# Patient Record
Sex: FEMALE | Race: ASIAN | NOT HISPANIC OR LATINO | Employment: FULL TIME | URBAN - METROPOLITAN AREA
[De-identification: names, ages, dates, MRNs, and addresses within clinical notes are randomized per-mention and may not be internally consistent; named-entity substitution may affect disease eponyms.]

---

## 2019-11-08 ENCOUNTER — OFFICE VISIT - HEALTHEAST (OUTPATIENT)
Dept: FAMILY MEDICINE | Facility: CLINIC | Age: 34
End: 2019-11-08

## 2019-11-08 DIAGNOSIS — M54.50 MIDLINE LOW BACK PAIN WITHOUT SCIATICA, UNSPECIFIED CHRONICITY: ICD-10-CM

## 2019-11-08 DIAGNOSIS — F17.200 NICOTINE DEPENDENCE, UNCOMPLICATED, UNSPECIFIED NICOTINE PRODUCT TYPE: ICD-10-CM

## 2019-11-08 DIAGNOSIS — K21.9 GASTROESOPHAGEAL REFLUX DISEASE WITHOUT ESOPHAGITIS: ICD-10-CM

## 2019-11-08 DIAGNOSIS — J98.01 BRONCHOSPASM: ICD-10-CM

## 2019-11-08 DIAGNOSIS — E66.813 CLASS 3 SEVERE OBESITY DUE TO EXCESS CALORIES WITHOUT SERIOUS COMORBIDITY IN ADULT, UNSPECIFIED BMI (H): ICD-10-CM

## 2019-11-08 DIAGNOSIS — B19.10 HEPATITIS B INFECTION WITHOUT DELTA AGENT WITHOUT HEPATIC COMA, UNSPECIFIED CHRONICITY: ICD-10-CM

## 2019-11-08 DIAGNOSIS — E66.01 CLASS 3 SEVERE OBESITY DUE TO EXCESS CALORIES WITHOUT SERIOUS COMORBIDITY IN ADULT, UNSPECIFIED BMI (H): ICD-10-CM

## 2019-11-08 DIAGNOSIS — T78.40XA ALLERGIC STATE, INITIAL ENCOUNTER: ICD-10-CM

## 2019-11-08 DIAGNOSIS — F43.10 PTSD (POST-TRAUMATIC STRESS DISORDER): ICD-10-CM

## 2019-11-08 ASSESSMENT — MIFFLIN-ST. JEOR: SCORE: 1864.56

## 2020-09-18 ENCOUNTER — COMMUNICATION - HEALTHEAST (OUTPATIENT)
Dept: FAMILY MEDICINE | Facility: CLINIC | Age: 35
End: 2020-09-18

## 2020-09-18 DIAGNOSIS — T78.40XA ALLERGIC STATE, INITIAL ENCOUNTER: ICD-10-CM

## 2020-10-30 ENCOUNTER — COMMUNICATION - HEALTHEAST (OUTPATIENT)
Dept: FAMILY MEDICINE | Facility: CLINIC | Age: 35
End: 2020-10-30

## 2020-10-30 DIAGNOSIS — T78.40XA ALLERGY, INITIAL ENCOUNTER: ICD-10-CM

## 2020-12-20 ENCOUNTER — COMMUNICATION - HEALTHEAST (OUTPATIENT)
Dept: FAMILY MEDICINE | Facility: CLINIC | Age: 35
End: 2020-12-20

## 2020-12-20 DIAGNOSIS — K21.9 GASTROESOPHAGEAL REFLUX DISEASE WITHOUT ESOPHAGITIS: ICD-10-CM

## 2020-12-20 DIAGNOSIS — M54.50 MIDLINE LOW BACK PAIN WITHOUT SCIATICA, UNSPECIFIED CHRONICITY: ICD-10-CM

## 2020-12-20 DIAGNOSIS — T78.40XA ALLERGY, INITIAL ENCOUNTER: ICD-10-CM

## 2021-02-28 ENCOUNTER — COMMUNICATION - HEALTHEAST (OUTPATIENT)
Dept: FAMILY MEDICINE | Facility: CLINIC | Age: 36
End: 2021-02-28

## 2021-02-28 DIAGNOSIS — T78.40XA ALLERGY, INITIAL ENCOUNTER: ICD-10-CM

## 2021-02-28 DIAGNOSIS — M54.50 MIDLINE LOW BACK PAIN WITHOUT SCIATICA, UNSPECIFIED CHRONICITY: ICD-10-CM

## 2021-03-09 ENCOUNTER — COMMUNICATION - HEALTHEAST (OUTPATIENT)
Dept: FAMILY MEDICINE | Facility: CLINIC | Age: 36
End: 2021-03-09

## 2021-03-18 ENCOUNTER — COMMUNICATION - HEALTHEAST (OUTPATIENT)
Dept: FAMILY MEDICINE | Facility: CLINIC | Age: 36
End: 2021-03-18

## 2021-03-27 ENCOUNTER — COMMUNICATION - HEALTHEAST (OUTPATIENT)
Dept: FAMILY MEDICINE | Facility: CLINIC | Age: 36
End: 2021-03-27

## 2021-03-27 DIAGNOSIS — K21.9 GASTROESOPHAGEAL REFLUX DISEASE WITHOUT ESOPHAGITIS: ICD-10-CM

## 2021-03-27 DIAGNOSIS — T78.40XA ALLERGY, INITIAL ENCOUNTER: ICD-10-CM

## 2021-04-30 ENCOUNTER — COMMUNICATION - HEALTHEAST (OUTPATIENT)
Dept: TELEHEALTH | Facility: CLINIC | Age: 36
End: 2021-04-30

## 2021-04-30 ENCOUNTER — OFFICE VISIT - HEALTHEAST (OUTPATIENT)
Dept: FAMILY MEDICINE | Facility: CLINIC | Age: 36
End: 2021-04-30

## 2021-04-30 DIAGNOSIS — F12.20 MARIJUANA DEPENDENCE (H): ICD-10-CM

## 2021-04-30 DIAGNOSIS — R23.3 EASY BRUISING: ICD-10-CM

## 2021-04-30 DIAGNOSIS — N92.4 EXCESSIVE BLEEDING IN PREMENOPAUSAL PERIOD: ICD-10-CM

## 2021-04-30 DIAGNOSIS — N91.4 SECONDARY OLIGOMENORRHEA: ICD-10-CM

## 2021-04-30 DIAGNOSIS — N18.1 CKD (CHRONIC KIDNEY DISEASE) STAGE 1, GFR 90 ML/MIN OR GREATER: ICD-10-CM

## 2021-04-30 DIAGNOSIS — E11.9 TYPE 2 DIABETES MELLITUS WITHOUT COMPLICATION, WITHOUT LONG-TERM CURRENT USE OF INSULIN (H): ICD-10-CM

## 2021-04-30 DIAGNOSIS — Z31.69 INFERTILITY COUNSELING: ICD-10-CM

## 2021-04-30 DIAGNOSIS — F40.01 PANIC DISORDER WITH AGORAPHOBIA: ICD-10-CM

## 2021-04-30 DIAGNOSIS — B18.1 CHRONIC VIRAL HEPATITIS B WITHOUT DELTA AGENT AND WITHOUT COMA (H): ICD-10-CM

## 2021-04-30 DIAGNOSIS — F43.10 PTSD (POST-TRAUMATIC STRESS DISORDER): ICD-10-CM

## 2021-04-30 DIAGNOSIS — B18.1 CHRONIC HEPATITIS B (H): ICD-10-CM

## 2021-04-30 DIAGNOSIS — E66.01 MORBIDLY OBESE (H): ICD-10-CM

## 2021-04-30 DIAGNOSIS — F33.2 SEVERE EPISODE OF RECURRENT MAJOR DEPRESSIVE DISORDER, WITHOUT PSYCHOTIC FEATURES (H): ICD-10-CM

## 2021-04-30 DIAGNOSIS — Z12.4 CERVICAL CANCER SCREENING: ICD-10-CM

## 2021-04-30 DIAGNOSIS — K21.9 GASTROESOPHAGEAL REFLUX DISEASE WITHOUT ESOPHAGITIS: ICD-10-CM

## 2021-04-30 DIAGNOSIS — N85.01 ENDOMETRIAL HYPERPLASIA, COMPLEX: ICD-10-CM

## 2021-04-30 DIAGNOSIS — T78.40XA ALLERGY, INITIAL ENCOUNTER: ICD-10-CM

## 2021-04-30 DIAGNOSIS — Z00.00 ROUTINE GENERAL MEDICAL EXAMINATION AT A HEALTH CARE FACILITY: ICD-10-CM

## 2021-04-30 DIAGNOSIS — D50.0 IRON DEFICIENCY ANEMIA DUE TO CHRONIC BLOOD LOSS: ICD-10-CM

## 2021-04-30 DIAGNOSIS — K21.9 GASTROESOPHAGEAL REFLUX DISEASE, UNSPECIFIED WHETHER ESOPHAGITIS PRESENT: ICD-10-CM

## 2021-04-30 LAB
AFP SERPL-MCNC: <2 UG/ML
ALBUMIN SERPL-MCNC: 3.8 G/DL (ref 3.5–5)
ALBUMIN SERPL-MCNC: 3.8 G/DL (ref 3.5–5)
ALP SERPL-CCNC: 82 U/L (ref 45–120)
ALP SERPL-CCNC: 82 U/L (ref 45–120)
ALT SERPL W P-5'-P-CCNC: 27 U/L (ref 0–45)
ALT SERPL W P-5'-P-CCNC: 27 U/L (ref 0–45)
ANION GAP SERPL CALCULATED.3IONS-SCNC: 12 MMOL/L (ref 5–18)
AST SERPL W P-5'-P-CCNC: 16 U/L (ref 0–40)
AST SERPL W P-5'-P-CCNC: 16 U/L (ref 0–40)
BILIRUB DIRECT SERPL-MCNC: 0.1 MG/DL
BILIRUB SERPL-MCNC: 0.5 MG/DL (ref 0–1)
BILIRUB SERPL-MCNC: 0.5 MG/DL (ref 0–1)
BUN SERPL-MCNC: 17 MG/DL (ref 8–22)
CALCIUM SERPL-MCNC: 9.1 MG/DL (ref 8.5–10.5)
CHLORIDE BLD-SCNC: 101 MMOL/L (ref 98–107)
CHOLEST SERPL-MCNC: 191 MG/DL
CO2 SERPL-SCNC: 24 MMOL/L (ref 22–31)
CREAT SERPL-MCNC: 0.88 MG/DL (ref 0.6–1.1)
ERYTHROCYTE [DISTWIDTH] IN BLOOD BY AUTOMATED COUNT: 16 % (ref 11–14.5)
FASTING STATUS PATIENT QL REPORTED: NO
GFR SERPL CREATININE-BSD FRML MDRD: >60 ML/MIN/1.73M2
GLUCOSE BLD-MCNC: 302 MG/DL (ref 70–125)
HBA1C MFR BLD: 8.2 %
HCT VFR BLD AUTO: 34.6 % (ref 35–47)
HDLC SERPL-MCNC: 41 MG/DL
HGB BLD-MCNC: 10.4 G/DL (ref 12–16)
HIV 1+2 AB+HIV1 P24 AG SERPL QL IA: NEGATIVE
IRON SATN MFR SERPL: 4 % (ref 20–50)
IRON SERPL-MCNC: 17 UG/DL (ref 42–175)
LDLC SERPL CALC-MCNC: 106 MG/DL
MCH RBC QN AUTO: 21 PG (ref 27–34)
MCHC RBC AUTO-ENTMCNC: 30.1 G/DL (ref 32–36)
MCV RBC AUTO: 70 FL (ref 80–100)
PLATELET # BLD AUTO: 317 THOU/UL (ref 140–440)
PMV BLD AUTO: 10.3 FL (ref 7–10)
POTASSIUM BLD-SCNC: 3.8 MMOL/L (ref 3.5–5)
PROT SERPL-MCNC: 7.4 G/DL (ref 6–8)
PROT SERPL-MCNC: 7.4 G/DL (ref 6–8)
RBC # BLD AUTO: 4.95 MILL/UL (ref 3.8–5.4)
SODIUM SERPL-SCNC: 137 MMOL/L (ref 136–145)
TIBC SERPL-MCNC: 389 UG/DL (ref 313–563)
TRANSFERRIN SERPL-MCNC: 311 MG/DL (ref 212–360)
TRIGL SERPL-MCNC: 220 MG/DL
TSH SERPL DL<=0.005 MIU/L-ACNC: 1.17 UIU/ML (ref 0.3–5)
WBC: 11 THOU/UL (ref 4–11)

## 2021-04-30 ASSESSMENT — PATIENT HEALTH QUESTIONNAIRE - PHQ9: SUM OF ALL RESPONSES TO PHQ QUESTIONS 1-9: 9

## 2021-04-30 ASSESSMENT — MIFFLIN-ST. JEOR: SCORE: 1874.77

## 2021-05-01 LAB — HBV E AG SERPL QL IA: NEGATIVE

## 2021-05-03 LAB
25(OH)D3 SERPL-MCNC: 15.5 NG/ML (ref 30–80)
HCV AB SERPL QL IA: NEGATIVE

## 2021-05-04 ENCOUNTER — COMMUNICATION - HEALTHEAST (OUTPATIENT)
Dept: FAMILY MEDICINE | Facility: CLINIC | Age: 36
End: 2021-05-04

## 2021-05-04 ENCOUNTER — AMBULATORY - HEALTHEAST (OUTPATIENT)
Dept: FAMILY MEDICINE | Facility: CLINIC | Age: 36
End: 2021-05-04

## 2021-05-04 DIAGNOSIS — E55.9 VITAMIN D DEFICIENCY: ICD-10-CM

## 2021-05-04 LAB
HBV DNA SERPL NAA+PROBE-ACNC: NORMAL [IU]/ML
HBV DNA SERPL NAA+PROBE-LOG IU: NORMAL {LOG_IU}/ML

## 2021-05-05 ENCOUNTER — RECORDS - HEALTHEAST (OUTPATIENT)
Dept: FAMILY MEDICINE | Facility: CLINIC | Age: 36
End: 2021-05-05

## 2021-05-05 ENCOUNTER — COMMUNICATION - HEALTHEAST (OUTPATIENT)
Dept: FAMILY MEDICINE | Facility: CLINIC | Age: 36
End: 2021-05-05

## 2021-05-06 ENCOUNTER — COMMUNICATION - HEALTHEAST (OUTPATIENT)
Dept: FAMILY MEDICINE | Facility: CLINIC | Age: 36
End: 2021-05-06

## 2021-05-06 DIAGNOSIS — E11.9 TYPE 2 DIABETES MELLITUS WITHOUT COMPLICATION, WITHOUT LONG-TERM CURRENT USE OF INSULIN (H): ICD-10-CM

## 2021-05-10 ENCOUNTER — COMMUNICATION - HEALTHEAST (OUTPATIENT)
Dept: FAMILY MEDICINE | Facility: CLINIC | Age: 36
End: 2021-05-10

## 2021-05-20 ENCOUNTER — COMMUNICATION - HEALTHEAST (OUTPATIENT)
Dept: FAMILY MEDICINE | Facility: CLINIC | Age: 36
End: 2021-05-20

## 2021-05-20 ENCOUNTER — RECORDS - HEALTHEAST (OUTPATIENT)
Dept: FAMILY MEDICINE | Facility: CLINIC | Age: 36
End: 2021-05-20

## 2021-05-26 ENCOUNTER — COMMUNICATION - HEALTHEAST (OUTPATIENT)
Dept: FAMILY MEDICINE | Facility: CLINIC | Age: 36
End: 2021-05-26

## 2021-05-26 DIAGNOSIS — T78.40XA ALLERGY, INITIAL ENCOUNTER: ICD-10-CM

## 2021-05-27 ENCOUNTER — COMMUNICATION - HEALTHEAST (OUTPATIENT)
Dept: FAMILY MEDICINE | Facility: CLINIC | Age: 36
End: 2021-05-27

## 2021-05-27 ENCOUNTER — OFFICE VISIT - HEALTHEAST (OUTPATIENT)
Dept: FAMILY MEDICINE | Facility: CLINIC | Age: 36
End: 2021-05-27

## 2021-05-27 DIAGNOSIS — E11.9 TYPE 2 DIABETES MELLITUS WITHOUT COMPLICATION, WITHOUT LONG-TERM CURRENT USE OF INSULIN (H): ICD-10-CM

## 2021-05-27 DIAGNOSIS — E11.65 TYPE 2 DIABETES MELLITUS WITH HYPERGLYCEMIA, WITHOUT LONG-TERM CURRENT USE OF INSULIN (H): ICD-10-CM

## 2021-05-27 DIAGNOSIS — K21.9 GASTROESOPHAGEAL REFLUX DISEASE, UNSPECIFIED WHETHER ESOPHAGITIS PRESENT: ICD-10-CM

## 2021-05-27 DIAGNOSIS — T78.40XA ALLERGY, INITIAL ENCOUNTER: ICD-10-CM

## 2021-05-27 DIAGNOSIS — K59.00 CONSTIPATION, UNSPECIFIED CONSTIPATION TYPE: ICD-10-CM

## 2021-05-27 ASSESSMENT — PATIENT HEALTH QUESTIONNAIRE - PHQ9: SUM OF ALL RESPONSES TO PHQ QUESTIONS 1-9: 9

## 2021-06-03 VITALS
HEART RATE: 80 BPM | HEIGHT: 64 IN | DIASTOLIC BLOOD PRESSURE: 88 MMHG | SYSTOLIC BLOOD PRESSURE: 124 MMHG | RESPIRATION RATE: 20 BRPM | TEMPERATURE: 97.8 F | WEIGHT: 264 LBS | BODY MASS INDEX: 45.07 KG/M2

## 2021-06-03 NOTE — PROGRESS NOTES
New pt  Pt was working as [employer]  at this clinic and with facial swelling made appt to be seen  Does not have a pcp    Swelling itch left face 2 hours.  Had felt itch,  Scratched,  then swell.   Left face and left arm     Facial neck up to parietal area.    No shortness of breath  Do feel shaky    Has been onTrazodone and fluoxetine years  Dr Tana slaughter?   [facility] [place]  ptsd anx dep  Drug allergies : zoloft causes arm pain    Unusual recent events: had teeth Cleaning This morning.   Second session this week.    No faintness    Smoking for 20 years.   3 cig per day    Denies hemoptysis  No etoh    Eye allergic   Nasal   In April this year treated with antihistamines    No known past wheeze though has never been examined    hosp surg neg  Fmh: dad with hep B    She thinks she has hep b...       Dad  liver failure.  From B  Was 65.    Has no pcp    Never follow up hep b    [facility] Works as  Never been preg.  Single    ROS:  Constitutional: denies fever  Vision: denies change in vision  ENT: denies cough or congestion  Thyroid: denies unusual fatigue  Resp: denies shortness of breath  Card: denies palpitations  GI: denies vomiting or abnormal stool, melena, hematochezia      complains of frequent heartburn and otc ppi use  : denies dysuria  Neuro: denies numbness or weakness  Derm: above  Joints: denies redness or swelling    Back intermittent pain.  occ intermittent radiation to one side or other not below hips  Endo: denies polyuria  Mental health: mood is good  Extremities: no edema  Mobility: stable    Otherwise negative review of systems    Chronic hepatitis denies abdominal pain or icterus   Obesity denies polyuria  Nicotine dependence.  Denies hemoptysis.   Anxiety disorder, denies recent disabling, functionally impairing, problems.          OBJECTIVE:   Vitals:    19 1702   BP: 124/88   Pulse: 80   Resp: 20   Temp: 97.8  F (36.6  C)      Wt is noted.  No  diaphoresis  Eyes: nl eom, anicteric   External ears, nose: nl      Pt initially seen over two hours after onset of sxs.  Mild left facial swelling and minimal erythema.  No warmth.   Left neck 2.5x1.5 cm urticaria     Neck: nl nodes, supple, thyroid normal   Lungs: decreased air exchange.  Terminal minimal wheeze.   Heart: regular rhythm  Abd: soft nontender     No cva (renal) tenderness  Neuro: no weakness  Skin no rash  Joints: uninflamed   No ketotic breath odor noted  Mental: euthymic  Ext: nontender calves   Gait: normal    Body mass index is 46.03 kg/m .     ASSESSMENT/PLAN:    End of visit over 3 hours after initial sxs.  Stable.  / ED if worse    Allergic reaction. ? Etiology.  Did get teeth cleaning earlier today    Discussed need for addressing chronic issues: hepatitis B,  Obesity, nicotine, bronchospasm    R/o pylori prior to ppi prn    Chronic issues stable/ same treatment.     follow up here one month or establish pcp elsewhere.   Sooner if sxs    1. Allergic state, initial encounter  cetirizine (ZYRTEC) 10 MG tablet    diphenhydrAMINE (BENADRYL) 25 mg capsule   2. Hepatitis B infection without delta agent without hepatic coma, unspecified chronicity     3. Class 3 severe obesity due to excess calories without serious comorbidity in adult, unspecified BMI (H)     4. Nicotine dependence, uncomplicated, unspecified nicotine product type     5. PTSD (post-traumatic stress disorder)     6. Bronchospasm  albuterol (PROAIR HFA;PROVENTIL HFA;VENTOLIN HFA) 90 mcg/actuation inhaler   7. Gastroesophageal reflux disease without esophagitis  omeprazole (PRILOSEC) 20 MG capsule    H. pylori Antigen, Stool(HPSAG)   8. Midline low back pain without sciatica, unspecified chronicity  acetaminophen 500 mg coapsule

## 2021-06-05 VITALS
SYSTOLIC BLOOD PRESSURE: 132 MMHG | RESPIRATION RATE: 18 BRPM | WEIGHT: 268 LBS | DIASTOLIC BLOOD PRESSURE: 84 MMHG | HEIGHT: 63 IN | TEMPERATURE: 98.1 F | BODY MASS INDEX: 47.48 KG/M2 | HEART RATE: 105 BPM

## 2021-06-11 ENCOUNTER — COMMUNICATION - HEALTHEAST (OUTPATIENT)
Dept: FAMILY MEDICINE | Facility: CLINIC | Age: 36
End: 2021-06-11

## 2021-06-11 DIAGNOSIS — B18.1 CHRONIC HEPATITIS B (H): ICD-10-CM

## 2021-06-15 NOTE — TELEPHONE ENCOUNTER
Please let the patient know that Dr. Staley has retired.    She was given 30-day supply of medications, but needs to schedule with a new provider.  Please schedule an appointment virtual or face-to-face.

## 2021-06-15 NOTE — TELEPHONE ENCOUNTER
RN cannot approve Refill Request    RN can NOT refill this medication No established PCP. Last office visit: Visit date not found Last Physical: Visit date not found Last MTM visit: Visit date not found Last visit same specialty: 11/8/2019 Kevin Staley MD.  Next visit within 3 mo: Visit date not found  Next physical within 3 mo: Visit date not found      Lisa Torres, [facility] Triage/Med Refill 2/28/2021    Requested Prescriptions   Pending Prescriptions Disp Refills     cetirizine (ZYRTEC) 10 MG tablet [Pharmacy Med Name: Cetirizine HCl Oral Tablet 10 MG] 30 tablet 0     Sig: Take 1 tablet (10 mg total) by mouth daily.       Antihistamine Refill Protocol Failed - 2/28/2021  3:45 PM        Failed - Patient has had office visit/physical in last year     Last office visit with prescriber/PCP: Visit date not found OR same dept: Visit date not found OR same specialty: 11/8/2019 Kevin Staley MD  Last physical: Visit date not found Last MTM visit: Visit date not found   Next visit within 3 mo: Visit date not found  Next physical within 3 mo: Visit date not found  Prescriber OR PCP: Alanis Richard MD  Last diagnosis associated with med order: 1. Allergy, initial encounter  - cetirizine (ZYRTEC) 10 MG tablet [Pharmacy Med Name: Cetirizine HCl Oral Tablet 10 MG]; Take 1 tablet (10 mg total) by mouth daily.  Dispense: 30 tablet; Refill: 0    2. Midline low back pain without sciatica, unspecified chronicity  - acetaminophen (TYLENOL) 500 MG tablet [Pharmacy Med Name: Acetaminophen Oral Tablet 500 MG]; TAKE TWO TABLETS BY MOUTH TWO TIMES DAILY AS NEEDED for pain   Dispense: 200 tablet; Refill: 0    If protocol passes may refill for 12 months if within 3 months of last provider visit (or a total of 15 months).                acetaminophen (TYLENOL) 500 MG tablet [Pharmacy Med Name: Acetaminophen Oral Tablet 500 MG] 200 tablet 0     Sig: TAKE TWO TABLETS BY MOUTH TWO TIMES DAILY AS NEEDED for pain        There is no refill protocol information for this order

## 2021-06-15 NOTE — TELEPHONE ENCOUNTER
Left message #2 at [phone]. Sending letter out and postponing task out to 2 weeks and will try again if an appointment hasn't been made. If patient returns call back, please help patient schedule an appointment per message below. Thanks!

## 2021-06-15 NOTE — TELEPHONE ENCOUNTER
Left message #1 at [phone]. Postponing task out to a week and will try again. If patient returns call back, please help patient schedule an appointment per message below. Thanks!

## 2021-06-16 NOTE — TELEPHONE ENCOUNTER
RN cannot approve Refill Request    RN can NOT refill this medication PCP messaged that patient is overdue for Office Visit. Last office visit: Visit date not found Last Physical: Visit date not found Last MTM visit: Visit date not found Last visit same specialty: 11/8/2019 Kevin Staley MD.  Next visit within 3 mo: Visit date not found  Next physical within 3 mo: Visit date not found      Lisa Torres, [facility] Triage/Med Refill 3/27/2021    Requested Prescriptions   Pending Prescriptions Disp Refills     omeprazole (PRILOSEC) 20 MG capsule [Pharmacy Med Name: Omeprazole Oral Capsule Delayed Release 20 MG] 30 capsule 0     Sig: Take 1 capsule (20 mg total) by mouth daily before breakfast.       GI Medications Refill Protocol Failed - 3/27/2021  2:10 PM        Failed - PCP or prescribing provider visit in last 12 or next 3 months.     Last office visit with prescriber/PCP: Visit date not found OR same dept: Visit date not found OR same specialty: 11/8/2019 Kevin Staley MD  Last physical: Visit date not found Last MTM visit: Visit date not found   Next visit within 3 mo: Visit date not found  Next physical within 3 mo: Visit date not found  Prescriber OR PCP: Alanis Richard MD  Last diagnosis associated with med order: 1. Gastroesophageal reflux disease without esophagitis  - omeprazole (PRILOSEC) 20 MG capsule [Pharmacy Med Name: Omeprazole Oral Capsule Delayed Release 20 MG]; Take 1 capsule (20 mg total) by mouth daily before breakfast.  Dispense: 30 capsule; Refill: 0    If protocol passes may refill for 12 months if within 3 months of last provider visit (or a total of 15 months).

## 2021-06-16 NOTE — TELEPHONE ENCOUNTER
Future Appointments   Date Time Provider Department Center   4/30/2021  9:00 AM Abril Martin MD [facility]     Health Maintenance Due   Topic Date Due     DEPRESSION ACTION PLAN  Never done     HEPATITIS C SCREENING  Never done     PREVENTIVE CARE VISIT  Never done     Pneumococcal Vaccine: Pediatrics (0 to 5 Years) and At-Risk Patients (6 to 64 Years) (1 of 2 - PPSV23) Never done     HIV SCREENING  Never done     ADVANCE CARE PLANNING  Never done     PAP SMEAR  Never done     TD 18+ HE  09/17/2019     INFLUENZA VACCINE RULE BASED (1) 08/01/2020     BP Readings from Last 3 Encounters:   11/08/19 124/88

## 2021-06-16 NOTE — TELEPHONE ENCOUNTER
Who is calling:  Monica  Reason for Call:  Was wondering if Dr. Martin would take her on as a patient - she is wanting to start a family and feels comfortable with Dr. Martin  Date of last appointment with primary care: NA  Okay to leave a detailed message: Yes

## 2021-06-16 NOTE — TELEPHONE ENCOUNTER
RN cannot approve Refill Request    RN can NOT refill this medication Protocol failed and NO refill given. Last office visit: Visit date not found Last Physical: Visit date not found Last MTM visit: Visit date not found Last visit same specialty: 11/8/2019 Kevin Staley MD.  Next visit within 3 mo: Visit date not found  Next physical within 3 mo: Visit date not found      Lisa Torres, [facility] Triage/Med Refill 3/27/2021    Requested Prescriptions   Pending Prescriptions Disp Refills     BANOPHEN 25 mg capsule [Pharmacy Med Name: Banophen Oral Capsule 25 MG] 200 capsule 0     Sig: TAKE ONE CAPSULE BY MOUTH EVERY SIX HOURS AS NEEDED FOR ITCHING       Antihistamine Refill Protocol Failed - 3/27/2021  2:08 PM        Failed - Patient has had office visit/physical in last year     Last office visit with prescriber/PCP: Visit date not found OR same dept: Visit date not found OR same specialty: 11/8/2019 Kevin Staley MD  Last physical: Visit date not found Last MTM visit: Visit date not found   Next visit within 3 mo: Visit date not found  Next physical within 3 mo: Visit date not found  Prescriber OR PCP: Rosas Hammer MD  Last diagnosis associated with med order: 1. Allergy, initial encounter  - BANOPHEN 25 mg capsule [Pharmacy Med Name: Banophen Oral Capsule 25 MG]; TAKE ONE CAPSULE BY MOUTH EVERY SIX HOURS AS NEEDED FOR ITCHING  Dispense: 200 capsule; Refill: 0    If protocol passes may refill for 12 months if within 3 months of last provider visit (or a total of 15 months).

## 2021-06-17 NOTE — TELEPHONE ENCOUNTER
Maricel Ling at 5/5/2021  2:48 PM    Status: Signed      Unable to LM at [phone] due to Unable to recive calls at this time . Sending letter out and postponing task out to 2 weeks and will try again if an appointment hasn't been made.          Blanca Shay at 5/5/2021  3:06 PM    Status: Signed      Unable to LM at [phone] due to voice mail not set up . Sending letter out and postponing task out to 2 weeks and will try again if an appointment hasn't been made.

## 2021-06-17 NOTE — TELEPHONE ENCOUNTER
RN cannot approve Refill Request    RN can NOT refill this medication med is not covered by policy/route to provider. Last office visit: Visit date not found Last Physical: Visit date not found Last MTM visit: Visit date not found Last visit same specialty: 11/8/2019 Kevin Staley MD.  Next visit within 3 mo: Visit date not found  Next physical within 3 mo: Visit date not found      Adelia Pride, [facility] Triage/Med Refill 5/20/2021    Requested Prescriptions   Pending Prescriptions Disp Refills     acetaminophen (TYLENOL) 500 MG tablet [Pharmacy Med Name: Acetaminophen Oral Tablet 500 MG] 30 tablet 0     Sig: TAKE TWO TABLETS BY MOUTH TWO TIMES DAILY AS NEEDED FOR PAIN       There is no refill protocol information for this order

## 2021-06-17 NOTE — TELEPHONE ENCOUNTER
Patient called back, message was relayed. Please put in referral for Diabetes Education. No further question. Completing task.

## 2021-06-17 NOTE — TELEPHONE ENCOUNTER
Left message x 1. Please review message thread below and advise the patient as indicated. Please schedule if necessary or indicated in message thread.       ----- Message from Abril Martin MD sent at 5/4/2021  4:23 PM CDT -----  Please let Monica know:    Diabetes  - your A1c is 8.2%. It should be less than 7%.  - see diabetes educator to learn how to check your sugars, and learn to eat & exercise to take good care of your diabetes.    Vitamin D deficiency:  -start vitamin D (we will send in a prescription).    Iron Deficiency Anemia  - your red blood cells are low  -start ferrous gluconate (iron) every other day for a minimum of six months    Hepatitis B:  - no detectable virus  - no sign of liver cancer or injury  - you don't have hepatitis C or HIV    Cholesterol  - LDL is good, triglycerides are normal (for not fasting)  - your HDL is a little low. Regular exercise will help with this.    NORMAL thyroid & Kidney tests

## 2021-06-17 NOTE — PROGRESS NOTES
Unable to LM at [phone] due to Unable to recive calls at this time . Sending letter out and postponing task out to 2 weeks and will try again if an appointment hasn't been made.

## 2021-06-17 NOTE — TELEPHONE ENCOUNTER
----- Message from Abril Martin MD sent at 5/5/2021  2:13 PM CDT -----  Please schedule follow up in one month: new diabetes, abnormal labs.

## 2021-06-17 NOTE — TELEPHONE ENCOUNTER
5-5-21  Reason for Call:hep b postive    Detailed comments: jc called from [facility]pt of health stated on 4-30-21 pt tested positive for hep b and jc wants to know if pt is pregnant     Phone Number Patient can be reached at: Other phone number: [phone]    Best Time: anytime    Can we leave a detailed message on this number?: Yes    Call taken on 5/5/2021 at 2:25 PM by Clare Farley

## 2021-06-17 NOTE — PATIENT INSTRUCTIONS - HE
Possible Allergy  See allergist - bring in MSDS (materials safety data sheet) when you go.    Infertility  See fertility specialist

## 2021-06-17 NOTE — PROGRESS NOTES
Health Maintenance Exam  [facility]  Date of Service: 04/30/2021    Agnes Doshi is a 35 y.o. female who presents for a routine physical exam.     Current concerns include the following:    Skin tags   -neck, arm pits, bra line  -catching on clothing and jewelry    Fertility   -10 years of trying without pregnancy  -oligomenorrhea    Allergy?  -sensitive to smell of chemical at work - gets nauseated when others don't.   -Chemical is named: Parylene    Abnormal BM   - diarrhea, constpiation  - chronic    Active Non-Hospital Problems    Diagnosis     Diabetes mellitus, type 2 (H)     Dx: 2016       Endometrial hyperplasia, complex     BMI 45.0-49.9, adult (H)     Major depressive disorder, recurrent episode, severe (H)     Infertility counseling     Trying from 7782-7879 without pregnancy. Oligomenorrhea.       Secondary oligomenorrhea     PCOS?       Iron deficiency anemia due to chronic blood loss     Menorrhagia     5/16: Endometrial echo 20 mm, thickened.  Pt is bleeding, the measured thickness   appears to have vascular flow within, may wish to consider hysteroscopy   and/or D&C for optimal evaluation.    5/14: Endometrium, biopsy --        1.  Focal complex hyperplasia without atypia and rare squamous   morules (see comment)       2.  No malignancy seen   Comments   Some studies report an associated increased risk of concurrent or   future development of endometrial carcinoma with squamous morular   metaplasia.  Recommend followup 3-6 interval endometrial curettage and   close long term surveillance.         Panic disorder with agoraphobia     PTSD (post-traumatic stress disorder)     GERD (gastroesophageal reflux disease)     Chronic hepatitis B (H)     2008. HepBsAg positive. Hep A immunization needed____, Hep C & HIV neg.       Marijuana dependence (H)     Easy bruising     CKD (chronic kidney disease) stage 1, GFR 90 ml/min or greater     H/o low creatinine 2016        Past Medical History:   Diagnosis Date     Chlamydia 2013      Past Surgical History:   Procedure Laterality Date     NO PAST SURGERIES        Current Outpatient Medications   Medication Sig Dispense Refill     acetaminophen (TYLENOL) 500 MG tablet TAKE TWO TABLETS BY MOUTH TWO TIMES DAILY AS NEEDED for pain 30 tablet 0     albuterol (PROAIR HFA;PROVENTIL HFA;VENTOLIN HFA) 90 mcg/actuation inhaler Inhale 2 puffs every 6 (six) hours as needed for wheezing. 1 each 11     cetirizine (ZYRTEC) 10 MG tablet Take 1 tablet (10 mg total) by mouth daily. 30 tablet 0     FLUoxetine (PROZAC) 40 MG capsule Take 1 capsule (40 mg total) by mouth daily. 90 capsule 4     omeprazole (PRILOSEC) 20 MG capsule Take 1 capsule (20 mg total) by mouth daily before breakfast. 90 capsule 3     sucralfate (CARAFATE) 1 gram tablet Take 1 g by mouth 4 (four) times a day.       traZODone (DESYREL) 50 MG tablet Take 1 tablet (50 mg total) by mouth at bedtime as needed. 90 tablet 4     alcohol swabs PadM Apply 1 each topically daily. 100 each 3     BANOPHEN 25 mg capsule TAKE ONE CAPSULE BY MOUTH EVERY SIX HOURS AS NEEDED FOR ITCHING 200 capsule 0     blood glucose test strips Use 1 each As Directed daily. Dispense brand per patient's insurance at pharmacy discretion. 100 strip 3     ergocalciferol (ERGOCALCIFEROL) 1,250 mcg (50,000 unit) capsule Take 1 capsule (50,000 Units total) by mouth once a week for 12 doses. (until late July). Then decrease to once a month. 12 capsule 0     ferrous gluconate (FERGON) 324 MG tablet Take 1 tablet (324 mg total) by mouth every other day. 100 tablet 1     ketotifen (ZADITOR/ZYRTEC ITCHY EYES) 0.025 % (0.035 %) ophthalmic solution Administer 1 drop to both eyes 2 (two) times a day as needed. 10 mL 11     lancets 32 gauge Misc Use 1 each As Directed daily. 100 each 3     prenatal vit no.130-iron-folic (PRENATAL VITAMIN) 27 mg iron- 800 mcg Tab tablet Take 1 tablet by mouth once daily. 100 tablet 3     No  current facility-administered medications for this visit.       Allergies   Allergen Reactions     Sertraline      Sharp pains in arms      Social History     Socioeconomic History     Marital status: Life Partner     Spouse name: Chaz Cheung     Number of children: 0     Years of education: Not on file     Highest education level: Not on file   Occupational History     Occupation:    Social Needs     Financial resource strain: Not on file     Food insecurity     Worry: Not on file     Inability: Not on file     Transportation needs     Medical: Not on file     Non-medical: Not on file   Tobacco Use     Smoking status: Current Every Day Smoker     Packs/day: 0.25     Types: Cigarettes     Smokeless tobacco: Never Used   Substance and Sexual Activity     Alcohol use: Not on file     Drug use: Yes     Types: Marijuana     Comment: h/o positive amphetamines on screen     Sexual activity: Yes     Partners: Male     Birth control/protection: None     Comment: Planning pregnancy (experiencing infertility)   Lifestyle     Physical activity     Days per week: 0 days     Minutes per session: 0 min     Stress: Not on file   Relationships     Social connections     Talks on phone: Not on file     Gets together: Not on file     Attends Worship service: Not on file     Active member of club or organization: Not on file     Attends meetings of clubs or organizations: Not on file     Relationship status: Not on file     Intimate partner violence     Fear of current or ex partner: Not on file     Emotionally abused: Not on file     Physically abused: Not on file     Forced sexual activity: Not on file   Other Topics Concern     Not on file   Social History Narrative     Not on file      Family History   Problem Relation Age of Onset     Hepatitis Father          of liver failure due to hep B 65y      REVIEW OF SYSTEMS: negative, except as listed in subjective above.    Physical Exam   /84 (Patient Site: Right  "Arm, Patient Position: Sitting, Cuff Size: Thigh)   Pulse (!) 105   Temp 98.1  F (36.7  C) (Temporal)   Resp 18   Ht 5' 3\" (1.6 m)   Wt (!) 268 lb (121.6 kg)   LMP 04/11/2021   BMI 47.47 kg/m     Social History     Tobacco Use   Smoking Status Current Every Day Smoker     Packs/day: 0.25     Types: Cigarettes   Smokeless Tobacco Never Used     General: Alert, NAD.  Head: NC/AT.  Ears: Normal canal, pinnae and tympanic membrane.  Eyes: PERRL, normal fundi.  Nose: Normal mucosa.  Mouth: Adequate dentition, normal throat.  Neck: normal thyroid, midline trachea.  Breasts: no masses, skin changes, nipple discharge or tenderness.  Lungs: CTA bilaterally, no increased work of breathing.  Heart: RRR, no m/r/g  Abdomen: soft, nt/nd, BS+  Genitourinary: Normal vulva, normal vaginal mucosa, cervix normal appearance. No cervical or adnexal tenderness. No adnexal fullness.  Musculoskeletal: No significant deformity.  Skin: no atypical lesion or rash.  Lymphatics: no lymphadenopathy.   Psych: normal affect.    Physical on 04/30/2021   Component Date Value Ref Range Status     AFP-Tumor Marker 04/30/2021 <2.0  <=8.5 ug/mL Final     Hep B Virus DNA Quant IU/mL 04/30/2021 HBV DNA Not Detected  HBVND [IU]/mL Final    The AJIT AmpliPrep/AJIT TaqMan HBV Test is an FDA-approved in vitro nucleic  acid amplification test for the quantitation of HBV DNA in human plasma (EDTA  plasma) or serum using the AJIT AmpliPrep Instrument for automated viral  nucleic acid extraction and the AJIT TaqMan Analyzer or AJIT TaqMan for the  automated Real Time PCR amplification and detection of viral nucleic acid  target.  Titer results are reported in International Units/mL (IU/mL) using the 1st WHO  International standard for HBV for Nucleic Acid Amplification based assays.     Hep B Virus DNA Quant Log IU/mL 04/30/2021 Not Calculated  <1.3 [Log_IU]/mL Final      Performed and/or entered by:  [facility]  [address] [place] [address]     Bilirubin, Total 04/30/2021 0.5  0.0 - 1.0 mg/dL Final     Bilirubin, Direct 04/30/2021 0.1  <=0.5 mg/dL Final     Protein, Total 04/30/2021 7.4  6.0 - 8.0 g/dL Final     Albumin 04/30/2021 3.8  3.5 - 5.0 g/dL Final     Alkaline Phosphatase 04/30/2021 82  45 - 120 U/L Final     AST 04/30/2021 16  0 - 40 U/L Final     ALT 04/30/2021 27  0 - 45 U/L Final     Hepatitis Be Antigen 04/30/2021 Negative  Negative Final    Performed by [facility],  [address] [place] [id number] [phone]  [email or web address], Francy Ghosh MD, Lab. Director     Hepatitis C Ab 04/30/2021 Negative  Negative Final     HIV Antigen / Antibody 04/30/2021 Negative  Negative Final     WBC 04/30/2021 11.0  4.0 - 11.0 thou/uL Final     RBC 04/30/2021 4.95  3.80 - 5.40 mill/uL Final     Hemoglobin 04/30/2021 10.4* 12.0 - 16.0 g/dL Final     Hematocrit 04/30/2021 34.6* 35.0 - 47.0 % Final     MCV 04/30/2021 70* 80 - 100 fL Final     MCH 04/30/2021 21.0* 27.0 - 34.0 pg Final     MCHC 04/30/2021 30.1* 32.0 - 36.0 g/dL Final     RDW 04/30/2021 16.0* 11.0 - 14.5 % Final     Platelets 04/30/2021 317  140 - 440 thou/uL Final     MPV 04/30/2021 10.3* 7.0 - 10.0 fL Final     Sodium 04/30/2021 137  136 - 145 mmol/L Final     Potassium 04/30/2021 3.8  3.5 - 5.0 mmol/L Final     Chloride 04/30/2021 101  98 - 107 mmol/L Final     CO2 04/30/2021 24  22 - 31 mmol/L Final     Anion Gap, Calculation 04/30/2021 12  5 - 18 mmol/L Final     Glucose 04/30/2021 302* 70 - 125 mg/dL Final     BUN 04/30/2021 17  8 - 22 mg/dL Final     Creatinine 04/30/2021 0.88  0.60 - 1.10 mg/dL Final     GFR MDRD Af Amer 04/30/2021 >60  >60 mL/min/1.73m2 Final     GFR MDRD Non Af Amer 04/30/2021 >60  >60 mL/min/1.73m2 Final     Bilirubin, Total 04/30/2021 0.5  0.0 - 1.0 mg/dL Final     Calcium 04/30/2021 9.1  8.5 - 10.5 mg/dL Final     Protein, Total 04/30/2021 7.4  6.0 - 8.0 g/dL Final     Albumin 04/30/2021 3.8  3.5 - 5.0 g/dL Final      Alkaline Phosphatase 04/30/2021 82  45 - 120 U/L Final     AST 04/30/2021 16  0 - 40 U/L Final     ALT 04/30/2021 27  0 - 45 U/L Final     Cholesterol 04/30/2021 191  <=199 mg/dL Final     Triglycerides 04/30/2021 220* <=149 mg/dL Final     HDL Cholesterol 04/30/2021 41* >=50 mg/dL Final     LDL Calculated 04/30/2021 106  <=129 mg/dL Final     Patient Fasting > 8hrs? 04/30/2021 No   Final     TSH 04/30/2021 1.17  0.30 - 5.00 uIU/mL Final     Vitamin D, Total (25-Hydroxy) 04/30/2021 15.5* 30.0 - 80.0 ng/mL Final     Hemoglobin A1c 04/30/2021 8.2* <=5.6 % Final     Iron 04/30/2021 17* 42 - 175 ug/dL Final     Transferrin 04/30/2021 311  212 - 360 mg/dL Final     Transferrin Saturation, Calculated 04/30/2021 4* 20 - 50 % Final     Transferrin IBC, Calculated 04/30/2021 389  313 - 563 ug/dL Final         Assessment & Plan   1. Health Maintenance  o Cancer screening: pap smear due - patient will do next time.  o Bone Health: Discussed Calcium, vitamin D, and weight bearing exercise.  o Immunizations: Reviewed and due for pneumovax (due to DM2), hepatitis A (due to chronic hep B), Covid-19 immunization. and Td.  2. Reproductive plans: Planning pregnancy. Encouraged establishing care with fertility specialist. She will consider it.  3. Endometrial hyperplasia. History of abnormal US 2016 and abnormal endometrial biopsy 2014 (no malignancy, but increased risk). Now with oligomenorrhea, menorrhagia, iron deficiency anemia. Care everywhere reviewed. She needs follow up endometrial biopsy.  4. Advance directive: not on file I have had an Advance Directives discussion with the patient..  5. Type 2 diabetes - new diagnosis. A1c 8.2%, chart review shows blood glucose 203 in 2016. Referral made to diabetes educator. Testing supplies sent.   6. Allergy concern - referral made to allergist. Patient will bring MSDS with her to the visit for the chemical of concern.  7. Skin tags. Likely due to insulin resistance. She will trim those  that she is able and will schedule a removal for any that she isn't able to remove at home  8. BMI 47. Discussed weight loss options.   9. Chronic hepatitis B. Labs and US ordered.  10. Follow up in one month.    Order Summary                                                      1. Routine general medical examination at a health care facility     2. Endometrial hyperplasia, complex     3. Gastroesophageal reflux disease, unspecified whether esophagitis present     4. Chronic viral hepatitis B without delta agent and without coma (H)  AFP-Tumor Marker    Hepatitis B DNA Quantitative Real-Time PCR(HBQNT)    Hepatic Profile    Hepatitis Be Antigen    US Abdomen Limited    Hepatitis C Antibody (Anti-HCV)    HIV Antigen/Antibody Screening [place]   5. Severe episode of recurrent major depressive disorder, without psychotic features (H)  FLUoxetine (PROZAC) 40 MG capsule    traZODone (DESYREL) 50 MG tablet   6. Marijuana dependence (H)     7. Excessive bleeding in premenopausal period  HM2(CBC w/o Differential)    Iron and Transferrin Iron Binding Capacity   8. Morbidly obese (H)  Comprehensive Metabolic Panel    Lipid Cascade    Thyroid Cascade    Vitamin D, Total (25-Hydroxy)    Glycosylated Hemoglobin A1c    CANCELED: Glycosylated Hemoglobin A1c   9. Panic disorder with agoraphobia     10. PTSD (post-traumatic stress disorder)     11. Cervical cancer screening     12. Gastroesophageal reflux disease without esophagitis  omeprazole (PRILOSEC) 20 MG capsule   13. Secondary oligomenorrhea     14. Infertility counseling  Ambulatory referral to Infertility   15. Allergy, initial encounter  Ambulatory referral to Allergy    ketotifen (ZADITOR/ZYRTEC ITCHY EYES) 0.025 % (0.035 %) ophthalmic solution   16. Easy bruising     17. Type 2 diabetes mellitus without complication, without long-term current use of insulin (H)  Ambulatory referral to Diabetes Education Program (CDE)    blood glucose test strips    blood glucose meter  (GLUCOMETER)    alcohol swabs PadM    lancets 32 gauge Misc    lancing device (LANCING DEVICE WITH LANCETS) Misc   18. Iron deficiency anemia due to chronic blood loss  ferrous gluconate (FERGON) 324 MG tablet    prenatal vit no.130-iron-folic (PRENATAL VITAMIN) 27 mg iron- 800 mcg Tab tablet   19. Chronic hepatitis B (H)     20. BMI 45.0-49.9, adult (H)     21. CKD (chronic kidney disease) stage 1, GFR 90 ml/min or greater        No future appointments.    Completed by: Abril Martin M.D., [facility]. 5/5/2021 9:12 AM.  This transcription uses voice recognition software, which may contain typographical errors.

## 2021-06-17 NOTE — TELEPHONE ENCOUNTER
Left message x 3. Please review message thread below and advise the patient as indicated. Please schedule if necessary or indicated in message thread. Letter sent.

## 2021-06-17 NOTE — TELEPHONE ENCOUNTER
Left message #3 at [phone]. If patient returns call back, please help patient schedule an appointment per message below. Thanks! We have made several attempts to contact patient by phone and letter to schedule an appointment. Unfortunately, our calls have not been returned and we were unable to schedule. At this time, we will no longer make an attempt to schedule this appointment. Completing task.

## 2021-06-18 ENCOUNTER — COMMUNICATION - HEALTHEAST (OUTPATIENT)
Dept: EDUCATION SERVICES | Facility: CLINIC | Age: 36
End: 2021-06-18

## 2021-06-21 NOTE — LETTER
Letter by Abril Martin MD at      Author: Abril Martin MD Service: -- Author Type: --    Filed:  Encounter Date: 5/4/2021 Status: (Other)         Monica Doshi  [address] [place] [address]          5/4/2021    Re: Monica Doshi  YOB: 1985      Dear Monica;    Diabetes  - you have type 2 diabetes  - your A1c is 8.2%. It should be less than 7%.  - see diabetes educator to learn how to check your sugars, and learn to eat & exercise to take good care of your diabetes.    Vitamin D deficiency  -start vitamin D (we will send in a prescription).    Iron Deficiency Anemia  - your red blood cells are low  -start ferrous gluconate (iron) every other day for a minimum of six months    Hepatitis B  - no detectable virus  - no sign of liver cancer or injury  - you don't have hepatitis C or HIV    Cholesterol  - LDL is good, triglycerides are normal (for not fasting)  - your HDL is a little low. Regular exercise will help with this.    NORMAL thyroid & Kidney tests        Physical on 04/30/2021   Component Date Value Ref Range Status   ? AFP-Tumor Marker 04/30/2021 <2.0  <=8.5 ug/mL Final   ? Hep B Virus DNA Quant IU/mL 04/30/2021 HBV DNA Not Detected  HBVND [IU]/mL Final   ? Hep B Virus DNA Quant Log IU/mL 04/30/2021 Not Calculated  <1.3 [Log_IU]/mL Final   ? Bilirubin, Total 04/30/2021 0.5  0.0 - 1.0 mg/dL Final   ? Bilirubin, Direct 04/30/2021 0.1  <=0.5 mg/dL Final   ? Protein, Total 04/30/2021 7.4  6.0 - 8.0 g/dL Final   ? Albumin 04/30/2021 3.8  3.5 - 5.0 g/dL Final   ? Alkaline Phosphatase 04/30/2021 82  45 - 120 U/L Final   ? AST 04/30/2021 16  0 - 40 U/L Final   ? ALT 04/30/2021 27  0 - 45 U/L Final   ? Hepatitis Be Antigen 04/30/2021 Negative  Negative Final   ? Hepatitis C Ab 04/30/2021 Negative  Negative Final   ? HIV Antigen / Antibody 04/30/2021 Negative  Negative Final   ? WBC 04/30/2021 11.0  4.0 - 11.0 thou/uL Final   ? RBC 04/30/2021 4.95  3.80 - 5.40 mill/uL Final   ? Hemoglobin  04/30/2021 10.4* 12.0 - 16.0 g/dL Final   ? Hematocrit 04/30/2021 34.6* 35.0 - 47.0 % Final   ? MCV 04/30/2021 70* 80 - 100 fL Final   ? MCH 04/30/2021 21.0* 27.0 - 34.0 pg Final   ? MCHC 04/30/2021 30.1* 32.0 - 36.0 g/dL Final   ? RDW 04/30/2021 16.0* 11.0 - 14.5 % Final   ? Platelets 04/30/2021 317  140 - 440 thou/uL Final   ? MPV 04/30/2021 10.3* 7.0 - 10.0 fL Final   ? Sodium 04/30/2021 137  136 - 145 mmol/L Final   ? Potassium 04/30/2021 3.8  3.5 - 5.0 mmol/L Final   ? Chloride 04/30/2021 101  98 - 107 mmol/L Final   ? CO2 04/30/2021 24  22 - 31 mmol/L Final   ? Anion Gap, Calculation 04/30/2021 12  5 - 18 mmol/L Final   ? Glucose 04/30/2021 302* 70 - 125 mg/dL Final   ? BUN 04/30/2021 17  8 - 22 mg/dL Final   ? Creatinine 04/30/2021 0.88  0.60 - 1.10 mg/dL Final   ? GFR MDRD Af Amer 04/30/2021 >60  >60 mL/min/1.73m2 Final   ? GFR MDRD Non Af Amer 04/30/2021 >60  >60 mL/min/1.73m2 Final   ? Bilirubin, Total 04/30/2021 0.5  0.0 - 1.0 mg/dL Final   ? Calcium 04/30/2021 9.1  8.5 - 10.5 mg/dL Final   ? Protein, Total 04/30/2021 7.4  6.0 - 8.0 g/dL Final   ? Albumin 04/30/2021 3.8  3.5 - 5.0 g/dL Final   ? Alkaline Phosphatase 04/30/2021 82  45 - 120 U/L Final   ? AST 04/30/2021 16  0 - 40 U/L Final   ? ALT 04/30/2021 27  0 - 45 U/L Final   ? Cholesterol 04/30/2021 191  <=199 mg/dL Final   ? Triglycerides 04/30/2021 220* <=149 mg/dL Final   ? HDL Cholesterol 04/30/2021 41* >=50 mg/dL Final   ? LDL Calculated 04/30/2021 106  <=129 mg/dL Final   ? Patient Fasting > 8hrs? 04/30/2021 No   Final   ? TSH 04/30/2021 1.17  0.30 - 5.00 uIU/mL Final   ? Vitamin D, Total (25-Hydroxy) 04/30/2021 15.5* 30.0 - 80.0 ng/mL Final   ? Hemoglobin A1c 04/30/2021 8.2* <=5.6 % Final   ? Iron 04/30/2021 17* 42 - 175 ug/dL Final   ? Transferrin 04/30/2021 311  212 - 360 mg/dL Final   ? Transferrin Saturation, Calculated 04/30/2021 4* 20 - 50 % Final   ? Transferrin IBC, Calculated 04/30/2021 389  313 - 563 ug/dL Final       If you have any  questions, please call [phone].    Sincerely,    Abril Martin MD  Family Physician  [facility]

## 2021-06-21 NOTE — LETTER
Letter by Abril Martin MD at      Author: Abril Martin MD Service: -- Author Type: --    Filed:  Encounter Date: 5/6/2021 Status: (Other)         Monica Doshi  [address] [place] [address]             May 11, 2021        Dear Ms. Doshi,    Below are the results from your recent visit:     your A1c is 8.2%. It should be less than 7%.   - see diabetes educator to learn how to check your sugars, and learn to eat & exercise to take good  care of your diabetes.      Vitamin D deficiency:   -start vitamin D (we will send in a prescription).      Iron Deficiency Anemia   - your red blood cells are low   -start ferrous gluconate (iron) every other day for a minimum of six months       Hepatitis B:   - no detectable virus   - no sign of liver cancer or injury   - you don't have hepatitis C or HIV       Cholesterol   - LDL is good, triglycerides are normal (for not fasting)   - your HDL is a little low. Regular exercise will help with this    Resulted Orders   AFP-Tumor Marker   Result Value Ref Range    AFP-Tumor Marker <2.0 <=8.5 ug/mL    Narrative    Effective April 22, 2013 the AFP-Tumor Marker is the Abbott Alpha-Fetoprotein (AFP)  On the  platform which continues to be calculated from the WHO 1st International Standard (72:225). Reference values are for the adult males and non-pregnant females only; no reference ranges are established for infants and children up to age 2.   Hepatitis B DNA Quantitative Real-Time PCR(HBQNT)   Result Value Ref Range    Hep B Virus DNA Quant IU/mL HBV DNA Not Detected HBVND [IU]/mL      Comment:      The AJIT AmpliPrep/AJIT TaqMan HBV Test is an FDA-approved in vitro nucleic  acid amplification test for the quantitation of HBV DNA in human plasma (EDTA  plasma) or serum using the AJIT AmpliPrep Instrument for automated viral  nucleic acid extraction and the AJIT TaqMan Analyzer or Gnip TaqMan for the  automated Real Time PCR amplification and detection of  viral nucleic acid  target.  Titer results are reported in International Units/mL (IU/mL) using the 1st WHO  International standard for HBV for Nucleic Acid Amplification based assays.    Hep B Virus DNA Quant Log IU/mL Not Calculated <1.3 [Log_IU]/mL      Comment:        Performed and/or entered by:  [facility]  [address] [place] [address]   Hepatic Profile   Result Value Ref Range    Bilirubin, Total 0.5 0.0 - 1.0 mg/dL    Bilirubin, Direct 0.1 <=0.5 mg/dL    Protein, Total 7.4 6.0 - 8.0 g/dL    Albumin 3.8 3.5 - 5.0 g/dL    Alkaline Phosphatase 82 45 - 120 U/L    AST 16 0 - 40 U/L    ALT 27 0 - 45 U/L   Hepatitis Be Antigen   Result Value Ref Range    Hepatitis Be Antigen Negative Negative      Comment:      Performed by [facility]  [address] [place] [id number] [phone]  [email or web address], Francy Ghosh MD, Lab. Director   Hepatitis C Antibody (Anti-HCV)   Result Value Ref Range    Hepatitis C Ab Negative Negative   HIV Antigen/Antibody Screening Cascade   Result Value Ref Range    HIV Antigen / Antibody Negative Negative    Narrative    Method is Abbott HIV Ag/Ab for the detection of HIV p24 antigen, HIV-1 antibodies and HIV-2 antibodies.   HM2(CBC w/o Differential)   Result Value Ref Range    WBC 11.0 4.0 - 11.0 thou/uL    RBC 4.95 3.80 - 5.40 mill/uL    Hemoglobin 10.4 (L) 12.0 - 16.0 g/dL    Hematocrit 34.6 (L) 35.0 - 47.0 %    MCV 70 (L) 80 - 100 fL    MCH 21.0 (L) 27.0 - 34.0 pg    MCHC 30.1 (L) 32.0 - 36.0 g/dL    RDW 16.0 (H) 11.0 - 14.5 %    Platelets 317 140 - 440 thou/uL    MPV 10.3 (H) 7.0 - 10.0 fL   Comprehensive Metabolic Panel   Result Value Ref Range    Sodium 137 136 - 145 mmol/L    Potassium 3.8 3.5 - 5.0 mmol/L    Chloride 101 98 - 107 mmol/L    CO2 24 22 - 31 mmol/L    Anion Gap, Calculation 12 5 - 18 mmol/L    Glucose 302 (H) 70 - 125 mg/dL    BUN 17 8 - 22 mg/dL    Creatinine 0.88 0.60 - 1.10 mg/dL    GFR MDRD Af Amer >60 >60  mL/min/1.73m2    GFR MDRD Non Af Amer >60 >60 mL/min/1.73m2    Bilirubin, Total 0.5 0.0 - 1.0 mg/dL    Calcium 9.1 8.5 - 10.5 mg/dL    Protein, Total 7.4 6.0 - 8.0 g/dL    Albumin 3.8 3.5 - 5.0 g/dL    Alkaline Phosphatase 82 45 - 120 U/L    AST 16 0 - 40 U/L    ALT 27 0 - 45 U/L    Narrative    Fasting Glucose reference range is 70-99 mg/dL per  American Diabetes Association (ADA) guidelines.   Lipid Cascade   Result Value Ref Range    Cholesterol 191 <=199 mg/dL    Triglycerides 220 (H) <=149 mg/dL    HDL Cholesterol 41 (L) >=50 mg/dL    LDL Calculated 106 <=129 mg/dL    Patient Fasting > 8hrs? No    Thyroid Cascade   Result Value Ref Range    TSH 1.17 0.30 - 5.00 uIU/mL   Vitamin D, Total (25-Hydroxy)   Result Value Ref Range    Vitamin D, Total (25-Hydroxy) 15.5 (L) 30.0 - 80.0 ng/mL    Narrative    Deficiency <10.0 ng/mL  Insufficiency 10.0-29.9 ng/mL  Sufficiency 30.0-80.0 ng/mL  Toxicity (possible) >100.0 ng/mL   Glycosylated Hemoglobin A1c   Result Value Ref Range    Hemoglobin A1c 8.2 (H) <=5.6 %   Iron and Transferrin Iron Binding Capacity   Result Value Ref Range    Iron 17 (L) 42 - 175 ug/dL    Transferrin 311 212 - 360 mg/dL    Transferrin Saturation, Calculated 4 (L) 20 - 50 %    Transferrin IBC, Calculated 389 313 - 563 ug/dL            Please call with questions or contact us using [employer].    Sincerely,        Electronically signed by Abril Martin MD

## 2021-06-21 NOTE — LETTER
Author: -- Service: -- Author Type: --    Filed:  Encounter Date: 5/10/2021 Status: (Other)       05/10/21   Re: Monica Doshi  Birthday: 1985          Thank you for your referral to the Diabetes Education Program. This is a notification to let you know that we were unable to reach the patient to arrange an appointment.     If you have any questions or concerns, please contact our office at [phone]        Sincerely,    Diabetes Education Scheduling

## 2021-06-21 NOTE — LETTER
Letter by Abril Martin MD at      Author: Abril Martin MD Service: -- Author Type: --    Filed:  Encounter Date: 5/5/2021 Status: (Other)         Monica Longg  [address] [place] [address]      May 5, 2021      Dear Monica,    As a valued [facility] patient, your healthcare needs are our priority.  Your health care team has determined that you are due for an appointment regarding your Office visit in one month.    To help prevent delays in your care, please call the [facility] at [phone].    We look forward to partnering with you to achieve optimal health and wellbeing.    Sincerely,  Your care team at [facility]

## 2021-06-21 NOTE — LETTER
Letter by Blanca Shay at      Author: Blanca Shay Service: -- Author Type: --    Filed:  Encounter Date: 3/9/2021 Status: (Other)         Monica Doshi  [address] [place] [address]      March 9, 2021      Dear Monica,    As a valued [facility] patient, your healthcare needs are our priority.  Your health care team has determined that you are due for an appointment regarding your office visit.    To help prevent delays in your care, please call the [facility] at [phone].    We look forward to partnering with you to achieve optimal health and wellbeing.    Sincerely,  Your care team at [facility]

## 2021-06-25 NOTE — PROGRESS NOTES
Monica Doshi is a 35 y.o. female who is being evaluated via a billable video visit.      How would you like to obtain your AVS? [email or web address].  If dropped from the video visit, the video invitation should be resent by: Other e-mail: connected on [employer]   Will anyone else be joining your video visit? No    ____________________________    Virtual Visit - Video Encounter  [facility]  Family Medicine  Date of Service: 5/27/2021    Subjective:    Diabetes  Type 2 diabetes is a new diagnosis.  Has appt 6/9/21 to meet with diabetes educator. Needs testing supplies. Nervous about testing.  Has been eating better since diagnosis.    Anemia  Irregular/heavy menses  Hasn't seen specialist yet  Has iron pills, but didn't know what they were for and hasn't started them yet.    Vitamin D deficiency  Has vitamin D pills.    Constipation  Infrequent, small, hard BM. Has urge to go, but only a small piece comes out.  Chronic constipation, currently worse.  Can't eat, stomach is hurting.  Gas pains.  Heartburn after eating every time.  Hurts so much she can't sleep  Cutting back on rice - off and on.  Drinks water all day.  Normal TSH.     Objective:  Last menstrual period 05/11/2021.   GENERAL: sitting comfortably, alert, and in no apparent distress. RESPIRATORY: No retractions, no nasal flaring, overall work of breathing. No audible wheezing, stridor, cough. SKIN: No rashes, bruising or other skin lesions   No visible edema.    No results found.   Assessment & Plan:  1. Constipation with obstipation.     Cleanout with miralax - 17 g three times a day for two days, then two times a day for two days, then daily.     Fluids: minimium 10 8-oz glasses of water per day    Diet: bland (boiled veggies and chicken soup) until BM soft and regular. Avoid constipating foods.    Activity: 30 minutes daily    Follow up with me in July  2. Type 2 diabetes    Discussed A1c goal of <7%. Probably doesn't need medication at this  point.    Re-sent testing supplies    Meet with diabetes educator on 6/9/21    Excellent work with dietary changes  3. Iron deficiency anemia    Due to menses.    Start iron and Prenatal vitamin once constipation resolves  4. Vitamin D deficiency    Start vitamin D pills  5. Infertility    Will schedule with fertility specialists      Order Summary                                                      1. Constipation, unspecified constipation type  polyethylene glycol (GLYCOLAX) 17 gram/dose powder   2. Gastroesophageal reflux disease, unspecified whether esophagitis present  famotidine (PEPCID) 20 MG tablet   3. Allergy, initial encounter  cetirizine (ZYRTEC) 10 MG tablet   4. Type 2 diabetes mellitus without complication, without long-term current use of insulin (H)  alcohol swabs PadM    blood glucose meter (GLUCOMETER)    lancets 32 gauge Misc    lancing device (LANCING DEVICE WITH LANCETS) Misc      Future Appointments   Date Time Provider Department Center   6/9/2021  1:00 PM Lisa Rich, Diabetes Ed C DIABED [facility]       Completed by: Abril Martin M.D., [facility]. 5/27/2021 11:48 AM.  This transcription uses voice recognition software, which may contain typographical errors.  ____________________________  Start visit: 11:48 AM  End visit: 12:23 PM  Video-Visit Details    Type of service:  Video Visit    Video End Time (time video stopped): 12:23 pM  Originating Location (pt. Location): Home    Distant Location (provider location):  [facility]     Platform used for Video Visit: JimWell

## 2021-06-25 NOTE — TELEPHONE ENCOUNTER
Reason for Call:  Medication or medication refill:    Do you use a [facility]?  Name of the pharmacy and phone number for the current request: No, [facility] on file    Name of the medication requested: Test strips    Other request: Patient called and would like provider to sent RX for test strips to pharmacy on file. She got the other RX for lancets and needles, but no RX for test strips. Please send RX to pharmacy on file.    Can we leave a detailed message on this number? Yes    Phone number patient can be reached at: Home number on file [phone] (home)    Best Time: any    Call taken on 5/27/2021 at 12:29 PM by Thor Damon

## 2021-06-25 NOTE — TELEPHONE ENCOUNTER
Refill Approved    Rx renewed per Medication Renewal Policy. Medication was last renewed on 3/2/21 .    Adelia Pride, [facility] Connection Triage/Med Refill 5/27/2021     Requested Prescriptions   Pending Prescriptions Disp Refills     cetirizine (ZYRTEC) 10 MG tablet 30 tablet 0     Sig: Take 1 tablet (10 mg total) by mouth daily.       Antihistamine Refill Protocol Passed - 5/26/2021  1:06 PM        Passed - Patient has had office visit/physical in last year     Last office visit with prescriber/PCP: Visit date not found OR same dept: Visit date not found OR same specialty: 11/8/2019 Kevin Staley MD  Last physical: Visit date not found Last MTM visit: Visit date not found   Next visit within 3 mo: Visit date not found  Next physical within 3 mo: Visit date not found  Prescriber OR PCP: Alanis Richard MD  Last diagnosis associated with med order: 1. Allergy, initial encounter  - cetirizine (ZYRTEC) 10 MG tablet; Take 1 tablet (10 mg total) by mouth daily.  Dispense: 30 tablet; Refill: 0    If protocol passes may refill for 12 months if within 3 months of last provider visit (or a total of 15 months).

## 2021-07-04 NOTE — LETTER
Letter by Abril Martin MD at      Author: Abril Martin MD Service: -- Author Type: --    Filed:  Encounter Date: 6/18/2021 Status: (Other)       06/18/21  Re: Monica Doshi  Birthday: 1985          Dear Colleague,      Thank you for your referral to the Diabetes Education Program.    We have made multiple attempts to contact patient with no response back. We will no longer contact patient to schedule.     If you have any questions or concerns, please contact our office at [phone].        Sincerely,   Clinician and Staff of [facility]

## 2021-07-04 NOTE — ADDENDUM NOTE
Addendum Note by Abril Martin MD at 5/25/2021  2:36 PM     Author: Abril Martin MD Service: -- Author Type: Physician    Filed: 5/25/2021  2:36 PM Encounter Date: 5/6/2021 Status: Signed    : Abril Martin MD (Physician)    Addended by: ABRIL MARTIN on: 5/25/2021 02:36 PM        Modules accepted: Orders

## 2021-07-28 ENCOUNTER — TELEPHONE (OUTPATIENT)
Dept: FAMILY MEDICINE | Facility: CLINIC | Age: 36
End: 2021-07-28

## 2021-07-28 NOTE — TELEPHONE ENCOUNTER
Reason for call:  Patient reporting a symptom    Symptom or request:heat rash    Duration (how long have symptoms been present):1 day    Have you been treated for this before? No    Additional comments: air conditioner went out at work, she has a bad rash,,itchy , its very irritating   she had to leave work went home to shower it felt better but the rash is still there around the bra area, she wants to know what to do about it she wants a work slip to excuse her from work if its going to take a few days to go away .  She has been trying  To send a [employer] message to  and hasnt been able to do that  Phone Number patient can be reached at:  Home number on file [phone] (home)    Best Time:  anytime    Can we leave a detailed message on this number:  YES    Call taken on 7/28/2021 at 10:59 AM by Rosa Thacker

## 2021-07-29 ENCOUNTER — VIRTUAL VISIT (OUTPATIENT)
Dept: FAMILY MEDICINE | Facility: CLINIC | Age: 36
End: 2021-07-29
Payer: COMMERCIAL

## 2021-07-29 DIAGNOSIS — R61 GENERALIZED HYPERHIDROSIS: ICD-10-CM

## 2021-07-29 DIAGNOSIS — L30.9 DERMATITIS: Primary | ICD-10-CM

## 2021-07-29 PROBLEM — R23.3 EASY BRUISING: Status: ACTIVE | Noted: 2021-05-05

## 2021-07-29 PROBLEM — N91.4 SECONDARY OLIGOMENORRHEA: Status: ACTIVE | Noted: 2021-05-05

## 2021-07-29 PROBLEM — D50.0 IRON DEFICIENCY ANEMIA DUE TO CHRONIC BLOOD LOSS: Status: ACTIVE | Noted: 2021-05-05

## 2021-07-29 PROCEDURE — 99213 OFFICE O/P EST LOW 20 MIN: CPT | Mod: 95 | Performed by: FAMILY MEDICINE

## 2021-07-29 RX ORDER — FERROUS GLUCONATE 324(38)MG
324 TABLET ORAL
COMMUNITY
Start: 2021-04-30 | End: 2022-02-17

## 2021-07-29 RX ORDER — FLUOXETINE 40 MG/1
40 CAPSULE ORAL
COMMUNITY
Start: 2020-06-18 | End: 2022-07-27

## 2021-07-29 RX ORDER — TRAZODONE HYDROCHLORIDE 50 MG/1
50 TABLET, FILM COATED ORAL
COMMUNITY
Start: 2020-06-18 | End: 2022-02-17

## 2021-07-29 RX ORDER — ERGOCALCIFEROL 1.25 MG/1
50000 CAPSULE ORAL
COMMUNITY
Start: 2021-05-04 | End: 2022-02-17

## 2021-07-29 RX ORDER — SUCRALFATE 1 G/1
1 TABLET ORAL
COMMUNITY
Start: 2020-10-30 | End: 2022-02-17

## 2021-07-29 RX ORDER — FAMOTIDINE 20 MG/1
20 TABLET, FILM COATED ORAL
COMMUNITY
Start: 2021-05-27 | End: 2022-02-17

## 2021-07-29 RX ORDER — TRIAMCINOLONE ACETONIDE 1 MG/G
OINTMENT TOPICAL 2 TIMES DAILY
Qty: 80 G | Refills: 1 | Status: SHIPPED | OUTPATIENT
Start: 2021-07-29 | End: 2022-02-17

## 2021-07-29 RX ORDER — DIPHENHYDRAMINE HCL 25 MG
CAPSULE ORAL
COMMUNITY
Start: 2021-03-29 | End: 2022-02-17

## 2021-07-29 RX ORDER — ALBUTEROL SULFATE 90 UG/1
2 AEROSOL, METERED RESPIRATORY (INHALATION)
COMMUNITY
Start: 2019-11-08 | End: 2022-01-25

## 2021-07-29 RX ORDER — FLURBIPROFEN 100 MG
100 TABLET ORAL
COMMUNITY
Start: 2019-10-11 | End: 2022-02-17

## 2021-07-29 RX ORDER — PRENATAL VIT/IRON FUM/FOLIC AC 27MG-0.8MG
1 TABLET ORAL
COMMUNITY
Start: 2021-04-30 | End: 2022-02-17

## 2021-07-29 RX ORDER — POLYETHYLENE GLYCOL 3350 17 G/17G
POWDER, FOR SOLUTION ORAL
COMMUNITY
Start: 2021-05-27 | End: 2021-08-29

## 2021-07-29 RX ORDER — LANCETS
1 EACH MISCELLANEOUS
COMMUNITY
Start: 2021-04-30 | End: 2022-02-17

## 2021-07-29 RX ORDER — BLOOD SUGAR DIAGNOSTIC
1 STRIP MISCELLANEOUS
COMMUNITY
Start: 2021-05-27 | End: 2022-02-17

## 2021-07-29 RX ORDER — ACETAMINOPHEN 500 MG
TABLET ORAL
COMMUNITY
Start: 2021-05-20 | End: 2022-02-17

## 2021-07-29 RX ORDER — CETIRIZINE HYDROCHLORIDE 10 MG/1
10 TABLET ORAL
COMMUNITY
Start: 2021-05-27 | End: 2022-02-17

## 2021-07-29 NOTE — PROGRESS NOTES
Monica is a 35 year old who is being evaluated via a billable telephone visit.      What phone number would you like to be contacted at?[phone]   How would you like to obtain your AVS? MyChart  ____________________________    Virtual Visit - Telephone Encounter  [facility]  Date of Service: 7/29/2021    Subjective:    Air conditioner at work went out on Tuesday 7/27.  Started getting itchy under bra strap on the side.  Today doing OK.  Has been indoors and stay cool  Very itchy  Rash getting better    How to prevent from happening again?  How to treat it?    Objective:         Speech is normal  Patient is calm  Assessment & Plan:    Dermatitis - due to heat. Keep skin dry and cool. Use triamcinolone oint 2-3 times a day.    Order Summary    ICD-10-CM    1. Dermatitis  L30.9 triamcinolone (KENALOG) 0.1 % external ointment   2. Generalized hyperhidrosis  R61    No future appointments.   Completed by: Abril Martin M.D., [facility]. 7/29/2021 1:34 PM.  This transcription uses voice recognition software, which may contain typographical errors.  Start call: 1:34 PM. End call: 1:51 PM .  ____________________________    Phone call duration: 15 minutes

## 2021-07-29 NOTE — LETTER
July 29, 2021      Monica Doshi  [address]  [place] [address]        To Whom It May Concern:    Monica Doshi  was seen on 7/29/2021.  Please excuse her from 7/28/21 through 7/30/21 due to illness. She may return to work on 7/31/21 with the following restrictions: avoid exposure to prolonged or excessive heat.    Sincerely,        Abril Martin MD

## 2021-08-01 ENCOUNTER — HEALTH MAINTENANCE LETTER (OUTPATIENT)
Age: 36
End: 2021-08-01

## 2021-08-02 ENCOUNTER — TELEPHONE (OUTPATIENT)
Dept: FAMILY MEDICINE | Facility: CLINIC | Age: 36
End: 2021-08-02

## 2021-08-02 DIAGNOSIS — E11.9 TYPE 2 DIABETES MELLITUS WITHOUT COMPLICATION, WITHOUT LONG-TERM CURRENT USE OF INSULIN (H): Primary | ICD-10-CM

## 2021-08-02 NOTE — TELEPHONE ENCOUNTER
Reason for Call:  Other appointment    Detailed comments: pt called to verify that she was scheduled to see  Diabetes educator tomorrow the schedule shows she has a appt with  there is  No referral for diabetes appt. Does  want to see her or have her see educator? If educator a referral will need to be placed in Epic.  Pt states she already talked to  about her diabetes   Phone Number Patient can be reached at: Home number on file [phone] (home)    Best Time: asap     appt is scheduled for 08/03    Can we leave a detailed message on this number? YES    Call taken on 8/2/2021 at 2:05 PM by Rosa Thacker

## 2021-08-03 PROBLEM — E55.9 VITAMIN D DEFICIENCY: Status: ACTIVE | Noted: 2021-08-03

## 2021-08-03 PROBLEM — R74.8 LOW SERUM HIGH DENSITY LIPOPROTEIN (HDL): Status: ACTIVE | Noted: 2021-08-03

## 2021-08-03 NOTE — TELEPHONE ENCOUNTER
Order is in  Diagnoses and all orders for this visit:    Type 2 diabetes mellitus without complication, without long-term current use of insulin (H)  -     AMB Adult Diabetes Educator Referral; Future

## 2021-09-26 ENCOUNTER — HEALTH MAINTENANCE LETTER (OUTPATIENT)
Age: 36
End: 2021-09-26

## 2021-11-20 ENCOUNTER — HEALTH MAINTENANCE LETTER (OUTPATIENT)
Age: 36
End: 2021-11-20

## 2022-01-25 DIAGNOSIS — Z76.0 ENCOUNTER FOR MEDICATION REFILL: Primary | ICD-10-CM

## 2022-01-25 DIAGNOSIS — J45.20 MILD INTERMITTENT ASTHMA WITHOUT COMPLICATION: ICD-10-CM

## 2022-01-25 RX ORDER — ALBUTEROL SULFATE 90 UG/1
2 AEROSOL, METERED RESPIRATORY (INHALATION) EVERY 6 HOURS PRN
Qty: 18 G | Refills: 0 | Status: SHIPPED | OUTPATIENT
Start: 2022-01-25 | End: 2022-01-28

## 2022-01-25 NOTE — TELEPHONE ENCOUNTER
Rx sent. It looks like she's due for a check up. Please schedule a physical.    Monica was seen today for refill request.    Diagnoses and all orders for this visit:    Encounter for medication refill  -     albuterol (PROAIR HFA/PROVENTIL HFA/VENTOLIN HFA) 108 (90 Base) MCG/ACT inhaler; Inhale 2 puffs into the lungs every 6 hours as needed for shortness of breath / dyspnea or wheezing    Mild intermittent asthma without complication        No future appointments.   Health Maintenance Due   Topic Date Due     MICROALBUMIN  Never done     DIABETIC FOOT EXAM  Never done     DEPRESSION ACTION PLAN  Never done     EYE EXAM  Never done     URINALYSIS  Never done     Pneumococcal Vaccine: Pediatrics (0 to 5 Years) and At-Risk Patients (6 to 64 Years) (1 of 2 - PPSV23) Never done     PAP  Never done     DTAP/TDAP/TD IMMUNIZATION (2 - Td or Tdap) 09/17/2019     COVID-19 Vaccine (2 - Booster for Kristen series) 06/03/2021     A1C  07/30/2021     INFLUENZA VACCINE (1) 09/01/2021     PHQ-9  10/30/2021     BP Readings from Last 3 Encounters:   04/30/21 132/84   11/08/19 124/88   02/05/09 108/70

## 2022-01-25 NOTE — TELEPHONE ENCOUNTER
Medication Question or Refill    Who is calling: [facility] pharmacy    What medication are you calling about (include dose and sig)?: Albuterol inhaler inhale 2 puffs every 6 hrs as needed for wheezing    Who prescribed the medication?: Dr. Staley    Do you need a refill? Yes: script is     When did you use the medication last? unknown    Patient offered an appointment? No, last seen 21    Do you have any questions or concerns?  No    Okay to leave a detailed message?: No     Call taken on 22 at 1250 pm by Tanya Childs CMA, CMT

## 2022-01-26 NOTE — TELEPHONE ENCOUNTER
LMTCB #1. Postponing task to tomorrow to try again. If patient returns call back, please help patient schedule an appointment per message below. Thanks!

## 2022-01-27 ENCOUNTER — LAB (OUTPATIENT)
Dept: FAMILY MEDICINE | Facility: CLINIC | Age: 37
End: 2022-01-27
Payer: COMMERCIAL

## 2022-01-27 DIAGNOSIS — Z20.822 SUSPECTED COVID-19 VIRUS INFECTION: ICD-10-CM

## 2022-01-27 LAB — SARS-COV-2 RNA RESP QL NAA+PROBE: POSITIVE

## 2022-01-27 PROCEDURE — U0003 INFECTIOUS AGENT DETECTION BY NUCLEIC ACID (DNA OR RNA); SEVERE ACUTE RESPIRATORY SYNDROME CORONAVIRUS 2 (SARS-COV-2) (CORONAVIRUS DISEASE [COVID-19]), AMPLIFIED PROBE TECHNIQUE, MAKING USE OF HIGH THROUGHPUT TECHNOLOGIES AS DESCRIBED BY CMS-2020-01-R: HCPCS

## 2022-01-27 PROCEDURE — U0005 INFEC AGEN DETEC AMPLI PROBE: HCPCS

## 2022-01-27 NOTE — TELEPHONE ENCOUNTER
[facility] #[phone]. Postponing task to tomorrow to try again. If patient returns call back, please help patient schedule an appointment per message below. Thanks!

## 2022-01-28 ENCOUNTER — TELEPHONE (OUTPATIENT)
Dept: NURSING | Facility: CLINIC | Age: 37
End: 2022-01-28
Payer: COMMERCIAL

## 2022-01-28 ENCOUNTER — MYC MEDICAL ADVICE (OUTPATIENT)
Dept: FAMILY MEDICINE | Facility: CLINIC | Age: 37
End: 2022-01-28
Payer: COMMERCIAL

## 2022-01-28 DIAGNOSIS — Z76.0 ENCOUNTER FOR MEDICATION REFILL: Primary | ICD-10-CM

## 2022-01-28 RX ORDER — ALBUTEROL SULFATE 90 UG/1
2 AEROSOL, METERED RESPIRATORY (INHALATION) EVERY 6 HOURS PRN
Qty: 18 G | Refills: 3 | Status: SHIPPED | OUTPATIENT
Start: 2022-01-28 | End: 2022-02-17

## 2022-01-28 NOTE — TELEPHONE ENCOUNTER
[facility] #3 [phone]. We have made several attempts to contact patient by phone to schedule an appointment. If patient returns call back, please help patient schedule an appointment per message below. Thanks! Unfortunately, our calls have not been returned and we were unable to schedule. At this time, we will no longer make an attempt to schedule this appointment. Completing task.

## 2022-01-28 NOTE — TELEPHONE ENCOUNTER
"RN attempted to call patient regarding her mychart message:  DR. Martin,  i went a got a covid test done and the results came back positive.      what should i do?  am i suppose to take any kind of medication to make it go away?     what should i do  what are the next step i need to take?     can you help me with a list of \" to do\" and \"not to do\"  i'm just really worry.         Patient did not answer. Left non-detailed message to patient to call the clinic back.         Jagruti Pinedo RN  [facility]    "

## 2022-01-28 NOTE — TELEPHONE ENCOUNTER
Patient classified as COVID treatment eligible by Epic high risk algorithm:  Yes     Coronavirus (COVID-19) Notification    Reason for call  Notify of POSITIVE COVID-19 lab result, assess symptoms,  review [facility] recommendations    Lab Result   Lab test for 2019-nCoV rRt-PCR or SARS-COV-2 PCR  Oropharyngeal AND/OR nasopharyngeal swabs were POSITIVE for 2019-nCoV RNA [OR] SARS-COV-2 RNA (COVID-19) RNA     We have been unable to reach patient by phone at this time to notify of their Positive COVID-19 result.    Left voicemail message requesting a call back to [phone] [facility] for results.        A Positive COVID-19 letter will be sent via [employer] or the mail. (Exception, no letters sent to Presurgerical/Preprocedure Patients)    Grace Jorge

## 2022-01-31 ENCOUNTER — MYC MEDICAL ADVICE (OUTPATIENT)
Dept: FAMILY MEDICINE | Facility: CLINIC | Age: 37
End: 2022-01-31
Payer: COMMERCIAL

## 2022-01-31 DIAGNOSIS — E11.9 TYPE 2 DIABETES MELLITUS WITHOUT COMPLICATION, WITHOUT LONG-TERM CURRENT USE OF INSULIN (H): Primary | ICD-10-CM

## 2022-01-31 NOTE — TELEPHONE ENCOUNTER
"RN made 2nd attempt to call patient regarding her mychart message:  DR. Martin,  i went a got a covid test done and the results came back positive.      what should i do?  am i suppose to take any kind of medication to make it go away?     what should i do  what are the next step i need to take?     can you help me with a list of \" to do\" and \"not to do\"  i'm just really worry.         Patient did not answer. Left non-detailed message to patient to call the clinic back.         Jagruti Pinedo RN  [facility]  "

## 2022-02-01 RX ORDER — BLOOD SUGAR DIAGNOSTIC
1 STRIP MISCELLANEOUS DAILY PRN
Qty: 100 STRIP | Refills: 3 | Status: CANCELLED | OUTPATIENT
Start: 2022-02-01

## 2022-02-01 RX ORDER — GLUCOSAMINE HCL/CHONDROITIN SU 500-400 MG
CAPSULE ORAL
Qty: 100 EACH | Refills: 3 | Status: SHIPPED | OUTPATIENT
Start: 2022-02-01 | End: 2022-02-17

## 2022-02-01 RX ORDER — LANCETS
1 EACH MISCELLANEOUS DAILY PRN
Qty: 100 EACH | Refills: 3 | Status: CANCELLED | OUTPATIENT
Start: 2022-02-01

## 2022-02-01 RX ORDER — LANCETS
EACH MISCELLANEOUS
Qty: 100 EACH | Refills: 6 | Status: SHIPPED | OUTPATIENT
Start: 2022-02-01 | End: 2022-02-18

## 2022-02-01 NOTE — TELEPHONE ENCOUNTER
Dr. Martin,     Pt wondering if there is anyway or help for pt to get her Vitamin D and Zinc, as it is NOT covered by her insurance?    Writer replied to patient via AT Internet message.    RN pended blood glucose strips/lancets for refill.     Routing to PCP to review and approve.    ANABELA SandhuN, RN   [facility]

## 2022-02-01 NOTE — TELEPHONE ENCOUNTER
She will need to buy the vitamin D and zinc over the counter.They aren't terribly expensive.    Type 2 diabetes mellitus without complication, without long-term current use of insulin (H)  -     blood glucose monitoring (NO BRAND SPECIFIED) meter device kit; Use to test blood sugar 1 times daily or as directed. Preferred blood glucose meter OR supplies to accompany: Blood Glucose Monitor Brands: per insurance.  -     blood glucose (NO BRAND SPECIFIED) test strip; Use to test blood sugar 1 times daily or as directed. To accompany: Blood Glucose Monitor Brands: per insurance.  -     blood glucose calibration (NO BRAND SPECIFIED) solution; To accompany: Blood Glucose Monitor Brands: per insurance.  -     thin (NO BRAND SPECIFIED) lancets; Use with lanceting device. To accompany: Blood Glucose Monitor Brands: per insurance.  -     alcohol swab prep pads; Use to swab area of injection/nishant as directed.

## 2022-02-04 NOTE — TELEPHONE ENCOUNTER
I sent in a prescription for metformin 1000 mg. She should take it twice a day, with food.     Future Appointments   Date Time Provider Department [place]   2/11/2022  8:00 AM Heidy Miller RD [facility]     Monica was seen today for refill request.    Diagnoses and all orders for this visit:    Type 2 diabetes mellitus without complication, without long-term current use of insulin (H)  -     blood glucose monitoring (NO BRAND SPECIFIED) meter device kit; Use to test blood sugar 1 times daily or as directed. Preferred blood glucose meter OR supplies to accompany: Blood Glucose Monitor Brands: per insurance.  -     blood glucose (NO BRAND SPECIFIED) test strip; Use to test blood sugar 1 times daily or as directed. To accompany: Blood Glucose Monitor Brands: per insurance.  -     blood glucose calibration (NO BRAND SPECIFIED) solution; To accompany: Blood Glucose Monitor Brands: per insurance.  -     thin (NO BRAND SPECIFIED) lancets; Use with lanceting device. To accompany: Blood Glucose Monitor Brands: per insurance.  -     alcohol swab prep pads; Use to swab area of injection/nishant as directed.  -     metFORMIN (GLUCOPHAGE) 1000 MG tablet; Take 1 tablet (1,000 mg) by mouth 2 times daily (with meals)

## 2022-02-08 ENCOUNTER — VIRTUAL VISIT (OUTPATIENT)
Dept: FAMILY MEDICINE | Facility: CLINIC | Age: 37
End: 2022-02-08
Payer: COMMERCIAL

## 2022-02-08 ENCOUNTER — MYC MEDICAL ADVICE (OUTPATIENT)
Dept: FAMILY MEDICINE | Facility: CLINIC | Age: 37
End: 2022-02-08

## 2022-02-08 DIAGNOSIS — Z59.9 FINANCIAL PROBLEMS: Primary | ICD-10-CM

## 2022-02-08 DIAGNOSIS — E11.9 TYPE 2 DIABETES MELLITUS WITHOUT COMPLICATION, WITHOUT LONG-TERM CURRENT USE OF INSULIN (H): Primary | ICD-10-CM

## 2022-02-08 DIAGNOSIS — Z91.09 ENVIRONMENTAL ALLERGIES: ICD-10-CM

## 2022-02-08 PROCEDURE — 99214 OFFICE O/P EST MOD 30 MIN: CPT | Mod: TEL | Performed by: FAMILY MEDICINE

## 2022-02-08 RX ORDER — METFORMIN HCL 500 MG
500 TABLET, EXTENDED RELEASE 24 HR ORAL
Qty: 30 TABLET | Refills: 1 | Status: SHIPPED | OUTPATIENT
Start: 2022-02-08 | End: 2022-02-17

## 2022-02-08 RX ORDER — FLUTICASONE PROPIONATE 50 MCG
2 SPRAY, SUSPENSION (ML) NASAL DAILY
Qty: 16 G | Refills: 1 | Status: SHIPPED | OUTPATIENT
Start: 2022-02-08 | End: 2022-02-17

## 2022-02-08 SDOH — ECONOMIC STABILITY - INCOME SECURITY: PROBLEM RELATED TO HOUSING AND ECONOMIC CIRCUMSTANCES, UNSPECIFIED: Z59.9

## 2022-02-08 ASSESSMENT — ASTHMA QUESTIONNAIRES
QUESTION_4 LAST FOUR WEEKS HOW OFTEN HAVE YOU USED YOUR RESCUE INHALER OR NEBULIZER MEDICATION (SUCH AS ALBUTEROL): NOT AT ALL
QUESTION_1 LAST FOUR WEEKS HOW MUCH OF THE TIME DID YOUR ASTHMA KEEP YOU FROM GETTING AS MUCH DONE AT WORK, SCHOOL OR AT HOME: NONE OF THE TIME
QUESTION_5 LAST FOUR WEEKS HOW WOULD YOU RATE YOUR ASTHMA CONTROL: WELL CONTROLLED
ACT_TOTALSCORE: 24
QUESTION_2 LAST FOUR WEEKS HOW OFTEN HAVE YOU HAD SHORTNESS OF BREATH: NOT AT ALL
ACT_TOTALSCORE: 24
QUESTION_3 LAST FOUR WEEKS HOW OFTEN DID YOUR ASTHMA SYMPTOMS (WHEEZING, COUGHING, SHORTNESS OF BREATH, CHEST TIGHTNESS OR PAIN) WAKE YOU UP AT NIGHT OR EARLIER THAN USUAL IN THE MORNING: NOT AT ALL

## 2022-02-08 ASSESSMENT — PATIENT HEALTH QUESTIONNAIRE - PHQ9: SUM OF ALL RESPONSES TO PHQ QUESTIONS 1-9: 0

## 2022-02-08 NOTE — LETTER
February 8, 2022      Monica Doshi  [address] [place] [address]        To Whom It May Concern:    Monica Doshi  was seen on 2/8/2022.      Please excuse her from 1/27/22 through 2/23/22, due to acute covid infection and post-covid symptoms. She may return to work on 2/24/22.    Sincerely,        Abril Martin MD

## 2022-02-08 NOTE — PROGRESS NOTES
Monica is a 36 year old who is being evaluated via a billable telephone visit.      What phone number would you like to be contacted at? [phone]  How would you like to obtain your AVS? MyChart  ____________________________    Virtual Visit - Telephone Encounter  [facility] Medicine  Date of Service: 2/8/2022    Subjective:  Chief Complaint   Patient presents with     Covid Concern      Diarrhea  Started metformin  Needs to have BM and it's liquid.  Checking blood sugar  Researching diet.  Craving carbs    Sugars  151, 149, 121, 123, 103, 166  89 2:50 PM    Covid  Feels fine at rest.  Exhausted with even low levels of activity - sweeping the floor, walking around the house.  Not able to work right now.    Objective:     Speech is normal  Patient is calm  No visits with results within 1 Week(s) from this visit.   Latest known visit with results is:   Lab on 01/27/2022   Component Date Value Ref Range Status     SARS CoV2 PCR 01/27/2022 Positive* Negative, Testing sent to reference lab. Results will be returned via unsolicited result Final    POSITIVE: SARS-CoV-2 (COVID-19) RNA detected, presumed positive.     No results found.   Assessment & Plan:    DM2, not on insulin. Blood sugars mildly elevated. Experiencing diarrhea. Possibly from Covid, but suspect that it's due to metformin. Change metformin from metformin 1000 mg twice daily to metformin  mg with biggest meal. Seeing diabetes educator on 2/11. Will log diet and check BG AM preprandial and 2 hours after start of meals until then.     Covid infection - experiencing significant fatigue. Work note written for excused absence 1/27-2/23/22.    Order Summary     ICD-10-CM    1. Type 2 diabetes mellitus without complication, without long-term current use of insulin (H)  E11.9 metFORMIN (GLUCOPHAGE-XR) 500 MG 24 hr tablet   2. Environmental allergies  Z91.09 fluticasone (FLONASE) 50 MCG/ACT nasal spray     Future  Appointments   Date Time Provider Department Center   2/11/2022  8:00 AM Heidy Miller, [facility]      Completed by: Abril Martin M.D., [facility]. 2/8/2022 3:13 PM.  This transcription uses voice recognition software, which may contain typographical errors.  Start call: 3:13 PM. End call: 3:49 PM .  ____________________________    Phone call duration: 38 minutes

## 2022-02-15 ENCOUNTER — TELEPHONE (OUTPATIENT)
Dept: FAMILY MEDICINE | Facility: CLINIC | Age: 37
End: 2022-02-15
Payer: COMMERCIAL

## 2022-02-15 ENCOUNTER — PATIENT OUTREACH (OUTPATIENT)
Dept: CARE COORDINATION | Facility: CLINIC | Age: 37
End: 2022-02-15
Payer: COMMERCIAL

## 2022-02-15 NOTE — TELEPHONE ENCOUNTER
When I got this message, I recommended a virtual visit. It looks like that hasn't been scheduled yet. Please schedule.  I'll put in a [facility] referral, to help with financial.  No future appointments.   Diagnoses and all orders for this visit:    Financial problems  -     Care Coordination Referral; Future

## 2022-02-15 NOTE — TELEPHONE ENCOUNTER
RN called patient to relay Dr. Martin' message:    When I got this message, I recommended a virtual visit. It looks like that hasn't been scheduled yet. Please schedule.  I'll put in a [facility] referral, to help with financial.  No future appointments.   Diagnoses and all orders for this visit:    Financial problems  -     Care Coordination Referral; Future        Patient stated she wants to connect with  because of her anxiety. Recently, she was asked to work in the morning shift which makes her uncomfortable.      RN assisted patient to make an appointment   Appointments in Next    Feb 17, 2022 11:20 AM  (Arrive by 11:00 AM)  Provider Visit with Abril Martin MD  [facility] ([facility] ) [phone]          Jagruti Pinedo RN  [facility]

## 2022-02-15 NOTE — TELEPHONE ENCOUNTER
Please see telephone encounter for more information.      Jagruti Pinedo RN  [facility]

## 2022-02-17 ENCOUNTER — VIRTUAL VISIT (OUTPATIENT)
Dept: FAMILY MEDICINE | Facility: CLINIC | Age: 37
End: 2022-02-17
Payer: COMMERCIAL

## 2022-02-17 ENCOUNTER — TELEPHONE (OUTPATIENT)
Dept: FAMILY MEDICINE | Facility: CLINIC | Age: 37
End: 2022-02-17

## 2022-02-17 DIAGNOSIS — N18.1 CKD (CHRONIC KIDNEY DISEASE) STAGE 1, GFR 90 ML/MIN OR GREATER: ICD-10-CM

## 2022-02-17 DIAGNOSIS — E55.9 VITAMIN D DEFICIENCY: ICD-10-CM

## 2022-02-17 DIAGNOSIS — F33.42 MAJOR DEPRESSIVE DISORDER, RECURRENT, IN FULL REMISSION (H): ICD-10-CM

## 2022-02-17 DIAGNOSIS — D50.0 IRON DEFICIENCY ANEMIA DUE TO CHRONIC BLOOD LOSS: ICD-10-CM

## 2022-02-17 DIAGNOSIS — R74.8 LOW SERUM HIGH DENSITY LIPOPROTEIN (HDL): ICD-10-CM

## 2022-02-17 DIAGNOSIS — E11.9 TYPE 2 DIABETES MELLITUS WITHOUT COMPLICATION, WITHOUT LONG-TERM CURRENT USE OF INSULIN (H): ICD-10-CM

## 2022-02-17 DIAGNOSIS — F40.01 PANIC DISORDER WITH AGORAPHOBIA: Primary | ICD-10-CM

## 2022-02-17 DIAGNOSIS — B18.1 CHRONIC HEPATITIS B (H): ICD-10-CM

## 2022-02-17 DIAGNOSIS — F12.20 MARIJUANA DEPENDENCE (H): ICD-10-CM

## 2022-02-17 DIAGNOSIS — E66.01 MORBIDLY OBESE (H): ICD-10-CM

## 2022-02-17 DIAGNOSIS — Z31.69 INFERTILITY COUNSELING: ICD-10-CM

## 2022-02-17 PROCEDURE — 99214 OFFICE O/P EST MOD 30 MIN: CPT | Mod: GT | Performed by: FAMILY MEDICINE

## 2022-02-17 RX ORDER — METFORMIN HCL 500 MG
500 TABLET, EXTENDED RELEASE 24 HR ORAL 2 TIMES DAILY WITH MEALS
Qty: 60 TABLET | Refills: 1 | Status: SHIPPED | OUTPATIENT
Start: 2022-02-17 | End: 2022-07-27

## 2022-02-17 RX ORDER — BUSPIRONE HYDROCHLORIDE 5 MG/1
TABLET ORAL
Qty: 90 TABLET | Refills: 11 | Status: SHIPPED | OUTPATIENT
Start: 2022-02-17 | End: 2022-07-27

## 2022-02-17 NOTE — PROGRESS NOTES
Monica is a 36 year old who is being evaluated via a billable video visit.      How would you like to obtain your AVS? MyChart  If the video visit is dropped, the invitation should be resent by: Text to cell phone: [phone]  Will anyone else be joining your video visit? No     ____________________________    Virtual Visit - Video Encounter  [facility]  Family Medicine  Date of Service: 2/17/2022    Subjective:  Chief Complaint   Patient presents with     Diabetes     Anxiety      Metformin  Taking it every day - not helping with sugar.  Blood sugars are 150-200 and not feeling well.  Getting back to constipation.  Schedule - 3rd shift. Schedule is off since having Covid and being off work.    Work wants to switch her to 1st shift.   Having terrible anxiety - less anxiety at night. Much busier during the day.    PHQ 4/30/2021 2/8/2022   PHQ-9 Total Score 9 0   Q9: Thoughts of better off dead/self-harm past 2 weeks Not at all Not at all     Objective:     BMI Readings from Last 6 Encounters:   04/30/21 47.47 kg/m    11/08/19 46.03 kg/m    02/05/09 33.66 kg/m        GENERAL: Healthy, alert and no distress  EYES: Eyes grossly normal to inspection.  No discharge or erythema, or obvious scleral/conjunctival abnormalities.  RESP: No audible wheeze, cough, or visible cyanosis.  No visible retractions or increased work of breathing.    SKIN: Visible skin clear. No significant rash, abnormal pigmentation or lesions.  NEURO: Cranial nerves grossly intact.  Mentation and speech appropriate for age.  PSYCH: Mentation appears normal, affect normal/bright, judgement and insight intact, normal speech and appearance well-groomed.   No visits with results within 1 Week(s) from this visit.   Latest known visit with results is:   Lab on 01/27/2022   Component Date Value Ref Range Status     SARS CoV2 PCR 01/27/2022 Positive (A) Negative, Testing sent to reference lab. Results will be returned via unsolicited  result Final    POSITIVE: SARS-CoV-2 (COVID-19) RNA detected, presumed positive.     No results found.   Assessment & Plan:  1. Panic disorder with agoraphobia, history of depression in full remission. On chronic fluoxetine 40 mg. But symptoms are still significantly impacting life. Has made life modifications to accommodate her condition (working 3rd shift when things are quieter) and planning to quit her current job due to their plans to change her to 1st shift. Add buspirone 5 mg twice daily to decrease anxiety symptom. Re-establish care with therapy.  2. Diabetes with hyperglycemia. Last A1c ws 8.2%. Has diabetes education visit in two weeks. Due for labs.   3. Obesity - last BMI 47 worsening conditions of diabetes, gerd, endometrial hyperplasia, oligomenorrhea, Dyslipidemia, vitamin D deficiency.   4. Planning pregnancy - has been trying to get pregnant since 2011 with unprotected sex. Experiences oligomenorrhea. Referral made for fertility care.       Order Summary                                                      Monica was seen today for diabetes and anxiety.    Diagnoses and all orders for this visit:    Panic disorder with agoraphobia  -     Adult Mental Health  Referral; Future  -     busPIRone (BUSPAR) 5 MG tablet; Take 1 tablet (5 mg) by mouth 2 times daily for 14 days, THEN 2 tablets (10 mg) 2 times daily.    Type 2 diabetes mellitus without complication, without long-term current use of insulin (H)  -     metFORMIN (GLUCOPHAGE-XR) 500 MG 24 hr tablet; Take 1 tablet (500 mg) by mouth 2 times daily (with meals)    Marijuana dependence (H)    Major depressive disorder, recurrent, in full remission (H)    Morbidly obese (H)    Chronic hepatitis B (H)    Infertility counseling  -     Infertility/AI Referral; Future    CKD (chronic kidney disease) stage 1, GFR 90 ml/min or greater    Iron deficiency anemia due to chronic blood loss  -     Iron and iron binding capacity; Future    Low serum high  density lipoprotein (HDL)    Vitamin D deficiency        Future Appointments   Date Time Provider Department Center   2/17/2022  5:00 PM Abril Martin MD [facility]   2/28/2022  2:00 PM Soledad Benedict RD [facility]      Completed by: Abril Martin M.D., [facility]. 2/17/2022 2:06 PM.  This transcription uses voice recognition software, which may contain typographical errors.  ____________________________  Start visit: 2:06 PM  End visit: 2:37 PM     Video-Visit Details    Type of service:  Video Visit    Originating Location (pt. Location): Home    Distant Location (provider location):  [facility]     Platform used for Video Visit: [facility]

## 2022-02-17 NOTE — TELEPHONE ENCOUNTER
Writer replied to patient via eMoov message.    ANABELA SandhuN, RN   [facility]

## 2022-02-17 NOTE — PATIENT INSTRUCTIONS
Diabetes  Take metformin twice daily, with food.  Eat less carbs, more vegetables.  Walk 30 minutes, most days.  Recheck in 4 weeks.    Anxiety  Add buspirone twice a day.   --Start with 5 mg twice a day, increase to 10 mg twice a day after two weeks.  See therapist.

## 2022-02-17 NOTE — Clinical Note
Please schedule fasting lab-only appt prior to 2/28/22 (so diabetes educator can review labs on 2/28/22 visit).

## 2022-02-18 ENCOUNTER — PATIENT OUTREACH (OUTPATIENT)
Dept: CARE COORDINATION | Facility: CLINIC | Age: 37
End: 2022-02-18
Payer: COMMERCIAL

## 2022-02-18 PROBLEM — E11.9 DIABETES MELLITUS, TYPE 2 (H): Status: ACTIVE | Noted: 2021-04-30

## 2022-02-18 PROBLEM — N18.1 CKD (CHRONIC KIDNEY DISEASE) STAGE 1, GFR 90 ML/MIN OR GREATER: Status: ACTIVE | Noted: 2021-05-05

## 2022-02-18 NOTE — PROGRESS NOTES
2/18/2022  Clinic Care Coordination Contact  Community Health Worker Initial Outreach  UTC/Voicemail    Referral  Financial Support: Medication Affordability       Clinical Data: Care Coordinator Outreach: discuss enrollment  Outreach attempted x 2.  Left message on patient's voicemail with call back information and requested return call.  Plan: Care Coordinator will send care coordination introduction letter with care coordinator contact information and explanation of care coordination services via [email or web address].     Care Coordinator will do no further outreaches at this time.

## 2022-02-18 NOTE — PROGRESS NOTES
2/15/2022  Clinic Care Coordination Contact  Community Health Worker Initial Outreach  UTC/Voicemail    PCP referral  Financial and medication affordability    Clinical Data: Care Coordinator Outreach: Discuss CCC enrollment   Outreach attempted x 1.  Left message on patient's voicemail with call back information and requested return call.  Plan: Care Coordinator will try to reach patient again in 1-3 business days.    CHW Outreach: 2-18-22

## 2022-02-18 NOTE — LETTER
[facility] CARE COORDINATION  [address] [address]    February 18, 2022    Monica Doshi  [address]  [place] [address]      Dear Monica,      I am a clinic community health worker who works with Abril Martin MD at [facility].  I have been trying to reach you recently to introduce Clinic Care Coordination and to see if there was anything I could assist you with.  Below is a description of clinic care coordination and how I can further assist you.      The clinic care coordination team is made up of a registered nurse,  and community health worker who understand the health care system. The goal of clinic care coordination is to help you manage your health and improve access to the health care system in the most efficient manner. The team can assist you in meeting your health care goals by providing education, coordinating services, strengthening the communication among your providers and supporting you with any resource needs.    Please feel free to contact me at [phone] with any questions or concerns. We are focused on providing you with the highest-quality healthcare experience possible and that all starts with you.     Sincerely,     Kavon Hoskins  Community Health Worker  [facility]  Clinic Care Coordination  [email or web address]  [email or web address]   Office: [phone]  Fax: [phone]

## 2022-02-22 NOTE — PROGRESS NOTES
Left message #1 at [phone]. Postponing task out to a week and will try again. If patient returns call back, please help patient schedule an appointment per message below. Thanks!

## 2022-02-22 NOTE — TELEPHONE ENCOUNTER
Left message #1 at [phone]. Postponing task out to a week and will try again. If patient returns call back, please help patient schedule an appointment per message below. Thanks!

## 2022-02-28 ENCOUNTER — TELEPHONE (OUTPATIENT)
Dept: EDUCATION SERVICES | Facility: CLINIC | Age: 37
End: 2022-02-28

## 2022-02-28 NOTE — TELEPHONE ENCOUNTER
Tried calling the patient. Left them a voicemail and also the call back number. Please contact patient to reschedule.    Thanks  Soledad Benedict RDN, [facility], [facility]  Diabetes Care and Education

## 2022-03-01 NOTE — TELEPHONE ENCOUNTER
Left message #2 at [phone]. Sending letter out and postponing task out to 2 weeks and will try again if an appointment hasn't been made. If patient returns call back, please help patient schedule an appointment per message below. Thanks!

## 2022-03-12 ENCOUNTER — HEALTH MAINTENANCE LETTER (OUTPATIENT)
Age: 37
End: 2022-03-12

## 2022-03-22 DIAGNOSIS — E11.9 TYPE 2 DIABETES MELLITUS WITHOUT COMPLICATION, WITHOUT LONG-TERM CURRENT USE OF INSULIN (H): Primary | ICD-10-CM

## 2022-03-22 DIAGNOSIS — B18.1 CHRONIC HEPATITIS B (H): ICD-10-CM

## 2022-03-22 DIAGNOSIS — Z76.0 ENCOUNTER FOR MEDICATION REFILL: ICD-10-CM

## 2022-03-22 DIAGNOSIS — N18.1 CKD (CHRONIC KIDNEY DISEASE) STAGE 1, GFR 90 ML/MIN OR GREATER: ICD-10-CM

## 2022-03-22 DIAGNOSIS — R74.8 LOW SERUM HIGH DENSITY LIPOPROTEIN (HDL): ICD-10-CM

## 2022-03-22 DIAGNOSIS — D50.0 IRON DEFICIENCY ANEMIA DUE TO CHRONIC BLOOD LOSS: ICD-10-CM

## 2022-03-22 RX ORDER — BLOOD SUGAR DIAGNOSTIC
STRIP MISCELLANEOUS
COMMUNITY
Start: 2022-03-17 | End: 2024-08-21

## 2022-03-22 RX ORDER — TRAZODONE HYDROCHLORIDE 50 MG/1
50 TABLET, FILM COATED ORAL
COMMUNITY
Start: 2022-03-08 | End: 2022-07-27

## 2022-03-22 NOTE — TELEPHONE ENCOUNTER
Please assist Monica with scheduling a lab visit now and physical and diabetes education visit.  No future appointments.   Health Maintenance Due   Topic Date Due     MICROALBUMIN  Never done     DIABETIC FOOT EXAM  Never done     ASTHMA ACTION PLAN  Never done     DEPRESSION ACTION PLAN  Never done     EYE EXAM  Never done     URINALYSIS  Never done     Pneumococcal Vaccine: Pediatrics (0 to 5 Years) and At-Risk Patients (6 to 64 Years) (1 of 2 - PPSV23) Never done     PAP  Never done     DTAP/TDAP/TD IMMUNIZATION (2 - Td or Tdap) 09/17/2019     COVID-19 Vaccine (2 - Booster for Kristen series) 06/03/2021     A1C  07/30/2021     INFLUENZA VACCINE (1) 09/01/2021     BP Readings from Last 3 Encounters:   04/30/21 132/84   11/08/19 124/88   02/05/09 108/70    Monica was seen today for refill request.    Diagnoses and all orders for this visit:    Type 2 diabetes mellitus without complication, without long-term current use of insulin (H)  -     blood glucose (NO BRAND SPECIFIED) test strip; Use to test blood sugar ONCE daily.  -     Adult Eye Referral; Future  -     [facility] Adult Diabetes Educator Referral; Future  -     Hemoglobin A1c; Future  -     Cancel: Renal panel; Future  -     Albumin Random Urine Quantitative with Creat Ratio; Future  -     Vitamin D Deficiency; Future  -     TSH with free T4 reflex; Future    Encounter for medication refill  -     blood glucose (NO BRAND SPECIFIED) test strip; Use to test blood sugar ONCE daily.    Low serum high density lipoprotein (HDL)  -     Lipid panel reflex to direct LDL Fasting; Future    Iron deficiency anemia due to chronic blood loss  -     Iron and iron binding capacity; Future    Chronic hepatitis B (H)  -     AFP tumor marker; Future  -     Hep B Virus DNA Quant Real Time PCR; Future  -     US Abdomen Limited; Future  -     Comprehensive metabolic panel; Future    CKD (chronic kidney disease) stage 1, GFR 90 ml/min or greater  -     UA with Microscopic - lab collect;  Future

## 2022-03-22 NOTE — TELEPHONE ENCOUNTER
Medication refill queued and pended. Prescriber please review, sign, and send if appropriate. Thank you.

## 2022-03-22 NOTE — TELEPHONE ENCOUNTER
Incoming fax received from [facility] to refill Accu-Check Guide test strips. [facility] reviewed chart and all BS testing equipment appears discontinued.     Defer to prescriber.

## 2022-03-24 NOTE — TELEPHONE ENCOUNTER
Left message #1 at [phone]. Postponing task out to a week and will try again. If patient returns call back, please help patient schedule an appointment per message below. Thanks!

## 2022-04-04 NOTE — TELEPHONE ENCOUNTER
Left message #2 at [phone]. Sending letter out and postponing task out to 2 weeks and will try again if an appointment hasn't been made. If patient returns call back, please help patient schedule an appointment per message below. Thanks!

## 2022-04-20 DIAGNOSIS — G89.29 CHRONIC LOW BACK PAIN WITH SCIATICA, SCIATICA LATERALITY UNSPECIFIED, UNSPECIFIED BACK PAIN LATERALITY: Primary | ICD-10-CM

## 2022-04-20 DIAGNOSIS — Z91.09 ENVIRONMENTAL ALLERGIES: ICD-10-CM

## 2022-04-20 DIAGNOSIS — M54.40 CHRONIC LOW BACK PAIN WITH SCIATICA, SCIATICA LATERALITY UNSPECIFIED, UNSPECIFIED BACK PAIN LATERALITY: Primary | ICD-10-CM

## 2022-04-20 NOTE — TELEPHONE ENCOUNTER
Left message #3 at [phone]. If patient returns call back, please help patient schedule an appointment per message below. Thanks! We have made several attempts to contact patient by phone and letter to schedule an appointment. Unfortunately, our calls have not been returned and we were unable to schedule. At this time, we will no longer make an attempt to schedule this appointment. Completing task.

## 2022-04-23 RX ORDER — PSEUDOEPHED/ACETAMINOPH/DIPHEN 30MG-500MG
TABLET ORAL
Qty: 200 TABLET | Refills: 3 | Status: SHIPPED | OUTPATIENT
Start: 2022-04-23 | End: 2022-07-27

## 2022-04-23 RX ORDER — DIPHENHYDRAMINE HYDROCHLORIDE 25 MG/1
CAPSULE ORAL
Qty: 100 CAPSULE | Refills: 3 | Status: SHIPPED | OUTPATIENT
Start: 2022-04-23 | End: 2022-07-27

## 2022-04-23 NOTE — TELEPHONE ENCOUNTER
"Routing refill request to provider for review/approval because:  Drug not active on patient's medication list    Last Written Prescription Date:  ?  Last Fill Quantity: ?,  # refills: ?   Last office visit provider:  2/17/22     Requested Prescriptions   Pending Prescriptions Disp Refills     ACETAMINOPHEN EXTRA STRENGTH 500 MG tablet [Pharmacy Med Name: Acetaminophen Extra Strength Oral Tablet 500 MG] 200 tablet 0     Sig: TAKE TWO TABLETS BY MOUTH TWICE DAILY AS NEEDED FOR PAIN.       Analgesics (Non-Narcotic Tylenol and ASA Only) Failed - 4/20/2022 12:42 PM        Failed - Medication is active on med list        Passed - Recent (12 mo) or future (30 days) visit within the authorizing provider's specialty     Patient has had an office visit with the authorizing provider or a provider within the authorizing providers department within the previous 12 mos or has a future within next 30 days. See \"Patient Info\" tab in inbasket, or \"Choose Columns\" in Meds & Orders section of the refill encounter.              Passed - Patient is 7 months old or older     If patient is a peds patient of the age 7 mos -12 years, ok to refill using weight-based dosing.     If >3g daily and/or sig is not \"prn\", check for liver enzymes. If normal in the last year, ok to refill.  If not, refer to the provider.             BANOPHEN 25 MG capsule [Pharmacy Med Name: Banophen Oral Capsule 25 MG] 200 capsule 0     Sig: TAKE ONE CAPSULE BY MOUTH EVERY SIX HOURS AS NEEDED FOR ITCHING       Antihistamines Protocol Failed - 4/20/2022 12:42 PM        Failed - Medication is active on med list        Passed - Recent (12 mo) or future (30 days) visit within the authorizing provider's specialty     Patient has had an office visit with the authorizing provider or a provider within the authorizing providers department within the previous 12 mos or has a future within next 30 days. See \"Patient Info\" tab in inbasket, or \"Choose Columns\" in Meds & Orders " section of the refill encounter.              Passed - Patient is age 3 or older     Apply age and/or weight-based dosing for peds patients age 3 and older.    Forward request to provider for patients under the age of 3.               Lisa Torres 04/23/22 6:27 PM

## 2022-07-02 ENCOUNTER — HEALTH MAINTENANCE LETTER (OUTPATIENT)
Age: 37
End: 2022-07-02

## 2022-07-26 ENCOUNTER — MYC MEDICAL ADVICE (OUTPATIENT)
Dept: FAMILY MEDICINE | Facility: CLINIC | Age: 37
End: 2022-07-26

## 2022-07-26 DIAGNOSIS — K21.9 GASTROESOPHAGEAL REFLUX DISEASE, UNSPECIFIED WHETHER ESOPHAGITIS PRESENT: ICD-10-CM

## 2022-07-26 DIAGNOSIS — E11.9 TYPE 2 DIABETES MELLITUS WITHOUT COMPLICATION, WITHOUT LONG-TERM CURRENT USE OF INSULIN (H): ICD-10-CM

## 2022-07-26 DIAGNOSIS — E11.69 TYPE 2 DIABETES MELLITUS WITH OTHER SPECIFIED COMPLICATION, WITHOUT LONG-TERM CURRENT USE OF INSULIN (H): ICD-10-CM

## 2022-07-26 DIAGNOSIS — F33.2 SEVERE EPISODE OF RECURRENT MAJOR DEPRESSIVE DISORDER, WITHOUT PSYCHOTIC FEATURES (H): Primary | ICD-10-CM

## 2022-07-26 DIAGNOSIS — M54.40 CHRONIC LOW BACK PAIN WITH SCIATICA, SCIATICA LATERALITY UNSPECIFIED, UNSPECIFIED BACK PAIN LATERALITY: ICD-10-CM

## 2022-07-26 DIAGNOSIS — E55.9 VITAMIN D DEFICIENCY: ICD-10-CM

## 2022-07-26 DIAGNOSIS — T78.40XA ALLERGY, INITIAL ENCOUNTER: ICD-10-CM

## 2022-07-26 DIAGNOSIS — G89.29 CHRONIC LOW BACK PAIN WITH SCIATICA, SCIATICA LATERALITY UNSPECIFIED, UNSPECIFIED BACK PAIN LATERALITY: ICD-10-CM

## 2022-07-26 DIAGNOSIS — F33.42 MAJOR DEPRESSIVE DISORDER, RECURRENT, IN FULL REMISSION (H): ICD-10-CM

## 2022-07-26 DIAGNOSIS — D50.0 IRON DEFICIENCY ANEMIA DUE TO CHRONIC BLOOD LOSS: Primary | ICD-10-CM

## 2022-07-27 RX ORDER — ASCORBIC ACID 500 MG
500 TABLET ORAL 2 TIMES DAILY
Qty: 200 TABLET | Refills: 3 | Status: SHIPPED | OUTPATIENT
Start: 2022-07-27 | End: 2022-10-07

## 2022-07-27 RX ORDER — FLUOXETINE 40 MG/1
40 CAPSULE ORAL DAILY
Qty: 90 CAPSULE | Refills: 3 | Status: SHIPPED | OUTPATIENT
Start: 2022-07-27 | End: 2022-10-07

## 2022-07-27 RX ORDER — CETIRIZINE HYDROCHLORIDE 10 MG/1
10 TABLET ORAL DAILY
Qty: 90 TABLET | Refills: 2 | Status: SHIPPED | OUTPATIENT
Start: 2022-07-27 | End: 2024-05-10

## 2022-07-27 RX ORDER — FAMOTIDINE 20 MG/1
20 TABLET, FILM COATED ORAL 2 TIMES DAILY
Qty: 180 TABLET | Refills: 2 | Status: SHIPPED | OUTPATIENT
Start: 2022-07-27 | End: 2023-09-06

## 2022-07-27 RX ORDER — FLUOXETINE 40 MG/1
CAPSULE ORAL
Qty: 90 CAPSULE | Refills: 2 | Status: SHIPPED | OUTPATIENT
Start: 2022-07-27 | End: 2022-10-07

## 2022-07-27 RX ORDER — CETIRIZINE HYDROCHLORIDE 10 MG/1
TABLET ORAL
Qty: 90 TABLET | Refills: 2 | Status: SHIPPED | OUTPATIENT
Start: 2022-07-27 | End: 2022-07-27

## 2022-07-27 RX ORDER — METFORMIN HCL 500 MG
TABLET, EXTENDED RELEASE 24 HR ORAL
Qty: 180 TABLET | Refills: 0 | Status: SHIPPED | OUTPATIENT
Start: 2022-07-27 | End: 2022-07-29

## 2022-07-27 RX ORDER — TRAZODONE HYDROCHLORIDE 50 MG/1
TABLET, FILM COATED ORAL
Qty: 90 TABLET | Refills: 2 | Status: SHIPPED | OUTPATIENT
Start: 2022-07-27 | End: 2022-10-07

## 2022-07-27 RX ORDER — PSEUDOEPHED/ACETAMINOPH/DIPHEN 30MG-500MG
TABLET ORAL
Qty: 360 TABLET | Refills: 2 | Status: SHIPPED | OUTPATIENT
Start: 2022-07-27 | End: 2023-09-06

## 2022-07-27 RX ORDER — FAMOTIDINE 20 MG/1
TABLET, FILM COATED ORAL
Qty: 180 TABLET | Refills: 2 | Status: SHIPPED | OUTPATIENT
Start: 2022-07-27 | End: 2022-07-27

## 2022-07-27 NOTE — TELEPHONE ENCOUNTER
Due for lab and nurse visit (BP + shots) now and physical in in six weeks. Please schedule.  OK to do asthma control test over the phone and send it to me.    No future appointments.   Health Maintenance Due   Topic Date Due     MICROALBUMIN  Never done     DIABETIC FOOT EXAM  Never done     ASTHMA ACTION PLAN  Never done     DEPRESSION ACTION PLAN  Never done     EYE EXAM  Never done     URINALYSIS  Never done     Pneumococcal Vaccine: Pediatrics (0 to 5 Years) and At-Risk Patients (6 to 64 Years) (1 - PCV) Never done     PAP  Never done     DTAP/TDAP/TD IMMUNIZATION (2 - Td or Tdap) 09/17/2019     COVID-19 Vaccine (2 - Booster for Kristen series) 06/03/2021     A1C  07/30/2021     PREVENTIVE CARE VISIT  04/30/2022     BMP  04/30/2022     LIPID  04/30/2022     ASTHMA CONTROL TEST  08/08/2022     PHQ-9  08/08/2022     BP Readings from Last 3 Encounters:   04/30/21 132/84   11/08/19 124/88   02/05/09 108/70     Diagnoses and all orders for this visit:    Iron deficiency anemia due to chronic blood loss  -     vitamin C (ASCORBIC ACID) 500 MG tablet; Take 1 tablet (500 mg) by mouth 2 times daily    Allergy, initial encounter  -     cetirizine (ZYRTEC) 10 MG tablet; Take 1 tablet (10 mg) by mouth daily    Gastroesophageal reflux disease, unspecified whether esophagitis present  -     famotidine (PEPCID) 20 MG tablet; Take 1 tablet (20 mg) by mouth 2 times daily    Major depressive disorder, recurrent, in full remission (H)  -     FLUoxetine (PROZAC) 40 MG capsule; Take 1 capsule (40 mg) by mouth daily    Type 2 diabetes mellitus with other specified complication, without long-term current use of insulin (H)  -     metFORMIN (GLUCOPHAGE) 500 MG tablet; Take 1 tablet (500 mg) by mouth 2 times daily (with meals)  -     TOBACCO CESSATION ORDER FOR   -     Adult Eye Referral; Future  -     Hemoglobin A1c; Future  -     Comprehensive metabolic panel; Future  -     Lipid Profile; Future  -     Albumin Random Urine  Quantitative with Creat Ratio; Future  -     TSH with free T4 reflex; Future  -     Pneumococcal 20 Valent Conjugate (Prevnar 20); Future    Vitamin D deficiency  -     Vitamin D Deficiency; Future    Other orders  -     REVIEW OF HEALTH MAINTENANCE PROTOCOL ORDERS

## 2022-07-27 NOTE — TELEPHONE ENCOUNTER
"Routing refill request to provider for review/approval because:  Drug interaction warning  Labs not current:  A1C    Last Written Prescription Date:  2/17/22  Last Fill Quantity: 60,  # refills: 1   Last office visit provider:  2/17/22     Requested Prescriptions   Pending Prescriptions Disp Refills     metFORMIN (GLUCOPHAGE XR) 500 MG 24 hr tablet [Pharmacy Med Name: metFORMIN HCl ER Oral Tablet Extended Release 24 Hour 500 MG] 60 tablet 0     Sig: Take 1 tablet by mouth 2 times daily with meals       Biguanide Agents Failed - 7/26/2022  6:39 PM        Failed - Patient has documented A1c within the specified period of time.     If HgbA1C is 8 or greater, it needs to be on file within the past 3 months.  If less than 8, must be on file within the past 6 months.     Recent Labs   Lab Test 04/30/21  1013   A1C 8.2*             Failed - Patient's CR is NOT>1.4 OR Patient's EGFR is NOT<45 within past 12 mos.     Recent Labs   Lab Test 04/30/21  1013   GFRESTIMATED >60   GFRESTBLACK >60       Recent Labs   Lab Test 04/30/21  1013   CR 0.88             Passed - Patient is age 10 or older        Passed - Patient does NOT have a diagnosis of CHF.        Passed - Medication is active on med list        Passed - Patient is not pregnant        Passed - Patient has not had a positive pregnancy test within the past 12 mos.         Passed - Recent (6 mo) or future (30 days) visit within the authorizing provider's specialty     Patient had office visit in the last 6 months or has a visit in the next 30 days with authorizing provider or within the authorizing provider's specialty.  See \"Patient Info\" tab in inbasket, or \"Choose Columns\" in Meds & Orders section of the refill encounter.               traZODone (DESYREL) 50 MG tablet [Pharmacy Med Name: traZODone HCl Oral Tablet 50 MG] 90 tablet 2     Sig: TAKE ONE TABLET BY MOUTH AT BEDTIME AS NEEDED       Serotonin Modulators Passed - 7/26/2022  6:39 PM        Passed - Recent (12 " "mo) or future (30 days) visit within the authorizing provider's specialty     Patient has had an office visit with the authorizing provider or a provider within the authorizing providers department within the previous 12 mos or has a future within next 30 days. See \"Patient Info\" tab in inbasket, or \"Choose Columns\" in Meds & Orders section of the refill encounter.              Passed - Medication is active on med list        Passed - Patient is age 18 or older        Passed - No active pregnancy on record        Passed - No positive pregnancy test in past 12 months           FLUoxetine (PROZAC) 40 MG capsule [Pharmacy Med Name: FLUoxetine HCl Oral Capsule 40 MG] 90 capsule 2     Sig: TAKE ONE CAPSULE BY MOUTH ONE TIME DAILY       SSRIs Protocol Passed - 7/26/2022  6:39 PM        Passed - Recent (12 mo) or future (30 days) visit within the authorizing provider's specialty     Patient has had an office visit with the authorizing provider or a provider within the authorizing providers department within the previous 12 mos or has a future within next 30 days. See \"Patient Info\" tab in [employer], or \"Choose Columns\" in Meds & Orders section of the refill encounter.              Passed - Medication is active on med list        Passed - Patient is age 18 or older        Passed - No active pregnancy on record        Passed - No positive pregnancy test in last 12 months         Signed Prescriptions Disp Refills    cetirizine (ZYRTEC) 10 MG tablet 90 tablet 2     Sig: Take 1 tablet (10 mg total) by mouth daily.       Antihistamines Protocol Failed - 7/26/2022  6:39 PM        Failed - Medication is active on med list        Passed - Patient is 3-64 years of age     Apply weight-based dosing for peds patients age 3 - 12 years of age.    Forward request to provider for patients under the age of 3 or over the age of 64.          Passed - Recent (12 mo) or future (30 days) visit within the authorizing provider's specialty     " "Patient has had an office visit with the authorizing provider or a provider within the authorizing providers department within the previous 12 mos or has a future within next 30 days. See \"Patient Info\" tab in inbasket, or \"Choose Columns\" in Meds & Orders section of the refill encounter.                famotidine (PEPCID) 20 MG tablet 180 tablet 2     Sig: Take 1 tablet (20 mg total) by mouth 2 (two) times a day.       H2 Blockers Protocol Failed - 7/26/2022  6:39 PM        Failed - Medication is active on med list        Passed - Patient is age 12 or older        Passed - Recent (12 mo) or future (30 days) visit within the authorizing provider's specialty     Patient has had an office visit with the authorizing provider or a provider within the authorizing providers department within the previous 12 mos or has a future within next 30 days. See \"Patient Info\" tab in inbasket, or \"Choose Columns\" in Meds & Orders section of the refill encounter.                ACETAMINOPHEN EXTRA STRENGTH 500 MG tablet 360 tablet 2     Sig: TAKE TWO TABLETS BY MOUTH TWICE DAILY AS NEEDED FOR PAIN.       Analgesics (Non-Narcotic Tylenol and ASA Only) Passed - 7/26/2022  6:39 PM        Passed - Recent (12 mo) or future (30 days) visit within the authorizing provider's specialty     Patient has had an office visit with the authorizing provider or a provider within the authorizing providers department within the previous 12 mos or has a future within next 30 days. See \"Patient Info\" tab in inbasket, or \"Choose Columns\" in Meds & Orders section of the refill encounter.              Passed - Patient is 7 months old or older     If patient is a peds patient of the age 7 mos -12 years, ok to refill using weight-based dosing.     If >3g daily and/or sig is not \"prn\", check for liver enzymes. If normal in the last year, ok to refill.  If not, refer to the provider.          Passed - Medication is active on med list             Steve Dobbs, " RN 07/27/22 7:16 AM

## 2022-07-27 NOTE — TELEPHONE ENCOUNTER
Outpatient Medication Detail     Disp Refills Start End MORGAN   cetirizine (ZYRTEC) 10 MG tablet 90 tablet 1 5/27/2021  No   Sig - Route: Take 1 tablet (10 mg total) by mouth daily. - Oral   Sent to pharmacy as: cetirizine 10 mg tablet (ZyrTEC)   E-Prescribing Status: Receipt confirmed by pharmacy (5/27/2021 12:12 PM CDT)     Outpatient Medication Detail     Disp Refills Start End MORGAN   famotidine (PEPCID) 20 MG tablet 180 tablet 3 5/27/2021  --   Sig - Route: Take 1 tablet (20 mg total) by mouth 2 (two) times a day. - Oral   Sent to pharmacy as: famotidine 20 mg tablet (PEPCID)   E-Prescribing Status: Receipt confirmed by pharmacy (5/27/2021 12:08 PM CDT)       Outpatient Medication Detail     Disp Refills Start End MORGAN   traZODone (DESYREL) 50 MG tablet 90 tablet 4 4/30/2021  No   Sig - Route: Take 1 tablet (50 mg total) by mouth at bedtime as needed. - Oral   Sent to pharmacy as: traZODone 50 mg tablet (DESYREL)   E-Prescribing Status: Receipt confirmed by pharmacy (4/30/2021  9:39 AM CDT)       Outpatient Medication Detail     Disp Refills Start End MORGAN   FLUoxetine (PROZAC) 40 MG capsule 90 capsule 4 4/30/2021  No   Sig - Route: Take 1 capsule (40 mg total) by mouth daily. - Oral   Sent to pharmacy as: FLUoxetine 40 mg capsule (PROzac)   E-Prescribing Status: Receipt confirmed by pharmacy (4/30/2021  9:39 AM CDT)     Last Written Prescription Date:  4/23/22  Last Fill Quantity: 200,  # refills: 3   Last office visit provider:  2/17/22     Requested Prescriptions   Pending Prescriptions Disp Refills     cetirizine (ZYRTEC) 10 MG tablet [Pharmacy Med Name: Cetirizine HCl Oral Tablet 10 MG] 90 tablet 0     Sig: Take 1 tablet (10 mg total) by mouth daily.       Antihistamines Protocol Failed - 7/26/2022  6:39 PM        Failed - Medication is active on med list        Passed - Patient is 3-64 years of age     Apply weight-based dosing for peds patients age 3 - 12 years of age.    Forward request to provider for  "patients under the age of 3 or over the age of 64.          Passed - Recent (12 mo) or future (30 days) visit within the authorizing provider's specialty     Patient has had an office visit with the authorizing provider or a provider within the authorizing providers department within the previous 12 mos or has a future within next 30 days. See \"Patient Info\" tab in inbasket, or \"Choose Columns\" in Meds & Orders section of the refill encounter.                 famotidine (PEPCID) 20 MG tablet [Pharmacy Med Name: Famotidine Oral Tablet 20 MG] 180 tablet 0     Sig: Take 1 tablet (20 mg total) by mouth 2 (two) times a day.       H2 Blockers Protocol Failed - 7/26/2022  6:39 PM        Failed - Medication is active on med list        Passed - Patient is age 12 or older        Passed - Recent (12 mo) or future (30 days) visit within the authorizing provider's specialty     Patient has had an office visit with the authorizing provider or a provider within the authorizing providers department within the previous 12 mos or has a future within next 30 days. See \"Patient Info\" tab in inbasket, or \"Choose Columns\" in Meds & Orders section of the refill encounter.                 metFORMIN (GLUCOPHAGE XR) 500 MG 24 hr tablet [Pharmacy Med Name: metFORMIN HCl ER Oral Tablet Extended Release 24 Hour 500 MG] 60 tablet 0     Sig: Take 1 tablet by mouth 2 times daily with meals       Biguanide Agents Failed - 7/26/2022  6:39 PM        Failed - Patient has documented A1c within the specified period of time.     If HgbA1C is 8 or greater, it needs to be on file within the past 3 months.  If less than 8, must be on file within the past 6 months.     Recent Labs   Lab Test 04/30/21  1013   A1C 8.2*             Failed - Patient's CR is NOT>1.4 OR Patient's EGFR is NOT<45 within past 12 mos.     Recent Labs   Lab Test 04/30/21  1013   GFRESTIMATED >60   GFRESTBLACK >60       Recent Labs   Lab Test 04/30/21  1013   CR 0.88             Passed " "- Patient is age 10 or older        Passed - Patient does NOT have a diagnosis of CHF.        Passed - Medication is active on med list        Passed - Patient is not pregnant        Passed - Patient has not had a positive pregnancy test within the past 12 mos.         Passed - Recent (6 mo) or future (30 days) visit within the authorizing provider's specialty     Patient had office visit in the last 6 months or has a visit in the next 30 days with authorizing provider or within the authorizing provider's specialty.  See \"Patient Info\" tab in inbasket, or \"Choose Columns\" in Meds & Orders section of the refill encounter.               traZODone (DESYREL) 50 MG tablet [Pharmacy Med Name: traZODone HCl Oral Tablet 50 MG] 90 tablet 0     Sig: TAKE ONE TABLET BY MOUTH AT BEDTIME AS NEEDED       Serotonin Modulators Passed - 7/26/2022  6:39 PM        Passed - Recent (12 mo) or future (30 days) visit within the authorizing provider's specialty     Patient has had an office visit with the authorizing provider or a provider within the authorizing providers department within the previous 12 mos or has a future within next 30 days. See \"Patient Info\" tab in inbasket, or \"Choose Columns\" in Meds & Orders section of the refill encounter.              Passed - Medication is active on med list        Passed - Patient is age 18 or older        Passed - No active pregnancy on record        Passed - No positive pregnancy test in past 12 months           FLUoxetine (PROZAC) 40 MG capsule [Pharmacy Med Name: FLUoxetine HCl Oral Capsule 40 MG] 90 capsule 0     Sig: TAKE ONE CAPSULE BY MOUTH ONE TIME DAILY       SSRIs Protocol Passed - 7/26/2022  6:39 PM        Passed - Recent (12 mo) or future (30 days) visit within the authorizing provider's specialty     Patient has had an office visit with the authorizing provider or a provider within the authorizing providers department within the previous 12 mos or has a future within next 30 " "days. See \"Patient Info\" tab in inbasket, or \"Choose Columns\" in Meds & Orders section of the refill encounter.              Passed - Medication is active on med list        Passed - Patient is age 18 or older        Passed - No active pregnancy on record        Passed - No positive pregnancy test in last 12 months           ACETAMINOPHEN EXTRA STRENGTH 500 MG tablet [Pharmacy Med Name: Acetaminophen Extra Strength Oral Tablet 500 MG] 200 tablet 0     Sig: TAKE TWO TABLETS BY MOUTH TWICE DAILY AS NEEDED FOR PAIN.       Analgesics (Non-Narcotic Tylenol and ASA Only) Passed - 7/26/2022  6:39 PM        Passed - Recent (12 mo) or future (30 days) visit within the authorizing provider's specialty     Patient has had an office visit with the authorizing provider or a provider within the authorizing providers department within the previous 12 mos or has a future within next 30 days. See \"Patient Info\" tab in inbasket, or \"Choose Columns\" in Meds & Orders section of the refill encounter.              Passed - Patient is 7 months old or older     If patient is a peds patient of the age 7 mos -12 years, ok to refill using weight-based dosing.     If >3g daily and/or sig is not \"prn\", check for liver enzymes. If normal in the last year, ok to refill.  If not, refer to the provider.          Passed - Medication is active on med list             Steve Dobbs RN 07/27/22 7:05 AM  "

## 2022-07-27 NOTE — TELEPHONE ENCOUNTER
Tasha from [facility] called to get a clarification regarding which metformin to give to pt because there is two metformin refill. Please fax the correct metformin to pharmacy.

## 2022-07-27 NOTE — TELEPHONE ENCOUNTER
Left message #1 for patient to return call to clinic to schedule and complete ACT. Okay to transfer for completion of ACT on return call and schedule

## 2022-07-28 ENCOUNTER — LAB (OUTPATIENT)
Dept: FAMILY MEDICINE | Facility: CLINIC | Age: 37
End: 2022-07-28
Payer: COMMERCIAL

## 2022-07-28 DIAGNOSIS — Z20.822 SUSPECTED COVID-19 VIRUS INFECTION: ICD-10-CM

## 2022-07-28 PROCEDURE — U0003 INFECTIOUS AGENT DETECTION BY NUCLEIC ACID (DNA OR RNA); SEVERE ACUTE RESPIRATORY SYNDROME CORONAVIRUS 2 (SARS-COV-2) (CORONAVIRUS DISEASE [COVID-19]), AMPLIFIED PROBE TECHNIQUE, MAKING USE OF HIGH THROUGHPUT TECHNOLOGIES AS DESCRIBED BY CMS-2020-01-R: HCPCS

## 2022-07-28 PROCEDURE — U0005 INFEC AGEN DETEC AMPLI PROBE: HCPCS

## 2022-07-29 ENCOUNTER — TELEPHONE (OUTPATIENT)
Dept: FAMILY MEDICINE | Facility: CLINIC | Age: 37
End: 2022-07-29

## 2022-07-29 DIAGNOSIS — E11.9 TYPE 2 DIABETES MELLITUS WITHOUT COMPLICATION, WITHOUT LONG-TERM CURRENT USE OF INSULIN (H): ICD-10-CM

## 2022-07-29 LAB — SARS-COV-2 RNA RESP QL NAA+PROBE: NEGATIVE

## 2022-07-29 RX ORDER — METFORMIN HCL 500 MG
500 TABLET, EXTENDED RELEASE 24 HR ORAL
Qty: 90 TABLET | Refills: 0 | Status: SHIPPED | OUTPATIENT
Start: 2022-07-29 | End: 2022-10-07

## 2022-07-29 NOTE — TELEPHONE ENCOUNTER
Reason for Call:  Medication or medication refill:    Do you use a [facility]?  Name of the pharmacy and phone number for the current request:  [facility] on [facility]    Name of the medication requested:   metFORMIN (GLUCOPHAGE XR) 500 MG 24 hr tablet  metFORMIN (GLUCOPHAGE) 500 MG tablet    Other request: Pharmacy called to get clarification on this medication. There is a RX for two. Does provider want patient to take regular or XR? Please call pharmacy to clarify.    Can we leave a detailed message on this number? YES    Phone number patient can be reached at: Home number on file [phone] (home)    Best Time: any    Call taken on 7/29/2022 at 12:20 PM by Thor Damon

## 2022-07-29 NOTE — TELEPHONE ENCOUNTER
RN routing to PCP to review med clarification question below.    ANABELA SandhuN, RN   [facility]

## 2022-07-29 NOTE — TELEPHONE ENCOUNTER
I looked over her chart. It looks like she was having stomach discomfort with metformin.  I'll have her take the Metformin  mg once daily for now.    She is due for a diabetes check. Last labs 4/30/21.  I tried to get her in for follow up on 3/22 and 7/27 and she isn't scheduled for a visit yet (see the 7/27/22 note).  She also missed her diabetes education visit.     No future appointments.

## 2022-07-29 NOTE — TELEPHONE ENCOUNTER
Contacted patient with message below from Dr Martin.  Scheduled first available appointment with Dr Martin on 10/7/22.  Patient does not want to schedule an appointment with DM education.    Zoila Fong RN  [facility]       Abril Martin MD  I looked over her chart. It looks like she was having stomach discomfort with metformin.  I'll have her take the Metformin  mg once daily for now.    She is due for a diabetes check. Last labs 4/30/21.  I tried to get her in for follow up on 3/22 and 7/27 and she isn't scheduled for a visit yet (see the 7/27/22 note).  She also missed her diabetes education visit.     No future appointments.

## 2022-08-17 NOTE — LETTER
Letter by Abril Martin MD at      Author: Abril Martin MD Service: -- Author Type: --    Filed:  Encounter Date: 5/5/2021 Status: (Other)         Monica Doshi  [address] [place] [address]      May 5, 2021      Dear Monica,    As a valued [facility] patient, your healthcare needs are our priority.  Your health care team has determined that you are due for an appointment regarding your Diabetes and lab follow up.    To help prevent delays in your care, please call the [facility] at [phone].    We look forward to partnering with you to achieve optimal health and wellbeing.    Sincerely,  Your care team at [facility] or [facility]         
Click to add…

## 2022-10-07 ENCOUNTER — OFFICE VISIT (OUTPATIENT)
Dept: FAMILY MEDICINE | Facility: CLINIC | Age: 37
End: 2022-10-07
Payer: COMMERCIAL

## 2022-10-07 VITALS
SYSTOLIC BLOOD PRESSURE: 128 MMHG | BODY MASS INDEX: 44.99 KG/M2 | OXYGEN SATURATION: 95 % | RESPIRATION RATE: 18 BRPM | DIASTOLIC BLOOD PRESSURE: 89 MMHG | WEIGHT: 254 LBS | HEART RATE: 78 BPM

## 2022-10-07 DIAGNOSIS — N93.9 ABNORMAL UTERINE BLEEDING: ICD-10-CM

## 2022-10-07 DIAGNOSIS — E11.9 TYPE 2 DIABETES MELLITUS WITHOUT COMPLICATION, WITHOUT LONG-TERM CURRENT USE OF INSULIN (H): Primary | ICD-10-CM

## 2022-10-07 DIAGNOSIS — R74.8 LOW SERUM HIGH DENSITY LIPOPROTEIN (HDL): ICD-10-CM

## 2022-10-07 DIAGNOSIS — F33.42 MAJOR DEPRESSIVE DISORDER, RECURRENT, IN FULL REMISSION (H): ICD-10-CM

## 2022-10-07 DIAGNOSIS — N94.10 DYSPAREUNIA IN FEMALE: ICD-10-CM

## 2022-10-07 DIAGNOSIS — E61.1 IRON DEFICIENCY: ICD-10-CM

## 2022-10-07 DIAGNOSIS — N85.01 ENDOMETRIAL HYPERPLASIA, COMPLEX: ICD-10-CM

## 2022-10-07 DIAGNOSIS — N91.4 SECONDARY OLIGOMENORRHEA: ICD-10-CM

## 2022-10-07 DIAGNOSIS — E55.9 VITAMIN D DEFICIENCY: ICD-10-CM

## 2022-10-07 DIAGNOSIS — N18.1 CKD (CHRONIC KIDNEY DISEASE) STAGE 1, GFR 90 ML/MIN OR GREATER: ICD-10-CM

## 2022-10-07 DIAGNOSIS — K59.04 FUNCTIONAL CONSTIPATION: ICD-10-CM

## 2022-10-07 DIAGNOSIS — B18.1 CHRONIC HEPATITIS B (H): ICD-10-CM

## 2022-10-07 DIAGNOSIS — D50.0 IRON DEFICIENCY ANEMIA DUE TO CHRONIC BLOOD LOSS: ICD-10-CM

## 2022-10-07 DIAGNOSIS — N92.4 EXCESSIVE BLEEDING IN PREMENOPAUSAL PERIOD: ICD-10-CM

## 2022-10-07 PROBLEM — Z31.69 INFERTILITY COUNSELING: Status: RESOLVED | Noted: 2021-05-05 | Resolved: 2022-10-07

## 2022-10-07 LAB
AFP SERPL-MCNC: 1.8 NG/ML
ALBUMIN SERPL BCG-MCNC: 4.4 G/DL (ref 3.5–5.2)
ALBUMIN UR-MCNC: 30 MG/DL
ALP SERPL-CCNC: 64 U/L (ref 35–104)
ALT SERPL W P-5'-P-CCNC: 25 U/L (ref 10–35)
ANION GAP SERPL CALCULATED.3IONS-SCNC: 11 MMOL/L (ref 7–15)
APPEARANCE UR: CLEAR
AST SERPL W P-5'-P-CCNC: 20 U/L (ref 10–35)
BACTERIA #/AREA URNS HPF: ABNORMAL /HPF
BILIRUB SERPL-MCNC: 0.3 MG/DL
BILIRUB UR QL STRIP: NEGATIVE
BUN SERPL-MCNC: 17.4 MG/DL (ref 6–20)
CALCIUM SERPL-MCNC: 9.5 MG/DL (ref 8.6–10)
CHLORIDE SERPL-SCNC: 102 MMOL/L (ref 98–107)
CHOLEST SERPL-MCNC: 182 MG/DL
COLOR UR AUTO: YELLOW
CREAT SERPL-MCNC: 0.58 MG/DL (ref 0.51–0.95)
CREAT UR-MCNC: 173 MG/DL
DEPRECATED HCO3 PLAS-SCNC: 24 MMOL/L (ref 22–29)
ERYTHROCYTE [DISTWIDTH] IN BLOOD BY AUTOMATED COUNT: 17.9 % (ref 10–15)
GFR SERPL CREATININE-BSD FRML MDRD: >90 ML/MIN/1.73M2
GLUCOSE SERPL-MCNC: 108 MG/DL (ref 70–99)
GLUCOSE UR STRIP-MCNC: NEGATIVE MG/DL
HBA1C MFR BLD: 6.4 % (ref 0–5.6)
HCT VFR BLD AUTO: 40.2 % (ref 35–47)
HDLC SERPL-MCNC: 41 MG/DL
HGB BLD-MCNC: 12.5 G/DL (ref 11.7–15.7)
HGB UR QL STRIP: ABNORMAL
IRON BINDING CAPACITY (ROCHE): 380 UG/DL (ref 240–430)
IRON SATN MFR SERPL: 7 % (ref 15–46)
IRON SERPL-MCNC: 26 UG/DL (ref 37–145)
KETONES UR STRIP-MCNC: NEGATIVE MG/DL
LDLC SERPL CALC-MCNC: 105 MG/DL
LEUKOCYTE ESTERASE UR QL STRIP: ABNORMAL
MCH RBC QN AUTO: 22.4 PG (ref 26.5–33)
MCHC RBC AUTO-ENTMCNC: 31.1 G/DL (ref 31.5–36.5)
MCV RBC AUTO: 72 FL (ref 78–100)
MICROALBUMIN UR-MCNC: 106 MG/L
MICROALBUMIN/CREAT UR: 61.27 MG/G CR (ref 0–25)
NITRATE UR QL: NEGATIVE
NONHDLC SERPL-MCNC: 141 MG/DL
PH UR STRIP: 7 [PH] (ref 5–8)
PLATELET # BLD AUTO: 320 10E3/UL (ref 150–450)
POTASSIUM SERPL-SCNC: 4.3 MMOL/L (ref 3.4–5.3)
PROT SERPL-MCNC: 7.4 G/DL (ref 6.4–8.3)
RBC # BLD AUTO: 5.58 10E6/UL (ref 3.8–5.2)
RBC #/AREA URNS AUTO: ABNORMAL /HPF
SODIUM SERPL-SCNC: 137 MMOL/L (ref 136–145)
SP GR UR STRIP: 1.02 (ref 1–1.03)
SQUAMOUS #/AREA URNS AUTO: ABNORMAL /LPF
TRIGL SERPL-MCNC: 178 MG/DL
TSH SERPL DL<=0.005 MIU/L-ACNC: 3.2 UIU/ML (ref 0.3–4.2)
UROBILINOGEN UR STRIP-ACNC: 1 E.U./DL
WBC # BLD AUTO: 10.1 10E3/UL (ref 4–11)
WBC #/AREA URNS AUTO: ABNORMAL /HPF

## 2022-10-07 PROCEDURE — 82043 UR ALBUMIN QUANTITATIVE: CPT | Performed by: FAMILY MEDICINE

## 2022-10-07 PROCEDURE — 90686 IIV4 VACC NO PRSV 0.5 ML IM: CPT | Performed by: FAMILY MEDICINE

## 2022-10-07 PROCEDURE — 80053 COMPREHEN METABOLIC PANEL: CPT | Performed by: FAMILY MEDICINE

## 2022-10-07 PROCEDURE — 36415 COLL VENOUS BLD VENIPUNCTURE: CPT | Performed by: FAMILY MEDICINE

## 2022-10-07 PROCEDURE — 83540 ASSAY OF IRON: CPT | Performed by: FAMILY MEDICINE

## 2022-10-07 PROCEDURE — 82105 ALPHA-FETOPROTEIN SERUM: CPT | Performed by: FAMILY MEDICINE

## 2022-10-07 PROCEDURE — 99214 OFFICE O/P EST MOD 30 MIN: CPT | Mod: 25 | Performed by: FAMILY MEDICINE

## 2022-10-07 PROCEDURE — 90471 IMMUNIZATION ADMIN: CPT | Performed by: FAMILY MEDICINE

## 2022-10-07 PROCEDURE — 80061 LIPID PANEL: CPT | Performed by: FAMILY MEDICINE

## 2022-10-07 PROCEDURE — 83036 HEMOGLOBIN GLYCOSYLATED A1C: CPT | Performed by: FAMILY MEDICINE

## 2022-10-07 PROCEDURE — 81001 URINALYSIS AUTO W/SCOPE: CPT | Performed by: FAMILY MEDICINE

## 2022-10-07 PROCEDURE — 83550 IRON BINDING TEST: CPT | Performed by: FAMILY MEDICINE

## 2022-10-07 PROCEDURE — 91312 COVID-19,PF,PFIZER BOOSTER BIVALENT: CPT | Performed by: FAMILY MEDICINE

## 2022-10-07 PROCEDURE — 82306 VITAMIN D 25 HYDROXY: CPT | Performed by: FAMILY MEDICINE

## 2022-10-07 PROCEDURE — 84443 ASSAY THYROID STIM HORMONE: CPT | Performed by: FAMILY MEDICINE

## 2022-10-07 PROCEDURE — 83021 HEMOGLOBIN CHROMOTOGRAPHY: CPT | Performed by: FAMILY MEDICINE

## 2022-10-07 PROCEDURE — 85027 COMPLETE CBC AUTOMATED: CPT | Performed by: FAMILY MEDICINE

## 2022-10-07 PROCEDURE — 83020 HEMOGLOBIN ELECTROPHORESIS: CPT | Performed by: FAMILY MEDICINE

## 2022-10-07 PROCEDURE — 87517 HEPATITIS B DNA QUANT: CPT | Performed by: FAMILY MEDICINE

## 2022-10-07 PROCEDURE — 0124A COVID-19,PF,PFIZER BOOSTER BIVALENT: CPT | Performed by: FAMILY MEDICINE

## 2022-10-07 RX ORDER — FLUOXETINE 40 MG/1
40 CAPSULE ORAL DAILY
Qty: 90 CAPSULE | Refills: 3 | Status: SHIPPED | OUTPATIENT
Start: 2022-10-07 | End: 2024-02-05

## 2022-10-07 RX ORDER — FERROUS GLUCONATE 324(38)MG
324 TABLET ORAL EVERY OTHER DAY
Qty: 100 TABLET | Refills: 1 | Status: SHIPPED | OUTPATIENT
Start: 2022-10-07

## 2022-10-07 RX ORDER — METFORMIN HCL 500 MG
500 TABLET, EXTENDED RELEASE 24 HR ORAL
Qty: 90 TABLET | Refills: 0 | Status: SHIPPED | OUTPATIENT
Start: 2022-10-07 | End: 2022-12-22

## 2022-10-07 RX ORDER — TRAZODONE HYDROCHLORIDE 100 MG/1
100 TABLET ORAL AT BEDTIME
Qty: 90 TABLET | Refills: 4 | Status: SHIPPED | OUTPATIENT
Start: 2022-10-07 | End: 2024-02-05

## 2022-10-07 ASSESSMENT — ASTHMA QUESTIONNAIRES
QUESTION_1 LAST FOUR WEEKS HOW MUCH OF THE TIME DID YOUR ASTHMA KEEP YOU FROM GETTING AS MUCH DONE AT WORK, SCHOOL OR AT HOME: A LITTLE OF THE TIME
ACT_TOTALSCORE: 23
QUESTION_2 LAST FOUR WEEKS HOW OFTEN HAVE YOU HAD SHORTNESS OF BREATH: NOT AT ALL
ACT_TOTALSCORE: 23
QUESTION_3 LAST FOUR WEEKS HOW OFTEN DID YOUR ASTHMA SYMPTOMS (WHEEZING, COUGHING, SHORTNESS OF BREATH, CHEST TIGHTNESS OR PAIN) WAKE YOU UP AT NIGHT OR EARLIER THAN USUAL IN THE MORNING: NOT AT ALL
QUESTION_5 LAST FOUR WEEKS HOW WOULD YOU RATE YOUR ASTHMA CONTROL: WELL CONTROLLED
QUESTION_4 LAST FOUR WEEKS HOW OFTEN HAVE YOU USED YOUR RESCUE INHALER OR NEBULIZER MEDICATION (SUCH AS ALBUTEROL): NOT AT ALL

## 2022-10-07 ASSESSMENT — PATIENT HEALTH QUESTIONNAIRE - PHQ9
10. IF YOU CHECKED OFF ANY PROBLEMS, HOW DIFFICULT HAVE THESE PROBLEMS MADE IT FOR YOU TO DO YOUR WORK, TAKE CARE OF THINGS AT HOME, OR GET ALONG WITH OTHER PEOPLE: NOT DIFFICULT AT ALL
SUM OF ALL RESPONSES TO PHQ QUESTIONS 1-9: 0
SUM OF ALL RESPONSES TO PHQ QUESTIONS 1-9: 0

## 2022-10-07 NOTE — LETTER
My Asthma Action Plan    Name: Monica Doshi   YOB: 1985  Date: 10/7/2022   My doctor: Abril Martin MD   My clinic: [facility]        My Rescue Medicine:   Albuterol inhaler (Proair/Ventolin/Proventil HFA)  2-4 puffs EVERY 4 HOURS as needed. Use a spacer if recommended by your provider.   My Asthma Severity:   Intermittent / Exercise Induced            GREEN ZONE   Good Control    I feel good    No cough or wheeze    Can work, sleep and play without asthma symptoms       Take your asthma control medicine every day.     1. If exercise triggers your asthma, take your rescue medication    15 minutes before exercise or sports, and    During exercise if you have asthma symptoms  2. Spacer to use with inhaler: If you have a spacer, make sure to use it with your inhaler             YELLOW ZONE Getting Worse  I have ANY of these:    I do not feel good    Cough or wheeze    Chest feels tight    Wake up at night   1. Keep taking your Green Zone medications  2. Start taking your rescue medicine:    every 20 minutes for up to 1 hour. Then every 4 hours for 24-48 hours.  3. If you stay in the Yellow Zone for more than 12-24 hours, contact your doctor.  4. If you do not return to the Green Zone in 12-24 hours or you get worse, start taking your oral steroid medicine if prescribed by your provider.           RED ZONE Medical Alert - Get Help  I have ANY of these:    I feel awful    Medicine is not helping    Breathing getting harder    Trouble walking or talking    Nose opens wide to breathe       1. Take your rescue medicine NOW  2. If your provider has prescribed an oral steroid medicine, start taking it NOW  3. Call your doctor NOW  4. If you are still in the Red Zone after 20 minutes and you have not reached your doctor:    Take your rescue medicine again and    Call 911 or go to the emergency room right away    See your regular doctor within 2 weeks of an Emergency Room or Urgent Care  visit for follow-up treatment.          Annual Reminders:  Meet with Asthma Educator,  Flu Shot in the Fall, consider Pneumonia Vaccination for patients with asthma (aged 19 and older).    Pharmacy:    [facility] - [place], [place] - [address]  [id number] - [place], [place] - [address] [id number] - [place], [place] - [address]    Electronically signed by Abril Martin MD   Date: 10/07/22                    Asthma Triggers  How To Control Things That Make Your Asthma Worse    Triggers are things that make your asthma worse.  Look at the list below to help you find your triggers and   what you can do about them. You can help prevent asthma flare-ups by staying away from your triggers.      Trigger                                                          What you can do   Cigarette Smoke  Tobacco smoke can make asthma worse. Do not allow smoking in your home, car or around you.  Be sure no one smokes at a child s day care or school.  If you smoke, ask your health care provider for ways to help you quit.  Ask family members to quit too.  Ask your health care provider for a referral to Quit Plan to help you quit smoking, or call 7-963-526-PLAN.     Colds, Flu, Bronchitis  These are common triggers of asthma. Wash your hands often.  Don t touch your eyes, nose or mouth.  Get a flu shot every year.     Dust Mites  These are tiny bugs that live in cloth or carpet. They are too small to see. Wash sheets and blankets in hot water every week.   Encase pillows and mattress in dust mite proof covers.  Avoid having carpet if you can. If you have carpet, vacuum weekly.   Use a dust mask and HEPA vacuum.   Pollen and Outdoor Mold  Some people are allergic to trees, grass, or weed pollen, or molds. Try to keep your windows closed.  Limit time out doors when pollen count is high.   Ask you health care provider about taking medicine during allergy season.     Animal Dander  Some  people are allergic to skin flakes, urine or saliva from pets with fur or feathers. Keep pets with fur or feathers out of your home.    If you can t keep the pet outdoors, then keep the pet out of your bedroom.  Keep the bedroom door closed.  Keep pets off cloth furniture and away from stuffed toys.     Mice, Rats, and Cockroaches  Some people are allergic to the waste from these pests.   Cover food and garbage.  Clean up spills and food crumbs.  Store grease in the refrigerator.   Keep food out of the bedroom.   Indoor Mold  This can be a trigger if your home has high moisture. Fix leaking faucets, pipes, or other sources of water.   Clean moldy surfaces.  Dehumidify basement if it is damp and smelly.   Smoke, Strong Odors, and Sprays  These can reduce air quality. Stay away from strong odors and sprays, such as perfume, powder, hair spray, paints, smoke incense, paint, cleaning products, candles and new carpet.   Exercise or Sports  Some people with asthma have this trigger. Be active!  Ask your doctor about taking medicine before sports or exercise to prevent symptoms.    Warm up for 5-10 minutes before and after sports or exercise.     Other Triggers of Asthma  Cold air:  Cover your nose and mouth with a scarf.  Sometimes laughing or crying can be a trigger.  Some medicines and food can trigger asthma.

## 2022-10-07 NOTE — PROGRESS NOTES
Office Visit  [facility] - Family Medicine  Date of Service: Oct 7, 2022      Subjective   Monica Doshi is a 37 year old female who presents for   Chief Complaint   Patient presents with     Diabetes     Abnormal Bleeding Problem     Diabetes  Eating healthy  No pop. Drinking lots of water  Less meat, more fish  More veggies - gardened this summer, working in garden for activity  Taking medication regularly  Blood sugars 120-150. Checks when symptoms, but recently not having symptoms.    Weight  Peak 270, now 254    Headache    Stress  Hostile environment at work, made her feel short of breath  Needs new inhaler    Menses  Menses are now monthly, but having irregular light bleeding throughout the cycle  Declines a pelvic exam today due to having menses, but would like to see gynecologist  [place] has been painful with uterine cramping pain    5/16: Endometrial echo 20 mm, thickened.  Pt is bleeding, the measured thickness appears to have vascular flow within, may wish to consider hysteroscopy and/or D&C for optimal evaluation.  5/14: Endometrium, biopsy --  Focal complex hyperplasia without atypia and rare squamous   Morules, No malignancy seen. Some studies report an associated increased risk of concurrent or   future development of endometrial carcinoma with squamous morular metaplasia.  Recommend followup 3-6 interval endometrial curettage and close long term surveillance.     PHQ 10/7/2022   PHQ-9 Total Score 0   Q9: Thoughts of better off dead/self-harm past 2 weeks Not at all     No flowsheet data found.   ACT Total Scores 10/7/2022   ACT TOTAL SCORE (Goal Greater than or Equal to 20) 23   In the past 12 months, how many times did you visit the emergency room for your asthma without being admitted to the hospital? 0   In the past 12 months, how many times were you hospitalized overnight because of your asthma? 0      Objective   /89 (BP Location: Right arm, Patient Position:  Sitting, Cuff Size: Adult Large)   Pulse 78   Resp 18   Wt 115.2 kg (254 lb)   LMP  (LMP Unknown)   SpO2 95%   BMI 44.99 kg/m   She reports that she has been smoking cigarettes, cigarettes, and cigarettes. She has a 2.50 pack-year smoking history. She has never used smokeless tobacco.  Wt Readings from Last 6 Encounters:   10/07/22 115.2 kg (254 lb)   04/30/21 121.6 kg (268 lb)   11/08/19 119.7 kg (264 lb)   02/05/09 86.2 kg (190 lb)     Gen: Alert, no apparent distress.    Results for orders placed or performed in visit on 10/07/22   Hemoglobin A1c     Status: Abnormal   Result Value Ref Range    Hemoglobin A1C 6.4 (H) 0.0 - 5.6 %   CBC with platelets     Status: Abnormal   Result Value Ref Range    WBC Count 10.1 4.0 - 11.0 10e3/uL    RBC Count 5.58 (H) 3.80 - 5.20 10e6/uL    Hemoglobin 12.5 11.7 - 15.7 g/dL    Hematocrit 40.2 35.0 - 47.0 %    MCV 72 (L) 78 - 100 fL    MCH 22.4 (L) 26.5 - 33.0 pg    MCHC 31.1 (L) 31.5 - 36.5 g/dL    RDW 17.9 (H) 10.0 - 15.0 %    Platelet Count 320 150 - 450 10e3/uL   UA with Microscopic - lab collect     Status: Abnormal   Result Value Ref Range    Color Urine Yellow Colorless, Straw, Light Yellow, Yellow    Appearance Urine Clear Clear    Glucose Urine Negative Negative mg/dL    Bilirubin Urine Negative Negative    Ketones Urine Negative Negative mg/dL    Specific Gravity Urine 1.025 1.005 - 1.030    Blood Urine Large (A) Negative    pH Urine 7.0 5.0 - 8.0    Protein Albumin Urine 30  (A) Negative mg/dL    Urobilinogen Urine 1.0 0.2, 1.0 E.U./dL    Nitrite Urine Negative Negative    Leukocyte Esterase Urine Small (A) Negative   Urine Microscopic Exam     Status: Abnormal   Result Value Ref Range    Bacteria Urine Many (A) None Seen /HPF    RBC Urine 10-25 (A) 0-2 /HPF /HPF    WBC Urine 0-5 0-5 /HPF /HPF    Squamous Epithelials Urine Many (A) None Seen /LPF       Assessment & Plan     1. Type 2 diabetes, without complication, treated with oral medication.  Currently well  controlled with lifestyle modification and metformin.  Monica has made impressive strides in her nutrition, activity, and a healthy lifestyle, in general.  Referral made for eye exam.  Labs obtained.  Add in semaglutide 3 mg daily for co-management of weight.  Check blood sugars twice weekly, for the next month, as we start the semaglutide.  2. Severe obesity.  BMI of 44 is impacting her diabetes. Monica has made substantial progress in weight loss through improved nutrition and activity.  Add semaglutide 3 mg daily for weight management.  3. Abnormal uterine bleeding with dyspareunia.  History of complex endometrial hyperplasia.  Last assessed 5/16. Declines pelvic exam today.  Referral made to gynecology for evaluation and treatment.  4. Chronic hepatitis B.  Labs obtained to evaluate for hepatitis/hepatocellular carcinoma.  Ultrasound ordered.  5. Red blood cell microcytosis.  History of iron deficiency.  Check iron studies and hemoglobin electrophoresis.  6. Major depression is in full remission.  7. Mild intermittent asthma is well controlled.  ACT score 23, asthma action plan.  8. Functional constipation.  Continue healthy diet, water intake.  Add Metamucil.      Order Summary                                                      Type 2 diabetes mellitus without complication, without long-term current use of insulin (H)  -     Adult Eye  Referral; Future  -     Hemoglobin A1c; Future  -     Albumin Random Urine Quantitative with Creat Ratio; Future  -     TSH with free T4 reflex; Future  -     metFORMIN (GLUCOPHAGE XR) 500 MG 24 hr tablet; Take 1 tablet (500 mg) by mouth daily (with dinner)  -     Semaglutide 3 MG TABS; Take 3 mg by mouth daily  -     Hemoglobin A1c  -     TSH with free T4 reflex  -     Albumin Random Urine Quantitative with Creat Ratio    Body mass index (BMI) of 40.0-44.9 in adult (H)  -     Semaglutide 3 MG TABS; Take 3 mg by mouth daily    Secondary oligomenorrhea    Abnormal uterine  bleeding  -     Ob/Gyn Referral; Future    Dyspareunia in female  -     Ob/Gyn Referral; Future    Major depressive disorder, recurrent, in full remission (H)  -     traZODone (DESYREL) 100 MG tablet; Take 1 tablet (100 mg) by mouth At Bedtime  -     FLUoxetine (PROZAC) 40 MG capsule; Take 1 capsule (40 mg) by mouth daily    Iron deficiency anemia due to chronic blood loss  -     CBC with platelets; Future  -     Iron & Iron Binding Capacity; Future  -     ferrous gluconate (FERGON) 324 (38 Fe) MG tablet; Take 1 tablet (324 mg) by mouth every other day  -     CBC with platelets  -     Iron & Iron Binding Capacity  -     Hemoglobinopathy/Thalassemia Cascade; Future  -     Hemoglobinopathy/Thalassemia Cascade    Chronic hepatitis B (H)  -     Comprehensive metabolic panel; Future  -     US Abdomen Limited; Future  -     Hep B Virus DNA Quant Real Time PCR; Future  -     AFP tumor marker; Future  -     Comprehensive metabolic panel  -     Hep B Virus DNA Quant Real Time PCR  -     AFP tumor marker    Low serum high density lipoprotein (HDL)  -     Lipid panel reflex to direct LDL Fasting; Future  -     Lipid panel reflex to direct LDL Fasting    Vitamin D deficiency  -     Vitamin D Deficiency; Future  -     Vitamin D Deficiency    Functional constipation  -     psyllium (METAMUCIL/KONSYL) 58.6 % powder; Take 15 g by mouth daily    CKD (chronic kidney disease) stage 1, GFR 90 ml/min or greater  -     UA with Microscopic - lab collect  -     Urine Microscopic Exam    Other orders  -     COVID-19,PF,PFIZER BOOSTER BIVALENT (12+YRS)  -     PA FLU VAC PRESRV FREE QUAD SPLIT VIR IM  MONTHS IM        Immunizations Administered     Name Date Dose VIS Date Route    COVID-19,PF,Pfizer 12+ YRS BIVALENT Booster 10/7/22  8:44 AM 0.3 mL EUA,08/31/2022,Given today Intramuscular    INFLUENZA VACCINE IM > 6 MONTHS VALENT IIV4 10/7/22  8:45 AM 0.5 mL 08/06/2021, Given Today Intramuscular          No future appointments.    Completed  by: Abril Martin M.D., [facility]. 10/7/2022 8:22 AM.  This transcription uses voice recognition software, which may contain typographical errors.  MDM: [facility] neighborhood: [place] [address], language: English.    Asthma:  She presents for follow up of asthma.  She has no cough, some wheezing, and no shortness of breath. She is not using a relief medication. She does not have a controller medication. Patient is aware of the following triggers: cold air, dust mites, emotions, mold, pollens and strong odors and fumes. The patient has not had a visit to the Emergency Room, Urgent Care or Hospital due to asthma since the last clinic visit.     Diabetes:   She presents for follow up of diabetes.  She is checking home blood glucose a few times a month. She checks blood glucose before meals, after meals, before and after meals and at bedtime.  Blood glucose is never over 200 and never under 70. When her blood glucose is low, the patient is asymptomatic for confusion, blurred vision, lethargy and reports not feeling dizzy, shaky, or weak.  She is concerned about other. She is not experiencing numbness or burning in feet, excessive thirst, blurry vision, weight changes or redness, sores or blisters on feet. The patient has not had a diabetic eye exam in the last 12 months.         Reason for visit:  Follow up    She eats 2-3 servings of fruits and vegetables daily.She consumes 0 sweetened beverage(s) daily.She exercises with enough effort to increase her heart rate 20 to 29 minutes per day.  She exercises with enough effort to increase her heart rate 4 days per week.   She is taking medications regularly.    Answers for HPI/ROS submitted by the patient on 10/7/2022  If you checked off any problems, how difficult have these problems made it for you to do your work, take care of things at home, or get along with other people?: Not difficult at all  PHQ9 TOTAL SCORE: 0  Frequency of checking blood  sugars:: a few times a month  What time of day are you checking your blood sugars : before meals, after meals, before and after meals, at bedtime  Have you had any blood sugars above 200?: No  Have you had any blood sugars below 70?: No  Hypoglycemia symptoms:: none  Diabetic concerns:: other  Paraesthesia present:: none of these symptoms  Have you had a diabetic eye exam within the last year?: No  Do you have a cough?: No  Are you experiencing any wheezing in your chest?: Yes  Do you have any shortness of breath?: No  Use of rescue inhaler:: never  Taking Asthma medication as prescribed:: I do not have an asthma controller medication  Asthma triggers:: cold air, dust mites, emotions, mold, pollens, strong odors and fumes  Have you had any Emergency Room visits, Urgent Care visits, or Hospital Admissions since your last office visit?: No  What is the reason for your visit today? : Follow up  How many servings of fruits and vegetables do you eat daily?: 2-3  On average, how many sweetened beverages do you drink each day (Examples: soda, juice, sweet tea, etc.  Do NOT count diet or artificially sweetened beverages)?: 0  How many minutes a day do you exercise enough to make your heart beat faster?: 20 to 29  How many days a week do you exercise enough to make your heart beat faster?: 4  How many days per week do you miss taking your medication?: 0

## 2022-10-09 LAB — DEPRECATED CALCIDIOL+CALCIFEROL SERPL-MC: 24 UG/L (ref 20–75)

## 2022-10-10 PROBLEM — E11.29 TYPE 2 DIABETES MELLITUS WITH MICROALBUMINURIA, WITHOUT LONG-TERM CURRENT USE OF INSULIN (H): Status: ACTIVE | Noted: 2021-04-30

## 2022-10-10 PROBLEM — E61.1 IRON DEFICIENCY: Status: ACTIVE | Noted: 2022-10-10

## 2022-10-10 PROBLEM — R80.9 TYPE 2 DIABETES MELLITUS WITH MICROALBUMINURIA, WITHOUT LONG-TERM CURRENT USE OF INSULIN (H): Status: ACTIVE | Noted: 2021-04-30

## 2022-10-10 RX ORDER — FERROUS GLUCONATE 324(38)MG
324 TABLET ORAL EVERY OTHER DAY
Qty: 100 TABLET | Refills: 1 | Status: SHIPPED | OUTPATIENT
Start: 2022-10-10 | End: 2024-06-04

## 2022-10-11 LAB — HBV DNA SERPL NAA+PROBE-ACNC: NOT DETECTED IU/ML

## 2022-10-12 LAB
HEMOGLOBIN A2 QUANTITATION: 2 % (ref 2.2–3.5)
HEMOGLOBIN ELECTROPHRESIS: ABNORMAL
HEMOGLOBIN F QUANTITATION: <0.5 % (ref 0–2)
REVIEWING PATHOLOGIST: ABNORMAL

## 2022-10-20 ENCOUNTER — MYC MEDICAL ADVICE (OUTPATIENT)
Dept: FAMILY MEDICINE | Facility: CLINIC | Age: 37
End: 2022-10-20

## 2022-10-20 NOTE — TELEPHONE ENCOUNTER
According to patient's [employer] message, the following medication are not covered by her insurance.     PCP is not in. RN will route this encounter to COLLEEN, Dr. Reynolds, to review and advise.       Disp Refills Start End MORGAN   Semaglutide 3 MG TABS 90 tablet 1 10/7/2022  --   Sig - Route: Take 3 mg by mouth daily - Oral   Sent to pharmacy as: Semaglutide 3 MG Oral Tablet   Class: E-Prescribe   Order: [id number]   E-Prescribing Status: Receipt confirmed by pharmacy (10/7/2022  8:45 AM CDT)      Disp Refills Start End MORGAN   psyllium (METAMUCIL/KONSYL) 58.6 % powder 660 g 4 10/7/2022  --   Sig - Route: Take 15 g by mouth daily - Oral   Sent to pharmacy as: Psyllium 58.6 % Oral Powder (METAMUCIL/KONSYL)   Class: E-Prescribe   Order: [id number]   E-Prescribing Status: Receipt confirmed by pharmacy (10/7/2022  8:40 AM CDT)         Jagruti Pinedo RN  [facility]

## 2022-10-24 ENCOUNTER — TELEPHONE (OUTPATIENT)
Dept: FAMILY MEDICINE | Facility: CLINIC | Age: 37
End: 2022-10-24

## 2022-10-24 DIAGNOSIS — E11.29 TYPE 2 DIABETES MELLITUS WITH MICROALBUMINURIA, WITHOUT LONG-TERM CURRENT USE OF INSULIN (H): Primary | ICD-10-CM

## 2022-10-24 DIAGNOSIS — R80.9 TYPE 2 DIABETES MELLITUS WITH MICROALBUMINURIA, WITHOUT LONG-TERM CURRENT USE OF INSULIN (H): Primary | ICD-10-CM

## 2022-10-24 DIAGNOSIS — K59.04 FUNCTIONAL CONSTIPATION: Primary | ICD-10-CM

## 2022-10-24 DIAGNOSIS — N18.1 CKD (CHRONIC KIDNEY DISEASE) STAGE 1, GFR 90 ML/MIN OR GREATER: ICD-10-CM

## 2022-10-24 NOTE — TELEPHONE ENCOUNTER
Pt calling back to check on the status of these medications. Please call and advise. She would really like to get these medications and start using them. Thanks!

## 2022-10-24 NOTE — TELEPHONE ENCOUNTER
Disp Refills Start End MORGAN   psyllium (METAMUCIL/KONSYL) 58.6 % powder 660 g 4 10/7/2022  --   Sig - Route: Take 15 g by mouth daily - Oral   Sent to pharmacy as: Psyllium 58.6 % Oral Powder (METAMUCIL/KONSYL)   Class: E-Prescribe   Order: [id number]   E-Prescribing Status: Receipt confirmed by pharmacy (10/7/2022  8:40 AM CDT)

## 2022-10-24 NOTE — TELEPHONE ENCOUNTER
Disp Refills Start End MORGAN   Semaglutide 3 MG TABS 90 tablet 1 10/7/2022  --   Sig - Route: Take 3 mg by mouth daily - Oral   Sent to pharmacy as: Semaglutide 3 MG Oral Tablet   Class: E-Prescribe   Order: [id number]   E-Prescribing Status: Receipt confirmed by pharmacy (10/7/2022  8:45 AM CDT)

## 2022-10-24 NOTE — LETTER
"October 28, 2022      Monica Doshi  [address] [place] [address]        Dear Monica,     We have been trying to contact you regarding a medication. We were unable to reach you on three separate occasion.     \"Please tell Monica that the Ozempic is not covered.  So I sent in an alternative, called Bydureon.  It is a once a week injection.  It is pretty easy to use and well-tolerated.  I would recommend that if she has any questions, she bring it with her to her next diabetes education appointment to go over how to use it.\"    If you have any questions, please call the clinic number above.        Sincerely,        Abril Martin MD          "

## 2022-10-25 NOTE — TELEPHONE ENCOUNTER
Central Prior Authorization Team   Phone: [phone]      PA Initiation    Medication: Semaglutide 3 MG TABS - INITIATED  Insurance Company: [employer] - Phone [phone] Fax [phone]  Pharmacy Filling the Rx: [id number] - [place], [place] - [address]  Filling Pharmacy Phone: [phone]  Filling Pharmacy Fax:    Start Date: 10/25/2022

## 2022-10-25 NOTE — TELEPHONE ENCOUNTER
PA Initiation    Medication: Metamucil PA INITIATED  Insurance Company: [employer] - Phone [phone] Fax [phone]  Pharmacy Filling the Rx: [id number] - [place], [place] - [address]  Filling Pharmacy Phone: [phone]  Filling Pharmacy Fax:    Start Date: 10/25/2022    Central Prior Authorization Team   Phone: [phone]

## 2022-10-25 NOTE — TELEPHONE ENCOUNTER
I will see if they will cover a different type.  According to epic, it looks like it might be covered.  Monica was seen today for prior auth - medication.    Diagnoses and all orders for this visit:    Functional constipation  -     psyllium (METAMUCIL/KONSYL) 0.52 g capsule; Take 1-2 capsules by mouth daily For constipation

## 2022-10-25 NOTE — TELEPHONE ENCOUNTER
PRIOR AUTHORIZATION DENIED    Medication: Metamucil PA IDENIED    Denial Date: 10/25/2022    Denial Rational:

## 2022-10-26 NOTE — TELEPHONE ENCOUNTER
PRIOR AUTHORIZATION DENIED    Medication: Semaglutide 3 MG TABS - PA DENIED    Denial Date: 10/26/2022    Denial Rational: MUST TRY/FAIL TWO PREFERRED PRODUCTS - BYDUREON, BYETTA, AND MICKY      Appeal Information: IF PROVIDER WOULD LIKE TO APPEAL THIS DECISION PLEASE PROVIDE PA TEAM WITH LETTER OF MEDICAL NECESSITY

## 2022-10-26 NOTE — TELEPHONE ENCOUNTER
Writer attempt to call pt back regarding Dr. Martin' message below. No answer, left non-detailed VM with callback number.    If pt calls back, please relay Dr. Martin' message below to pt. Thanks.    ANABELA SandhuN, RN   [facility]

## 2022-10-26 NOTE — TELEPHONE ENCOUNTER
Please tell Monica that the Ozempic is not covered.  So I sent in an alternative, called Bydureon.  It is a once a week injection.  It is pretty easy to use and well-tolerated.  I would recommend that if she has any questions, she bring it with her to her next diabetes education appointment to go over how to use it.    Monica was seen today for prior auth - medication.    Diagnoses and all orders for this visit:    Type 2 diabetes mellitus  (H)  -     exenatide ER (BYDUREON BCISE) 2 MG/0.85ML auto-injector; Inject 2 mg Subcutaneous every 7 days  -     Amb Adult Diabetes Educator Referral; Future    BMI 40.0-44.9, adult (H)  -     exenatide ER (BYDUREON BCISE) 2 MG/0.85ML auto-injector; Inject 2 mg Subcutaneous every 7 days  -     Amb Adult Diabetes Educator Referral; Future    CKD (chronic kidney disease) stage 1, GFR 90 ml/min or greater  -     exenatide ER (BYDUREON BCISE) 2 MG/0.85ML auto-injector; Inject 2 mg Subcutaneous every 7 days  -     Amb Adult Diabetes Educator Referral; Future

## 2022-10-27 NOTE — TELEPHONE ENCOUNTER
Writer attempt #2 to call pt back regarding Dr. Martin' message below. No answer, left non-detailed VM with callback number.     If pt calls back, please relay Dr. Martin' message below to pt. Thanks.     ANABELA SandhuN, RN              [facility]

## 2022-10-28 NOTE — TELEPHONE ENCOUNTER
Writer attempt #3 to call pt back regarding Dr. Martin' message below. No answer, left non-detailed VM with callback number.     Third attempt, letter will be sent.    Closing encounter.     ANABELA SandhuN, RN              [facility]

## 2022-11-01 NOTE — TELEPHONE ENCOUNTER
Patient called back, message relayed and scheduled appt with Diabetic EducatorLisa for 11/18/2022 @ 10:00am. Patient will refridgerate medication until appt date. No further questions, completing task.

## 2022-11-07 ENCOUNTER — E-VISIT (OUTPATIENT)
Dept: FAMILY MEDICINE | Facility: CLINIC | Age: 37
End: 2022-11-07
Payer: COMMERCIAL

## 2022-11-07 DIAGNOSIS — K59.04 FUNCTIONAL CONSTIPATION: Primary | ICD-10-CM

## 2022-11-07 PROCEDURE — 99422 OL DIG E/M SVC 11-20 MIN: CPT | Performed by: FAMILY MEDICINE

## 2022-11-07 RX ORDER — BISACODYL 10 MG
10 SUPPOSITORY, RECTAL RECTAL DAILY PRN
Qty: 10 SUPPOSITORY | Refills: 1 | Status: SHIPPED | OUTPATIENT
Start: 2022-11-07 | End: 2024-05-10

## 2022-11-07 RX ORDER — POLYETHYLENE GLYCOL 3350 17 G/17G
1 POWDER, FOR SOLUTION ORAL 2 TIMES DAILY
Qty: 578 G | Refills: 3 | Status: SHIPPED | OUTPATIENT
Start: 2022-11-07 | End: 2024-05-10

## 2022-11-07 NOTE — PATIENT INSTRUCTIONS
Thank you for choosing us for your care. I have placed an order for a prescription so that you can start treatment. View your full visit summary for details by clicking on the link below. Your pharmacist will able to address any questions you may have about the medication.     Constipation    Until you have a bowel movement, I want you to do this:  -drink 80 ounces of water per day  -go for a 30 minute walk each day  -avoid foods that cause constipation: rice, noodles, cheese, dairy  -take miralax twice a day  -use a bisacodyl suppository once a day    Once you have a bowel movement and until your bowel movements are soft and regular:  -drink 80 ounces of water per day  -go for a 30 minute walk each day  -avoid foods that cause constipation: rice, noodles, cheese, dairy  -take miralax once a day    If you're not feeling better within 5-7 days, please schedule an appointment.  You can schedule an appointment right here in [facility], or call [phone]  If the visit is for the same symptoms as your eVisit, we'll refund the cost of your eVisit if seen within seven days.

## 2022-11-21 DIAGNOSIS — R80.9 TYPE 2 DIABETES MELLITUS WITH MICROALBUMINURIA, WITHOUT LONG-TERM CURRENT USE OF INSULIN (H): Primary | ICD-10-CM

## 2022-11-21 DIAGNOSIS — E11.29 TYPE 2 DIABETES MELLITUS WITH MICROALBUMINURIA, WITHOUT LONG-TERM CURRENT USE OF INSULIN (H): Primary | ICD-10-CM

## 2022-12-21 ENCOUNTER — MYC REFILL (OUTPATIENT)
Dept: FAMILY MEDICINE | Facility: CLINIC | Age: 37
End: 2022-12-21

## 2022-12-21 DIAGNOSIS — Z76.0 ENCOUNTER FOR MEDICATION REFILL: Primary | ICD-10-CM

## 2022-12-21 DIAGNOSIS — E11.9 TYPE 2 DIABETES MELLITUS WITHOUT COMPLICATION, WITHOUT LONG-TERM CURRENT USE OF INSULIN (H): ICD-10-CM

## 2022-12-21 NOTE — TELEPHONE ENCOUNTER
Medication Question or Refill    Contacts       Type Contact Phone/Fax    12/21/2022 02:09 PM CST Interface (Incoming) [facility] - [place], [place] - [address] [phone]    12/21/2022 02:19 PM CST Interface (Incoming) [facility] - [place], [place] - [address]           What medication are you calling about (include dose and sig)?: Metformin and Banophen    Controlled Substance Agreement on file:   CSA -- Patient Level:    CSA: None found at the patient level.       Who prescribed the medication?: PCP    Do you need a refill? Yes: pt is out completely and needs today.     When did you use the medication last? Unknown - pt is totally out    Patient offered an appointment? No    Do you have any questions or concerns?  No    Preferred Pharmacy:   [facility] [address] [place] [address]  Phone: [phone] Fax: [phone]      Could we send this information to you in [facility] or would you prefer to receive a phone call?:   Patient would prefer a phone call     Okay to leave a detailed message?: Yes at Home number on file [phone] (home)    Call taken on 12/21/22 at 220 Pm by LEONARD Vann

## 2022-12-22 RX ORDER — DIPHENHYDRAMINE HYDROCHLORIDE 25 MG/1
CAPSULE ORAL
Qty: 200 CAPSULE | Refills: 0 | OUTPATIENT
Start: 2022-12-22

## 2022-12-22 RX ORDER — METFORMIN HCL 500 MG
TABLET, EXTENDED RELEASE 24 HR ORAL
Qty: 90 TABLET | Refills: 0 | Status: SHIPPED | OUTPATIENT
Start: 2022-12-22 | End: 2023-06-09

## 2022-12-22 RX ORDER — METFORMIN HCL 500 MG
500 TABLET, EXTENDED RELEASE 24 HR ORAL
Qty: 90 TABLET | Refills: 0 | OUTPATIENT
Start: 2022-12-22

## 2022-12-22 NOTE — TELEPHONE ENCOUNTER
"Last Written Prescription Date:  10/7/22  Last Fill Quantity: 90,  # refills: 0   Last office visit provider:  10/7/22     Outpatient Medication Detail     Disp Refills Start End MORGAN   BANOPHEN 25 MG capsule (Discontinued) 100 capsule 3 4/23/2022 7/27/2022 No   Sig: TAKE ONE CAPSULE BY MOUTH EVERY SIX HOURS AS NEEDED FOR ITCHING   Sent to pharmacy as: Banophen 25 MG Oral Capsule (diphenhydrAMINE)   Class: E-Prescribe   Order: [id number]         Requested Prescriptions   Pending Prescriptions Disp Refills     metFORMIN (GLUCOPHAGE XR) 500 MG 24 hr tablet [Pharmacy Med Name: metFORMIN HCl ER Oral Tablet Extended Release 24 Hour 500 MG] 90 tablet 0     Sig: TAKE 1 TABLET BY MOUTH DAILY WITH DINNER       Biguanide Agents Passed - 12/21/2022  2:22 PM        Passed - Patient is age 10 or older        Passed - Patient has documented A1c within the specified period of time.     If HgbA1C is 8 or greater, it needs to be on file within the past 3 months.  If less than 8, must be on file within the past 6 months.     Recent Labs   Lab Test 10/07/22  0905   A1C 6.4*             Passed - Patient's CR is NOT>1.4 OR Patient's EGFR is NOT<45 within past 12 mos.     Recent Labs   Lab Test 10/07/22  0905 04/30/21  1013   GFRESTIMATED >90 >60   GFRESTBLACK  --  >60       Recent Labs   Lab Test 10/07/22  0905   CR 0.58             Passed - Patient does NOT have a diagnosis of CHF.        Passed - Medication is active on med list        Passed - Patient is not pregnant        Passed - Patient has not had a positive pregnancy test within the past 12 mos.         Passed - Recent (6 mo) or future (30 days) visit within the authorizing provider's specialty     Patient had office visit in the last 6 months or has a visit in the next 30 days with authorizing provider or within the authorizing provider's specialty.  See \"Patient Info\" tab in inbasket, or \"Choose Columns\" in Meds & Orders section of the refill encounter.               BANOPHEN " "25 MG capsule [Pharmacy Med Name: Banophen Oral Capsule 25 MG] 200 capsule 0     Sig: TAKE ONE CAPSULE BY MOUTH EVERY SIX HOURS AS NEEDED FOR ITCHING       Antihistamines Protocol Failed - 12/21/2022  2:22 PM        Failed - Medication is active on med list        Passed - Recent (12 mo) or future (30 days) visit within the authorizing provider's specialty     Patient has had an office visit with the authorizing provider or a provider within the authorizing providers department within the previous 12 mos or has a future within next 30 days. See \"Patient Info\" tab in inbasket, or \"Choose Columns\" in Meds & Orders section of the refill encounter.              Passed - Patient is age 3 or older     Apply age and/or weight-based dosing for peds patients age 3 and older.    Forward request to provider for patients under the age of 3.               Nola Busch, RN 12/22/22 2:59 PM  "

## 2022-12-31 RX ORDER — DIPHENHYDRAMINE HYDROCHLORIDE 25 MG/1
CAPSULE ORAL
Qty: 200 CAPSULE | Refills: 0 | OUTPATIENT
Start: 2022-12-31

## 2023-01-01 NOTE — TELEPHONE ENCOUNTER
Outpatient Medication Detail     Disp Refills Start End MORGAN   BANOPHEN 25 MG capsule (Discontinued) 100 capsule 3 4/23/2022 7/27/2022 No   Sig: TAKE ONE CAPSULE BY MOUTH EVERY SIX HOURS AS NEEDED FOR ITCHING   Sent to pharmacy as: Banophen 25 MG Oral Capsule (diphenhydrAMINE)   Class: E-Prescribe

## 2023-04-23 ENCOUNTER — HEALTH MAINTENANCE LETTER (OUTPATIENT)
Age: 38
End: 2023-04-23

## 2023-06-14 ENCOUNTER — TELEPHONE (OUTPATIENT)
Dept: FAMILY MEDICINE | Facility: CLINIC | Age: 38
End: 2023-06-14

## 2023-06-14 NOTE — TELEPHONE ENCOUNTER
MTM referral from: [facility] visit (referral by provider)    MTM referral outreach attempt #2 on June 14, 2023 at 9:35 AM      Outcome: Patient not reachable after several attempts, will route to [facility] Pharmacist/Provider as an FYI.  [facility] scheduling number is [phone].  Thank you for the referral.    Use HP MA for the carrier/Plan on the flowsheet    Puja Brown [facility]

## 2023-07-15 ENCOUNTER — HEALTH MAINTENANCE LETTER (OUTPATIENT)
Age: 38
End: 2023-07-15

## 2023-07-24 ENCOUNTER — NURSE TRIAGE (OUTPATIENT)
Dept: NURSING | Facility: CLINIC | Age: 38
End: 2023-07-24

## 2023-07-25 DIAGNOSIS — Z76.0 ENCOUNTER FOR MEDICATION REFILL: ICD-10-CM

## 2023-07-25 DIAGNOSIS — E11.9 TYPE 2 DIABETES MELLITUS WITHOUT COMPLICATION, WITHOUT LONG-TERM CURRENT USE OF INSULIN (H): ICD-10-CM

## 2023-07-25 RX ORDER — METFORMIN HCL 500 MG
TABLET, EXTENDED RELEASE 24 HR ORAL
Qty: 90 TABLET | Refills: 0 | Status: SHIPPED | OUTPATIENT
Start: 2023-07-25 | End: 2024-02-05

## 2023-07-25 NOTE — TELEPHONE ENCOUNTER
"Routing refill request to provider for review/approval because:  Labs not current:  Last A1C was 6.4 on 10/7/22  Patient needs to be seen because it has been more than 6 months since last office visit.    Last Written Prescription Date:  6/9/23  Last Fill Quantity: 90,  # refills: 0   Last office visit provider:  10/7/22     Requested Prescriptions   Pending Prescriptions Disp Refills    metFORMIN (GLUCOPHAGE XR) 500 MG 24 hr tablet [Pharmacy Med Name: metFORMIN HCl ER Oral Tablet Extended Release 24 Hour 500 MG] 90 tablet 0     Sig: TAKE 1 TABLET BY MOUTH DAILY WITH DINNER       Biguanide Agents Failed - 7/25/2023  3:32 PM        Failed - Patient has documented A1c within the specified period of time.     If HgbA1C is 8 or greater, it needs to be on file within the past 3 months.  If less than 8, must be on file within the past 6 months.     Recent Labs   Lab Test 10/07/22  0905   A1C 6.4*             Failed - Recent (6 mo) or future (30 days) visit within the authorizing provider's specialty     Patient had office visit in the last 6 months or has a visit in the next 30 days with authorizing provider or within the authorizing provider's specialty.  See \"Patient Info\" tab in inbasket, or \"Choose Columns\" in Meds & Orders section of the refill encounter.            Passed - Patient is age 10 or older        Passed - Patient's CR is NOT>1.4 OR Patient's EGFR is NOT<45 within past 12 mos.     Recent Labs   Lab Test 10/07/22  0905 04/30/21  1013   GFRESTIMATED >90 >60   GFRESTBLACK  --  >60       Recent Labs   Lab Test 10/07/22  0905   CR 0.58             Passed - Patient does NOT have a diagnosis of CHF.        Passed - Medication is active on med list        Passed - Patient is not pregnant        Passed - Patient has not had a positive pregnancy test within the past 12 mos.              Viki Rai RN 07/25/23 3:32 PM  "

## 2023-07-25 NOTE — TELEPHONE ENCOUNTER
Pt has been feeling generally fatigued for the last week. She states that she slept for 18 hours last night. She thought maybe it was her diabetes but her BS was normal. She does not have a fever as far as she know sbut she was hot and sweaty last night but feels ok now. She does not have any respiratory issues currently. She states she does have some mild stomach upset. Recommended she get a home test to test herself. She can also call the clinic in the AM to see if she needs to be seen or if they can order a PCR. Recommended that she manage symptoms appropriately.       Reason for Disposition   [1] COVID-19 diagnosed AND [2] has mild nausea, vomiting or diarrhea    Additional Information   Negative: SEVERE difficulty breathing (e.g., struggling for each breath, speaks in single words)   Negative: Difficult to awaken or acting confused (e.g., disoriented, slurred speech)   Negative: Bluish (or gray) lips or face now   Negative: Shock suspected (e.g., cold/pale/clammy skin, too weak to stand, low BP, rapid pulse)   Negative: Sounds like a life-threatening emergency to the triager   Negative: SEVERE or constant chest pain or pressure  (Exception: Mild central chest pain, present only when coughing.)   Negative: MODERATE difficulty breathing (e.g., speaks in phrases, SOB even at rest, pulse 100-120)   Negative: [1] Headache AND [2] stiff neck (can't touch chin to chest)   Negative: Oxygen level (e.g., pulse oximetry) 90 percent or lower   Negative: Chest pain or pressure   Negative: Patient sounds very sick or weak to the triager   Negative: MILD difficulty breathing (e.g., minimal/no SOB at rest, SOB with walking, pulse <100)   Negative: Fever > 103 F (39.4 C)   Negative: [1] Fever > 101 F (38.3 C) AND [2] age > 60 years   Negative: [1] Fever > 100.0 F (37.8 C) AND [2] bedridden (e.g., nursing home patient, CVA, chronic illness, recovering from surgery)   Negative: [1] HIGH RISK for severe COVID complications (e.g.,  weak immune system, age > 64 years, obesity with BMI > 25, pregnant, chronic lung disease or other chronic medical condition) AND [2] COVID symptoms (e.g., cough, fever)  (Exceptions: Already seen by PCP and no new or worsening symptoms.)   Negative: [1] HIGH RISK patient AND [2] influenza is widespread in the community AND [3] ONE OR MORE respiratory symptoms: cough, sore throat, runny or stuffy nose   Negative: [1] HIGH RISK patient AND [2] influenza exposure within the last 7 days AND [3] ONE OR MORE respiratory symptoms: cough, sore throat, runny or stuffy nose   Negative: Oxygen level (e.g., pulse oximetry) 91 to 94 percent   Negative: Fever present > 3 days (72 hours)   Negative: [1] Fever returns after gone for over 24 hours AND [2] symptoms worse or not improved   Negative: [1] Continuous (nonstop) coughing interferes with work or school AND [2] no improvement using cough treatment per Care Advice   Negative: [1] COVID-19 infection suspected by caller or triager AND [2] mild symptoms (cough, fever, or others) AND [3] negative COVID-19 rapid test   Negative: Cough present > 3 weeks   Negative: [1] COVID-19 diagnosed by positive lab test (e.g., PCR, rapid self-test kit) AND [2] NO symptoms (e.g., cough, fever, others)   Negative: [1] COVID-19 diagnosed by positive lab test (e.g., PCR, rapid self-test kit) AND [2] mild symptoms (e.g., cough, fever, others) AND [3] no complications or SOB   Negative: [1] COVID-19 diagnosed by doctor (or NP/PA) AND [2] mild symptoms (e.g., cough, fever, others) AND [3] no complications or SOB    Protocols used: Coronavirus (COVID-19) Diagnosed or [id number]    Bobbi Hunt RN on 7/24/2023 at 9:33 PM

## 2023-09-23 ENCOUNTER — HEALTH MAINTENANCE LETTER (OUTPATIENT)
Age: 38
End: 2023-09-23

## 2023-12-14 ENCOUNTER — LAB (OUTPATIENT)
Dept: LAB | Facility: CLINIC | Age: 38
End: 2023-12-14
Attending: EMERGENCY MEDICINE
Payer: COMMERCIAL

## 2023-12-14 ENCOUNTER — E-VISIT (OUTPATIENT)
Dept: URGENT CARE | Facility: CLINIC | Age: 38
End: 2023-12-14
Payer: COMMERCIAL

## 2023-12-14 ENCOUNTER — TELEPHONE (OUTPATIENT)
Dept: URGENT CARE | Facility: CLINIC | Age: 38
End: 2023-12-14

## 2023-12-14 DIAGNOSIS — R06.2 WHEEZING: Primary | ICD-10-CM

## 2023-12-14 DIAGNOSIS — R06.2 WHEEZING: ICD-10-CM

## 2023-12-14 DIAGNOSIS — J06.9 VIRAL URI WITH COUGH: ICD-10-CM

## 2023-12-14 PROCEDURE — 87635 SARS-COV-2 COVID-19 AMP PRB: CPT

## 2023-12-14 PROCEDURE — 99207 PR NON-BILLABLE SERV PER CHARTING: CPT | Performed by: EMERGENCY MEDICINE

## 2023-12-14 RX ORDER — ALBUTEROL SULFATE 90 UG/1
2 AEROSOL, METERED RESPIRATORY (INHALATION) EVERY 4 HOURS PRN
Qty: 18 G | Refills: 0 | Status: SHIPPED | OUTPATIENT
Start: 2023-12-14 | End: 2024-07-23

## 2023-12-14 NOTE — LETTER
December 14, 2023      Monica Doshi  [address] [place] [address]        To Whom It May Concern:    Monica Doshi  was seen on 12/14/23.  Please excuse her from missed work on 12/13 and 12/14, until 12/15/23, due to illness.        Sincerely,        Elvis Guillen MD

## 2023-12-14 NOTE — TELEPHONE ENCOUNTER
Patient in clinic today for covid test requesting work note excusing her from work yesterday 12/13 and today 12/14. Evaluated today by virtual UC provider via [employer]. Will route to ordering provider. Would prefer letter sent via blinkbox.

## 2023-12-15 LAB — SARS-COV-2 RNA RESP QL NAA+PROBE: NEGATIVE

## 2024-01-18 PROBLEM — F33.2 SEVERE EPISODE OF RECURRENT MAJOR DEPRESSIVE DISORDER, WITHOUT PSYCHOTIC FEATURES (H): Status: ACTIVE | Noted: 2024-01-18

## 2024-02-05 ENCOUNTER — MYC REFILL (OUTPATIENT)
Dept: FAMILY MEDICINE | Facility: CLINIC | Age: 39
End: 2024-02-05
Payer: COMMERCIAL

## 2024-02-05 DIAGNOSIS — M54.40 CHRONIC LOW BACK PAIN WITH SCIATICA, SCIATICA LATERALITY UNSPECIFIED, UNSPECIFIED BACK PAIN LATERALITY: ICD-10-CM

## 2024-02-05 DIAGNOSIS — G89.29 CHRONIC LOW BACK PAIN WITH SCIATICA, SCIATICA LATERALITY UNSPECIFIED, UNSPECIFIED BACK PAIN LATERALITY: ICD-10-CM

## 2024-02-05 DIAGNOSIS — F33.42 MAJOR DEPRESSIVE DISORDER, RECURRENT, IN FULL REMISSION (H): ICD-10-CM

## 2024-02-05 DIAGNOSIS — R80.9 TYPE 2 DIABETES MELLITUS WITH MICROALBUMINURIA, WITHOUT LONG-TERM CURRENT USE OF INSULIN (H): ICD-10-CM

## 2024-02-05 DIAGNOSIS — B18.1 CHRONIC VIRAL HEPATITIS B WITHOUT DELTA AGENT AND WITHOUT COMA (H): ICD-10-CM

## 2024-02-05 DIAGNOSIS — B18.1 CHRONIC HEPATITIS B (H): ICD-10-CM

## 2024-02-05 DIAGNOSIS — E55.9 VITAMIN D DEFICIENCY: ICD-10-CM

## 2024-02-05 DIAGNOSIS — N18.1 CKD (CHRONIC KIDNEY DISEASE) STAGE 1, GFR 90 ML/MIN OR GREATER: ICD-10-CM

## 2024-02-05 DIAGNOSIS — E11.9 TYPE 2 DIABETES MELLITUS WITHOUT COMPLICATION, WITHOUT LONG-TERM CURRENT USE OF INSULIN (H): Primary | ICD-10-CM

## 2024-02-05 DIAGNOSIS — Z76.0 ENCOUNTER FOR MEDICATION REFILL: ICD-10-CM

## 2024-02-05 DIAGNOSIS — D50.0 IRON DEFICIENCY ANEMIA DUE TO CHRONIC BLOOD LOSS: ICD-10-CM

## 2024-02-05 DIAGNOSIS — J30.2 SEASONAL ALLERGIC RHINITIS, UNSPECIFIED TRIGGER: Primary | ICD-10-CM

## 2024-02-05 DIAGNOSIS — R74.8 LOW SERUM HIGH DENSITY LIPOPROTEIN (HDL): ICD-10-CM

## 2024-02-05 DIAGNOSIS — E11.29 TYPE 2 DIABETES MELLITUS WITH MICROALBUMINURIA, WITHOUT LONG-TERM CURRENT USE OF INSULIN (H): ICD-10-CM

## 2024-02-05 RX ORDER — PSEUDOEPHED/ACETAMINOPH/DIPHEN 30MG-500MG
1000 TABLET ORAL EVERY 6 HOURS PRN
Qty: 200 TABLET | Refills: 4 | Status: SHIPPED | OUTPATIENT
Start: 2024-02-05

## 2024-02-05 RX ORDER — FLUTICASONE PROPIONATE 50 MCG
2 SPRAY, SUSPENSION (ML) NASAL DAILY
Qty: 18.2 G | Refills: 4 | Status: SHIPPED | OUTPATIENT
Start: 2024-02-05

## 2024-02-05 RX ORDER — METFORMIN HCL 500 MG
500 TABLET, EXTENDED RELEASE 24 HR ORAL
Qty: 90 TABLET | Refills: 0 | Status: SHIPPED | OUTPATIENT
Start: 2024-02-05

## 2024-02-05 RX ORDER — TRAZODONE HYDROCHLORIDE 100 MG/1
100 TABLET ORAL AT BEDTIME
Qty: 30 TABLET | Refills: 0 | Status: SHIPPED | OUTPATIENT
Start: 2024-02-05 | End: 2024-05-03

## 2024-02-05 RX ORDER — FLUOXETINE 40 MG/1
40 CAPSULE ORAL DAILY
Qty: 90 CAPSULE | Refills: 0 | OUTPATIENT
Start: 2024-02-05

## 2024-02-05 RX ORDER — FLUOXETINE 40 MG/1
40 CAPSULE ORAL DAILY
Qty: 90 CAPSULE | Refills: 0 | Status: SHIPPED | OUTPATIENT
Start: 2024-02-05 | End: 2024-05-03

## 2024-02-05 RX ORDER — TRAZODONE HYDROCHLORIDE 100 MG/1
100 TABLET ORAL AT BEDTIME
Qty: 90 TABLET | Refills: 0 | OUTPATIENT
Start: 2024-02-05

## 2024-02-05 NOTE — TELEPHONE ENCOUNTER
Due for visit please schedule:  NOW - lab + shots  When available - physical  Asthma control test can be done in person or virtual.    No future appointments.   Health Maintenance Due   Topic Date Due    DIABETIC FOOT EXAM  Never done    DEPRESSION ACTION PLAN  Never done    Pneumococcal Vaccine: Pediatrics (0 to 5 Years) and At-Risk Patients (6 to 64 Years) (1 of 2 - PCV) Never done    HEPATITIS A IMMUNIZATION (1 of 2 - Risk 2-dose series) Never done    PAP  Never done    HPV IMMUNIZATION (3 - 3-dose series) 03/17/2010    DTAP/TDAP/TD IMMUNIZATION (2 - Td or Tdap) 09/17/2019    NICOTINE/TOBACCO CESSATION COUNSELING Q 1 YR  04/30/2022    YEARLY PREVENTIVE VISIT  04/30/2022    A1C  01/07/2023    ASTHMA CONTROL TEST  04/07/2023    PHQ-9  04/07/2023    INFLUENZA VACCINE (1) 09/01/2023    COVID-19 Vaccine (3 - 2023-24 season) 09/01/2023    BMP  10/07/2023    LIPID  10/07/2023    MICROALBUMIN  10/07/2023    ASTHMA ACTION PLAN  10/07/2023    EYE EXAM  02/16/2024     BP Readings from Last 3 Encounters:   10/07/22 128/89   04/30/21 132/84   11/08/19 124/88     Monica was seen today for refill request.    Diagnoses and all orders for this visit:    Type 2 diabetes mellitus without complication, without long-term current use of insulin (H)  -     Adult Diabetes Education  Referral; Future    Encounter for medication refill  -     metFORMIN (GLUCOPHAGE XR) 500 MG 24 hr tablet; Take 1 tablet (500 mg) by mouth daily (with dinner)    Chronic viral hepatitis B without delta agent and without coma (H)  -     Hep B Virus DNA Quant Real Time PCR; Standing  -     Hepatitis B surface antigen; Future    Type 2 diabetes mellitus  (H)  -     metFORMIN (GLUCOPHAGE XR) 500 MG 24 hr tablet; Take 1 tablet (500 mg) by mouth daily (with dinner)  -     Hemoglobin A1c; Future  -     Albumin Random Urine Quantitative with Creat Ratio; Future  -     TSH with free T4 reflex; Future  -     Adult Eye  Referral; Future    Iron  deficiency anemia due to chronic blood loss  -     CBC with platelets; Future  -     Ferritin; Future    CKD (chronic kidney disease) stage 1, GFR 90 ml/min or greater  -     Comprehensive metabolic panel; Future  -     Albumin Random Urine Quantitative with Creat Ratio; Future    Chronic hepatitis B (H)  -     Comprehensive metabolic panel; Future    Vitamin D deficiency  -     Vitamin D deficiency screening; Future    Low serum high density lipoprotein (HDL)  -     Lipid panel reflex to direct LDL Fasting; Future    Other orders  -     Pneumococcal 20 Valent Conjugate (Prevnar 20); Future  -     TDAP 10-64Y (ADACEL,BOOSTRIX); Future  -     INFLUENZA VACCINE IM > 6 MONTHS VALENT IIV4 (AFLURIA/FLUZONE); Future  -     COVID-19 12+ (2023-24) (PFIZER); Future  -     PRIMARY CARE FOLLOW-UP SCHEDULING; Future  -     PRIMARY CARE FOLLOW-UP SCHEDULING; Future  -     PRIMARY CARE FOLLOW-UP SCHEDULING; Future

## 2024-02-05 NOTE — TELEPHONE ENCOUNTER
Patient declined to schedule lab and shots visit. Patient would like to see pcp in regards to lab orders and diabetes. Patient is wondering if they can be seen some time this month, February or March. Virtual is ok per patient. Can we use reserved spots to schedule patient? Please advise, thanks.

## 2024-02-06 NOTE — TELEPHONE ENCOUNTER
Future Appointments 2/6/2024 - 8/4/2024        Date Visit Type Length Department Provider     2/22/2024  4:40 PM OFFICE VISIT 20 min [facility] Abril Martin MD    Location Instructions:     [facility] is located at [address] [place] [address] in [place], at the intersection of [facility]. This is one block south of the [facility]. Free parking is available in the lot directly north of the clinic across [facility]. The clinic is near stops along bus [address].                    Patient declined to come in for labs beforehand again. Patient is scheduled.

## 2024-02-10 ENCOUNTER — HEALTH MAINTENANCE LETTER (OUTPATIENT)
Age: 39
End: 2024-02-10

## 2024-02-22 ENCOUNTER — OFFICE VISIT (OUTPATIENT)
Dept: FAMILY MEDICINE | Facility: CLINIC | Age: 39
End: 2024-02-22
Payer: COMMERCIAL

## 2024-02-22 VITALS
OXYGEN SATURATION: 96 % | DIASTOLIC BLOOD PRESSURE: 75 MMHG | TEMPERATURE: 97.1 F | RESPIRATION RATE: 20 BRPM | BODY MASS INDEX: 45.07 KG/M2 | SYSTOLIC BLOOD PRESSURE: 119 MMHG | HEART RATE: 100 BPM | HEIGHT: 64 IN | WEIGHT: 264 LBS

## 2024-02-22 DIAGNOSIS — N18.1 CKD (CHRONIC KIDNEY DISEASE) STAGE 1, GFR 90 ML/MIN OR GREATER: ICD-10-CM

## 2024-02-22 DIAGNOSIS — E11.29 TYPE 2 DIABETES MELLITUS WITH MICROALBUMINURIA, WITHOUT LONG-TERM CURRENT USE OF INSULIN (H): Primary | ICD-10-CM

## 2024-02-22 DIAGNOSIS — B18.1 CHRONIC HEPATITIS B (H): ICD-10-CM

## 2024-02-22 DIAGNOSIS — Z79.899 MEDICATION MANAGEMENT: ICD-10-CM

## 2024-02-22 DIAGNOSIS — R76.8 HEPATITIS B SURFACE ANTIGEN POSITIVE: ICD-10-CM

## 2024-02-22 DIAGNOSIS — R80.9 TYPE 2 DIABETES MELLITUS WITH MICROALBUMINURIA, WITHOUT LONG-TERM CURRENT USE OF INSULIN (H): Primary | ICD-10-CM

## 2024-02-22 DIAGNOSIS — Z12.4 CERVICAL CANCER SCREENING: ICD-10-CM

## 2024-02-22 DIAGNOSIS — N85.01 ENDOMETRIAL HYPERPLASIA, COMPLEX: ICD-10-CM

## 2024-02-22 DIAGNOSIS — D50.0 IRON DEFICIENCY ANEMIA DUE TO CHRONIC BLOOD LOSS: ICD-10-CM

## 2024-02-22 DIAGNOSIS — B18.1 CHRONIC VIRAL HEPATITIS B WITHOUT DELTA AGENT AND WITHOUT COMA (H): ICD-10-CM

## 2024-02-22 DIAGNOSIS — F43.21 GRIEF: ICD-10-CM

## 2024-02-22 DIAGNOSIS — E55.9 VITAMIN D DEFICIENCY: ICD-10-CM

## 2024-02-22 DIAGNOSIS — R74.8 LOW SERUM HIGH DENSITY LIPOPROTEIN (HDL): ICD-10-CM

## 2024-02-22 LAB
CREAT UR-MCNC: 101 MG/DL
ERYTHROCYTE [DISTWIDTH] IN BLOOD BY AUTOMATED COUNT: 13.3 % (ref 10–15)
HBA1C MFR BLD: 7 % (ref 0–5.6)
HCT VFR BLD AUTO: 45.9 % (ref 35–47)
HGB BLD-MCNC: 15 G/DL (ref 11.7–15.7)
MCH RBC QN AUTO: 27.9 PG (ref 26.5–33)
MCHC RBC AUTO-ENTMCNC: 32.7 G/DL (ref 31.5–36.5)
MCV RBC AUTO: 85 FL (ref 78–100)
MICROALBUMIN UR-MCNC: 30.2 MG/L
MICROALBUMIN/CREAT UR: 29.9 MG/G CR (ref 0–25)
PLATELET # BLD AUTO: 255 10E3/UL (ref 150–450)
RBC # BLD AUTO: 5.38 10E6/UL (ref 3.8–5.2)
WBC # BLD AUTO: 7.6 10E3/UL (ref 4–11)

## 2024-02-22 PROCEDURE — 80053 COMPREHEN METABOLIC PANEL: CPT | Performed by: FAMILY MEDICINE

## 2024-02-22 PROCEDURE — 87341 HEP B SURFACE AG NEUTRLZJ IA: CPT | Performed by: FAMILY MEDICINE

## 2024-02-22 PROCEDURE — 86692 HEPATITIS DELTA AGENT ANTBDY: CPT | Mod: 90 | Performed by: FAMILY MEDICINE

## 2024-02-22 PROCEDURE — 36415 COLL VENOUS BLD VENIPUNCTURE: CPT | Performed by: FAMILY MEDICINE

## 2024-02-22 PROCEDURE — 82728 ASSAY OF FERRITIN: CPT | Performed by: FAMILY MEDICINE

## 2024-02-22 PROCEDURE — 99000 SPECIMEN HANDLING OFFICE-LAB: CPT | Performed by: FAMILY MEDICINE

## 2024-02-22 PROCEDURE — 86708 HEPATITIS A ANTIBODY: CPT | Performed by: FAMILY MEDICINE

## 2024-02-22 PROCEDURE — 90686 IIV4 VACC NO PRSV 0.5 ML IM: CPT | Performed by: FAMILY MEDICINE

## 2024-02-22 PROCEDURE — 80061 LIPID PANEL: CPT | Performed by: FAMILY MEDICINE

## 2024-02-22 PROCEDURE — 90472 IMMUNIZATION ADMIN EACH ADD: CPT | Performed by: FAMILY MEDICINE

## 2024-02-22 PROCEDURE — 82043 UR ALBUMIN QUANTITATIVE: CPT | Performed by: FAMILY MEDICINE

## 2024-02-22 PROCEDURE — 84443 ASSAY THYROID STIM HORMONE: CPT | Performed by: FAMILY MEDICINE

## 2024-02-22 PROCEDURE — 87340 HEPATITIS B SURFACE AG IA: CPT | Performed by: FAMILY MEDICINE

## 2024-02-22 PROCEDURE — 82607 VITAMIN B-12: CPT | Performed by: FAMILY MEDICINE

## 2024-02-22 PROCEDURE — 90480 ADMN SARSCOV2 VAC 1/ONLY CMP: CPT | Performed by: FAMILY MEDICINE

## 2024-02-22 PROCEDURE — 82570 ASSAY OF URINE CREATININE: CPT | Performed by: FAMILY MEDICINE

## 2024-02-22 PROCEDURE — 85027 COMPLETE CBC AUTOMATED: CPT | Performed by: FAMILY MEDICINE

## 2024-02-22 PROCEDURE — 91320 SARSCV2 VAC 30MCG TRS-SUC IM: CPT | Performed by: FAMILY MEDICINE

## 2024-02-22 PROCEDURE — 90471 IMMUNIZATION ADMIN: CPT | Performed by: FAMILY MEDICINE

## 2024-02-22 PROCEDURE — 99214 OFFICE O/P EST MOD 30 MIN: CPT | Mod: 25 | Performed by: FAMILY MEDICINE

## 2024-02-22 PROCEDURE — 90651 9VHPV VACCINE 2/3 DOSE IM: CPT | Performed by: FAMILY MEDICINE

## 2024-02-22 PROCEDURE — 82306 VITAMIN D 25 HYDROXY: CPT | Performed by: FAMILY MEDICINE

## 2024-02-22 PROCEDURE — 83036 HEMOGLOBIN GLYCOSYLATED A1C: CPT | Performed by: FAMILY MEDICINE

## 2024-02-22 RX ORDER — ZOLPIDEM TARTRATE 5 MG/1
5 TABLET ORAL
Qty: 14 TABLET | Refills: 0 | Status: SHIPPED | OUTPATIENT
Start: 2024-02-22 | End: 2024-07-23

## 2024-02-22 RX ORDER — LISINOPRIL 5 MG/1
5 TABLET ORAL DAILY
Qty: 90 TABLET | Refills: 4 | Status: SHIPPED | OUTPATIENT
Start: 2024-02-22 | End: 2024-06-04

## 2024-02-22 RX ORDER — ZOLPIDEM TARTRATE 5 MG/1
5 TABLET ORAL
Qty: 14 TABLET | Refills: 0 | Status: SHIPPED | OUTPATIENT
Start: 2024-02-22 | End: 2024-02-22

## 2024-02-22 ASSESSMENT — ANXIETY QUESTIONNAIRES
7. FEELING AFRAID AS IF SOMETHING AWFUL MIGHT HAPPEN: NOT AT ALL
4. TROUBLE RELAXING: SEVERAL DAYS
5. BEING SO RESTLESS THAT IT IS HARD TO SIT STILL: NOT AT ALL
3. WORRYING TOO MUCH ABOUT DIFFERENT THINGS: SEVERAL DAYS
GAD7 TOTAL SCORE: 4
IF YOU CHECKED OFF ANY PROBLEMS ON THIS QUESTIONNAIRE, HOW DIFFICULT HAVE THESE PROBLEMS MADE IT FOR YOU TO DO YOUR WORK, TAKE CARE OF THINGS AT HOME, OR GET ALONG WITH OTHER PEOPLE: SOMEWHAT DIFFICULT
7. FEELING AFRAID AS IF SOMETHING AWFUL MIGHT HAPPEN: NOT AT ALL
2. NOT BEING ABLE TO STOP OR CONTROL WORRYING: SEVERAL DAYS
6. BECOMING EASILY ANNOYED OR IRRITABLE: SEVERAL DAYS
1. FEELING NERVOUS, ANXIOUS, OR ON EDGE: NOT AT ALL
8. IF YOU CHECKED OFF ANY PROBLEMS, HOW DIFFICULT HAVE THESE MADE IT FOR YOU TO DO YOUR WORK, TAKE CARE OF THINGS AT HOME, OR GET ALONG WITH OTHER PEOPLE?: SOMEWHAT DIFFICULT
GAD7 TOTAL SCORE: 4
GAD7 TOTAL SCORE: 4

## 2024-02-22 ASSESSMENT — ASTHMA QUESTIONNAIRES
QUESTION_5 LAST FOUR WEEKS HOW WOULD YOU RATE YOUR ASTHMA CONTROL: WELL CONTROLLED
QUESTION_2 LAST FOUR WEEKS HOW OFTEN HAVE YOU HAD SHORTNESS OF BREATH: MORE THAN ONCE A DAY
ACT_TOTALSCORE: 16
ACT_TOTALSCORE: 18
QUESTION_3 LAST FOUR WEEKS HOW OFTEN DID YOUR ASTHMA SYMPTOMS (WHEEZING, COUGHING, SHORTNESS OF BREATH, CHEST TIGHTNESS OR PAIN) WAKE YOU UP AT NIGHT OR EARLIER THAN USUAL IN THE MORNING: NOT AT ALL
QUESTION_1 LAST FOUR WEEKS HOW MUCH OF THE TIME DID YOUR ASTHMA KEEP YOU FROM GETTING AS MUCH DONE AT WORK, SCHOOL OR AT HOME: ALL OF THE TIME
QUESTION_4 LAST FOUR WEEKS HOW OFTEN HAVE YOU USED YOUR RESCUE INHALER OR NEBULIZER MEDICATION (SUCH AS ALBUTEROL): NOT AT ALL

## 2024-02-22 ASSESSMENT — PATIENT HEALTH QUESTIONNAIRE - PHQ9
SUM OF ALL RESPONSES TO PHQ QUESTIONS 1-9: 6
10. IF YOU CHECKED OFF ANY PROBLEMS, HOW DIFFICULT HAVE THESE PROBLEMS MADE IT FOR YOU TO DO YOUR WORK, TAKE CARE OF THINGS AT HOME, OR GET ALONG WITH OTHER PEOPLE: SOMEWHAT DIFFICULT
SUM OF ALL RESPONSES TO PHQ QUESTIONS 1-9: 6

## 2024-02-22 NOTE — PROGRESS NOTES
"Office Visit  [facility]  Date of Service: 2024      Agnes Doshi is a 38 year old female who presents for   Chief Complaint   Patient presents with    Recheck Medication    Consult       Mother was buried 24, she was in [place]     Mom . Just got back from  in [place].  It's been really hard. Exhausted. Can't focus at work.  Mom had ALS - diagnosed 2 years ago,  at 75 years old.  Overwhelmed, irritated.  Sleep - not at all or too much.  It was traumatizing.   Dad  in a February 10 years ago.        2022     2:22 PM 10/7/2022     7:54 AM 2024     3:45 PM   PHQ   PHQ-9 Total Score 0 0 6   Q9: Thoughts of better off dead/self-harm past 2 weeks Not at all Not at all Not at all         Objective   /75 (BP Location: Left arm, Patient Position: Sitting, Cuff Size: Adult Large)   Pulse 100   Temp 97.1  F (36.2  C) (Temporal)   Resp 20   Ht 1.613 m (5' 3.5\")   Wt 119.7 kg (264 lb)   LMP  (LMP Unknown)   SpO2 96%   BMI 46.03 kg/m   She reports that she has been smoking cigarettes, cigarettes, and cigarettes. She has a 2.5 pack-year smoking history. She has never used smokeless tobacco.  Wt Readings from Last 10 Encounters:   24 119.7 kg (264 lb)   10/07/22 115.2 kg (254 lb)   21 121.6 kg (268 lb)   19 119.7 kg (264 lb)   09 86.2 kg (190 lb)       Gen: Alert, no apparent distress.    Results for orders placed or performed in visit on 24   Hemoglobin A1c     Status: Abnormal   Result Value Ref Range    Hemoglobin A1C 7.0 (H) 0.0 - 5.6 %   CBC with platelets     Status: Abnormal   Result Value Ref Range    WBC Count 7.6 4.0 - 11.0 10e3/uL    RBC Count 5.38 (H) 3.80 - 5.20 10e6/uL    Hemoglobin 15.0 11.7 - 15.7 g/dL    Hematocrit 45.9 35.0 - 47.0 %    MCV 85 78 - 100 fL    MCH 27.9 26.5 - 33.0 pg    MCHC 32.7 31.5 - 36.5 g/dL    RDW 13.3 10.0 - 15.0 %    Platelet Count 255 150 - 450 10e3/uL   Albumin " Random Urine Quantitative with Creat Ratio     Status: Abnormal   Result Value Ref Range    Creatinine Urine mg/dL 101.0 mg/dL    Albumin Urine mg/L 30.2 mg/L    Albumin Urine mg/g Cr 29.90 (H) 0.00 - 25.00 mg/g Cr   TSH with free T4 reflex     Status: Normal   Result Value Ref Range    TSH 1.28 0.30 - 4.20 uIU/mL   Lipid panel reflex to direct LDL Fasting     Status: Abnormal   Result Value Ref Range    Cholesterol 210 (H) <200 mg/dL    Triglycerides 254 (H) <150 mg/dL    Direct Measure HDL 41 (L) >=50 mg/dL    LDL Cholesterol Calculated 118 (H) <=100 mg/dL    Non HDL Cholesterol 169 (H) <130 mg/dL    Patient Fasting > 8hrs? Unknown     Narrative    Cholesterol  Desirable:  <200 mg/dL    Triglycerides  Normal:  Less than 150 mg/dL  Borderline High:  150-199 mg/dL  High:  200-499 mg/dL  Very High:  Greater than or equal to 500 mg/dL    Direct Measure HDL  Female:  Greater than or equal to 50 mg/dL   Male:  Greater than or equal to 40 mg/dL    LDL Cholesterol  Desirable:  <100mg/dL  Above Desirable:  100-129 mg/dL   Borderline High:  130-159 mg/dL   High:  160-189 mg/dL   Very High:  >= 190 mg/dL    Non HDL Cholesterol  Desirable:  130 mg/dL  Above Desirable:  130-159 mg/dL  Borderline High:  160-189 mg/dL  High:  190-219 mg/dL  Very High:  Greater than or equal to 220 mg/dL   Comprehensive metabolic panel     Status: Abnormal   Result Value Ref Range    Sodium 138 135 - 145 mmol/L    Potassium 4.2 3.4 - 5.3 mmol/L    Carbon Dioxide (CO2) 25 22 - 29 mmol/L    Anion Gap 10 7 - 15 mmol/L    Urea Nitrogen 18.1 6.0 - 20.0 mg/dL    Creatinine 0.74 0.51 - 0.95 mg/dL    GFR Estimate >90 >60 mL/min/1.73m2    Calcium 9.5 8.6 - 10.0 mg/dL    Chloride 103 98 - 107 mmol/L    Glucose 241 (H) 70 - 99 mg/dL    Alkaline Phosphatase 62 40 - 150 U/L    AST 18 0 - 45 U/L    ALT 27 0 - 50 U/L    Protein Total 7.2 6.4 - 8.3 g/dL    Albumin 4.2 3.5 - 5.2 g/dL    Bilirubin Total 0.4 <=1.2 mg/dL   Vitamin D Deficiency     Status: Abnormal    Result Value Ref Range    Vitamin D, Total (25-Hydroxy) 16 (L) 20 - 50 ng/mL    Narrative    Season, race, dietary intake, and treatment affect the concentration of 25-hydroxy-Vitamin D. Values may decrease during winter months and increase during summer months.    Vitamin D determination is routinely performed by an immunoassay specific for 25 hydroxyvitamin D3.  If an individual is on vitamin D2(ergocalciferol) supplementation, please specify 25 OH vitamin D2 and D3 level determination by LCMSMS test VITD23.     Vitamin B12     Status: Normal   Result Value Ref Range    Vitamin B12 861 232 - 1,245 pg/mL   Ferritin     Status: Normal   Result Value Ref Range    Ferritin 61 6 - 175 ng/mL   Hepatitis B surface antigen     Status: Abnormal   Result Value Ref Range    Hepatitis B Surface Antigen Reactive (A) Nonreactive   Hepatitis A Antibody Total     Status: None   Result Value Ref Range    Hepatitis A Antibody Total Reactive     Narrative    HAV antibody testing interpretation chart:      HAV Total Antibody - NONREACTIVE  HAV IgM - NOT TESTED  Comments: No evidence of vaccination or previous infection; Susceptible to Hepatitis A infection     HAV Total Antibody - REACTIVE   HAV IgM - NOT TESTED  Comments:Consistent with recent or remote Hepatitis A infection or antibody response to HAV vaccination    HAV Total Antibody - REACTIVE  HAV IgM - NONREACTIVE  Comments:Consistent with resolved Hepatitis A infection or antibody response to HAV vaccination    HAV Total Antibody - REACTIVE  HAV IgM - REACTIVE  Comments:Consistent with active Hepatitis A infection   Hepatitis delta antibody     Status: None   Result Value Ref Range    Hepatitis Delta Antibody Negative Negative     Assessment & Plan     Type 2 diabetes.    Hemoglobin A1c 7.0.  Has microalbuminuria, no neuropathy.  No known retinopathy.  Current meds: Bydureon, metformin.  Continue current medications.  Discussed diet and exercise.  Iron deficiency anemia.   Hemoglobin now 15.  Chronic kidney disease stage II with microalbuminuria, GFR greater than 90.    Target blood pressure less than 130/80, current blood pressure 119/75  Dyslipidemia.  Not on statin due to planning pregnancy.  Hemoglobin A1c goal less than 7.0, currently 7.0.  Work on diet and exercise.  Lisinopril 5 mg daily.  Chronic hepatitis B.    E antigen negative, DNA less than 2000 IU/mL, normal ALT.  Hepatitis D negative.  Hepatitis A immune, hepatitis C negative.  Does not use alcohol.          Order Summary                                                      Type 2 diabetes mellitus  (H)  -     Hemoglobin A1c; Future  -     Albumin Random Urine Quantitative with Creat Ratio; Future  -     TSH with free T4 reflex; Future  -     Hemoglobin A1c  -     Albumin Random Urine Quantitative with Creat Ratio  -     TSH with free T4 reflex  -     exenatide ER (BYDUREON BCISE) 2 MG/0.85ML auto-injector; Inject 2 mg Subcutaneous every 7 days for 28 days    Cervical cancer screening    Endometrial hyperplasia, complex    Iron deficiency anemia due to chronic blood loss  -     CBC with platelets; Future  -     Vitamin B12; Future  -     Ferritin; Future  -     CBC with platelets  -     Vitamin B12  -     Ferritin    CKD (chronic kidney disease) stage 1, GFR 90 ml/min or greater  -     Comprehensive metabolic panel; Future  -     Comprehensive metabolic panel  -     lisinopril (ZESTRIL) 5 MG tablet; Take 1 tablet (5 mg) by mouth daily    Chronic hepatitis B (H)  -     Comprehensive metabolic panel; Future  -     Comprehensive metabolic panel    Vitamin D deficiency  -     Vitamin D Deficiency; Future  -     Vitamin D Deficiency  -     Vitamin D, Cholecalciferol, 25 MCG (1000 UT) CAPS; Take 1,000 Units by mouth daily    Low serum high density lipoprotein (HDL)  -     Lipid panel reflex to direct LDL Fasting; Future  -     Lipid panel reflex to direct LDL Fasting    Medication management  -     Vitamin B12; Future  -      Vitamin B12    Chronic viral hepatitis B without delta agent and without coma (H)  -     Hepatitis B surface antigen; Future  -     Hepatitis A Antibody Total; Future  -     Hepatitis B surface antigen  -     Hepatitis A Antibody Total    Hepatitis B surface antigen positive  -     Hepatitis A Antibody Total; Future  -     Hepatitis delta antibody; Future  -     Hepatitis A Antibody Total  -     Hepatitis delta antibody    Grief  -     Adult Mental Health  Referral; Future  -     zolpidem (AMBIEN) 5 MG tablet; Take 1 tablet (5 mg) by mouth nightly as needed for sleep    Body mass index (BMI) of 45.0-49.9 in adult (H)  -     exenatide ER (BYDUREON BCISE) 2 MG/0.85ML auto-injector; Inject 2 mg Subcutaneous every 7 days for 28 days  -     Adult Comprehensive Weight Management  Referral; Future    Other orders  -     HPV9 (GARDASIL 9)  -     COVID-19 12+ (2023-24) (PFIZER)  -     INFLUENZA VACCINE >6 MONTHS (AFLURIA/FLUZONE)        Immunizations Administered       Name Date Dose VIS Date Route    COVID-19 12+ (2023-24) (Pfizer) 2/22/24  5:36 PM 0.3 mL EUI,10/19/2023,Given today Intramuscular    HPV9 2/22/24  5:37 PM 0.5 mL 08/06/2021, Given Today Intramuscular    Hepatitis A Immunity: Titer/MD Dx 2/22/24 -- -- --    INFLUENZA VACCINE >6 MONTHS, QUAD,PF 2/22/24  5:37 PM 0.5 mL 08/06/2021, Given Today Intramuscular            No future appointments.    Completed by: Abril Martin M.D., [facility]. 2/22/2024 4:49 PM.  This transcription uses voice recognition software, which may contain typographical errors.  MDM: [facility] neighborhood: [place] [address], language: English,.  Answers submitted by the patient for this visit:  Patient Health Questionnaire (Submitted on 2/22/2024)  If you checked off any problems, how difficult have these problems made it for you to do your work, take care of things at home, or get along with other people?: Somewhat difficult  PHQ9 TOTAL SCORE: 6  NICHOL-7  (Submitted on 2/22/2024)  NICHOL 7 TOTAL SCORE: 4  Diabetes Visit (Submitted on 2/22/2024)  Chief Complaint: Chronic problems general questions HPI Form  Frequency of checking blood sugars:: a few times a month  What time of day are you checking your blood sugars : at bedtime  Have you had any blood sugars above 200?: No  Have you had any blood sugars below 70?: No  Hypoglycemia symptoms:: shakiness, dizziness, weakness  Diabetic concerns:: blood sugar frequently over 200  Paraesthesia present:: numbness in feet, excessive thirst, weight loss, weight gain  Have you had a diabetic eye exam within the last year?: No  Depression / Anxiety Questionnaire (Submitted on 2/22/2024)  Chief Complaint: Chronic problems general questions HPI Form  Depression/Anxiety: Depression & Anxiety  Depression & Anxiety (Submitted on 2/22/2024)  Chief Complaint: Chronic problems general questions HPI Form  Status since last visit:: medium  Anxiety since last: : medium  Other associated symptoms of depression:: No  Other associated symotome: : No  Significant life event: : financial concerns, other  Anxious:: No  Current substance use:: No  General Questionnaire (Submitted on 2/22/2024)  Chief Complaint: Chronic problems general questions HPI Form  How many servings of fruits and vegetables do you eat daily?: 2-3  On average, how many sweetened beverages do you drink each day (Examples: soda, juice, sweet tea, etc.  Do NOT count diet or artificially sweetened beverages)?: 0  How many minutes a day do you exercise enough to make your heart beat faster?: 9 or less  How many days a week do you exercise enough to make your heart beat faster?: 3 or less  How many days per week do you miss taking your medication?: 2

## 2024-02-22 NOTE — PATIENT INSTRUCTIONS
Stage 1 Chronic Kidney Disease  Normal kidney function + protein in the urine    Diabetes - goal A1c less than 7.  Today it is 7.    Blood pressure - goal less than 130/80  Today it is 119/75.    Use ACE inhibitor to prevent futher kidney damage  Lisinopril 5 mg.    Cholesterol - goal LDL less than 130  Result pending.    Avoid - ibuprofen, naproxen.  Drink a lot of water.

## 2024-02-23 LAB
ALBUMIN SERPL BCG-MCNC: 4.2 G/DL (ref 3.5–5.2)
ALP SERPL-CCNC: 62 U/L (ref 40–150)
ALT SERPL W P-5'-P-CCNC: 27 U/L (ref 0–50)
ANION GAP SERPL CALCULATED.3IONS-SCNC: 10 MMOL/L (ref 7–15)
AST SERPL W P-5'-P-CCNC: 18 U/L (ref 0–45)
BILIRUB SERPL-MCNC: 0.4 MG/DL
BUN SERPL-MCNC: 18.1 MG/DL (ref 6–20)
CALCIUM SERPL-MCNC: 9.5 MG/DL (ref 8.6–10)
CHLORIDE SERPL-SCNC: 103 MMOL/L (ref 98–107)
CHOLEST SERPL-MCNC: 210 MG/DL
CREAT SERPL-MCNC: 0.74 MG/DL (ref 0.51–0.95)
DEPRECATED HCO3 PLAS-SCNC: 25 MMOL/L (ref 22–29)
EGFRCR SERPLBLD CKD-EPI 2021: >90 ML/MIN/1.73M2
FASTING STATUS PATIENT QL REPORTED: ABNORMAL
FERRITIN SERPL-MCNC: 61 NG/ML (ref 6–175)
GLUCOSE SERPL-MCNC: 241 MG/DL (ref 70–99)
HAV AB SER QL IA: REACTIVE
HBV SURFACE AG SERPL QL IA: REACTIVE
HDLC SERPL-MCNC: 41 MG/DL
LDLC SERPL CALC-MCNC: 118 MG/DL
NONHDLC SERPL-MCNC: 169 MG/DL
POTASSIUM SERPL-SCNC: 4.2 MMOL/L (ref 3.4–5.3)
PROT SERPL-MCNC: 7.2 G/DL (ref 6.4–8.3)
SODIUM SERPL-SCNC: 138 MMOL/L (ref 135–145)
TRIGL SERPL-MCNC: 254 MG/DL
TSH SERPL DL<=0.005 MIU/L-ACNC: 1.28 UIU/ML (ref 0.3–4.2)
VIT B12 SERPL-MCNC: 861 PG/ML (ref 232–1245)
VIT D+METAB SERPL-MCNC: 16 NG/ML (ref 20–50)

## 2024-02-23 RX ORDER — FAMOTIDINE 20 MG
1000 TABLET ORAL DAILY
Qty: 100 CAPSULE | Refills: 4 | Status: SHIPPED | OUTPATIENT
Start: 2024-02-23 | End: 2024-07-23

## 2024-02-25 ENCOUNTER — MYC MEDICAL ADVICE (OUTPATIENT)
Dept: INTERNAL MEDICINE | Facility: CLINIC | Age: 39
End: 2024-02-25
Payer: COMMERCIAL

## 2024-02-27 ENCOUNTER — ALLIED HEALTH/NURSE VISIT (OUTPATIENT)
Dept: FAMILY MEDICINE | Facility: CLINIC | Age: 39
End: 2024-02-27
Payer: COMMERCIAL

## 2024-02-27 ENCOUNTER — TELEPHONE (OUTPATIENT)
Dept: FAMILY MEDICINE | Facility: CLINIC | Age: 39
End: 2024-02-27

## 2024-02-27 ENCOUNTER — NURSE TRIAGE (OUTPATIENT)
Dept: NURSING | Facility: CLINIC | Age: 39
End: 2024-02-27

## 2024-02-27 DIAGNOSIS — R80.9 TYPE 2 DIABETES MELLITUS WITH MICROALBUMINURIA, WITHOUT LONG-TERM CURRENT USE OF INSULIN (H): Primary | ICD-10-CM

## 2024-02-27 DIAGNOSIS — E11.29 TYPE 2 DIABETES MELLITUS WITH MICROALBUMINURIA, WITHOUT LONG-TERM CURRENT USE OF INSULIN (H): Primary | ICD-10-CM

## 2024-02-27 PROCEDURE — 99207 PR NO CHARGE NURSE ONLY: CPT

## 2024-02-27 RX ORDER — GLUCOSAMINE HCL/CHONDROITIN SU 500-400 MG
CAPSULE ORAL
Qty: 100 EACH | Refills: 11 | Status: SHIPPED | OUTPATIENT
Start: 2024-02-27 | End: 2024-08-21

## 2024-02-27 RX ORDER — CONTAINER,EMPTY
1 EACH MISCELLANEOUS PRN
Qty: 1 EACH | Refills: 1 | Status: SHIPPED | OUTPATIENT
Start: 2024-02-27 | End: 2024-05-02

## 2024-02-27 NOTE — TELEPHONE ENCOUNTER
Patient added to RN schedule today (2/27 11:00am) for education on subcutaneous self-injection for Bydureon RX. Patient is aware RN can only provide education/demonstrate injection and cannot administer home medication in clinic.

## 2024-02-27 NOTE — TELEPHONE ENCOUNTER
Patient presented to clinic for RN education of subcutaneous auto-injector use. Patient would like an order for sharps container and alcohol wipes sent to her pharmacy. Orders pended, please sign if appropriate.

## 2024-02-27 NOTE — PROGRESS NOTES
"Patient presented to clinic requesting education on use of Bydureon Bcise subcutaneous auto-injector. RN explained to patient that we can provide education and demonstrate use of auto-injector, but RN cannot administer home medication to patient in the clinic. Patient understands.    Reviewed proper hand hygiene and infection prevention with patient.  Viewed \"How to Inject Bydreon Bcise\" video from  website (www.bydureon.com).   Used teach-back method to verify patient understanding.    Provided written \"Steps for Use\" instructions from  website for patient to reference at home.    Patient verbalizes understanding of auto-injection process, she will call or return to clinic with any additional questions/concerns.  "

## 2024-02-27 NOTE — TELEPHONE ENCOUNTER
Pt calling with questions about;    States she is has never had a medication that she has to 'stick into herself - I've never done this before'     Pt asking if she can make a nurse only appt because she would like to have someone show her exactly how to do it this first time because not comfortable just hearing instructions over the phone.    Pt states she works nights so will be home until this afternoon.  Requesting a callback about coming in today for nurse only appt.    Please call to advise Pt at [phone] (a detailed msg may be left on ).  Pt will await call.      Reason for Disposition   Caller has NON-URGENT medicine question about med that PCP or specialist prescribed and triager unable to answer question    Additional Information   Negative: Intentional drug overdose and suicidal thoughts or ideas   Negative: Drug overdose and triager unable to answer question   Negative: Caller requesting a renewal or refill of a medicine patient is currently taking   Negative: Caller requesting information unrelated to medicine   Negative: Caller requesting information about COVID-19 Vaccine   Negative: Caller requesting information about Emergency Contraception   Negative: Caller requesting information about Combined Birth Control Pills   Negative: Caller requesting information about Progestin Birth Control Pills   Negative: Caller requesting information about Post-Op pain or medicines   Negative: Caller requesting a prescription antibiotic (such as penicillin) for Strep throat and has a positive culture result   Negative: Caller requesting a prescription anti-viral med (such as Tamiflu) and has influenza (flu) symptoms   Negative: Immunization reaction suspected   Negative: Rash while taking a medicine or within 3 days of stopping it   Negative: Asthma and having symptoms of asthma (cough, wheezing, etc.)   Negative: Symptom of illness (e.g., headache, abdominal pain, earache, vomiting) that are more than mild    Negative: Breastfeeding questions about mother's medicines and diet   Negative: MORE THAN A DOUBLE DOSE of a prescription or over-the-counter (OTC) drug   Negative: DOUBLE DOSE (an extra dose or lesser amount) of prescription drug and any symptoms (e.g., dizziness, nausea, pain, sleepiness)   Negative: DOUBLE DOSE (an extra dose or lesser amount) of over-the-counter (OTC) drug and any symptoms (e.g., dizziness, nausea, pain, sleepiness)   Negative: Took another person's prescription drug   Negative: DOUBLE DOSE (an extra dose or lesser amount) of prescription drug and NO symptoms  (Exception: A double dose of antibiotics.)   Negative: Diabetes drug error or overdose (e.g., took wrong type of insulin or took extra dose)   Negative: Caller has medication question about med NOT prescribed by PCP and triager unable to answer question (e.g., compatibility with other med, storage)   Negative: Prescription not at pharmacy and was prescribed by PCP recently  (Exception: triager has access to EMR and prescription is recorded there. Go to Home Care and confirm for pharmacy.)   Negative: Pharmacy calling with prescription question and triager unable to answer question   Negative: Caller has URGENT medicine question about med that PCP or specialist prescribed and triager unable to answer question    Protocols used: Medication Question Call-A-OH

## 2024-02-27 NOTE — TELEPHONE ENCOUNTER
Orders sent.    Also let her know, in case she ever needs a sharps container and isn't at home, you can use an empty pop bottle or gatorade bottle as a sharps container in a pinch.

## 2024-02-28 LAB — HDV AB SER QL IA: NEGATIVE

## 2024-02-28 NOTE — TELEPHONE ENCOUNTER
Form placed in your in-basket. Notified patient via [employer] that she needs to sign form before submission.

## 2024-03-05 NOTE — LETTER
March 5, 2024  Re: Monica Doshi  YOB: 1985    Dear Colleague,    Thank you for your referral to the [facility] [place]. We have been unable to schedule the referral after several contact attempts.      If you have any questions or concerns, please contact our office at Dept: [phone].        Sincerely,  [facility]

## 2024-03-06 NOTE — PROGRESS NOTES
LMTCB #1, if pt calls back please help pt schedule an appt. as mention. Will call back tomorrow, thanks!

## 2024-03-07 NOTE — PROGRESS NOTES
LMTCB #2, if pt calls back please help pt schedule an appt. as mention. Will call back tomorrow, thanks!

## 2024-03-22 NOTE — TELEPHONE ENCOUNTER
I completed the form for unemployment insurance. It's in my out box.    Health Maintenance Due   Topic Date Due    DIABETIC FOOT EXAM  Never done    DEPRESSION ACTION PLAN  Never done    Pneumococcal Vaccine: Pediatrics (0 to 5 Years) and At-Risk Patients (6 to 64 Years) (1 of 2 - PCV) Never done    PAP  05/21/2017    DTAP/TDAP/TD IMMUNIZATION (2 - Td or Tdap) 09/17/2019    NICOTINE/TOBACCO CESSATION COUNSELING Q 1 YR  04/30/2022    YEARLY PREVENTIVE VISIT  04/30/2022    ASTHMA ACTION PLAN  10/07/2023    EYE EXAM  02/16/2024     BP Readings from Last 3 Encounters:   02/22/24 119/75   10/07/22 128/89   04/30/21 132/84

## 2024-05-02 ENCOUNTER — MYC REFILL (OUTPATIENT)
Dept: FAMILY MEDICINE | Facility: CLINIC | Age: 39
End: 2024-05-02
Payer: MEDICAID

## 2024-05-02 DIAGNOSIS — R80.9 TYPE 2 DIABETES MELLITUS WITH MICROALBUMINURIA, WITHOUT LONG-TERM CURRENT USE OF INSULIN (H): ICD-10-CM

## 2024-05-02 DIAGNOSIS — E11.29 TYPE 2 DIABETES MELLITUS WITH MICROALBUMINURIA, WITHOUT LONG-TERM CURRENT USE OF INSULIN (H): ICD-10-CM

## 2024-05-02 DIAGNOSIS — F33.42 MAJOR DEPRESSIVE DISORDER, RECURRENT, IN FULL REMISSION (H): ICD-10-CM

## 2024-05-03 RX ORDER — CONTAINER,EMPTY
1 EACH MISCELLANEOUS PRN
Qty: 1 EACH | Refills: 1 | Status: SHIPPED | OUTPATIENT
Start: 2024-05-03 | End: 2024-06-04

## 2024-05-03 RX ORDER — EXENATIDE 2 MG/.85ML
2 INJECTION, SUSPENSION, EXTENDED RELEASE SUBCUTANEOUS
Qty: 3.4 ML | Refills: 0 | Status: SHIPPED | OUTPATIENT
Start: 2024-05-03 | End: 2024-05-10

## 2024-05-03 RX ORDER — TRAZODONE HYDROCHLORIDE 100 MG/1
100 TABLET ORAL AT BEDTIME
Qty: 30 TABLET | Refills: 0 | OUTPATIENT
Start: 2024-05-03

## 2024-05-03 RX ORDER — TRAZODONE HYDROCHLORIDE 100 MG/1
100 TABLET ORAL AT BEDTIME
Qty: 90 TABLET | Refills: 2 | Status: SHIPPED | OUTPATIENT
Start: 2024-05-03 | End: 2024-05-10

## 2024-05-03 RX ORDER — FLUOXETINE 40 MG/1
40 CAPSULE ORAL DAILY
Qty: 90 CAPSULE | Refills: 0 | Status: SHIPPED | OUTPATIENT
Start: 2024-05-03 | End: 2024-05-10

## 2024-05-03 NOTE — TELEPHONE ENCOUNTER
Future Appointments   Date Time Provider Department Center   5/10/2024 11:00 AM Abril Martin MD [facility]      Health Maintenance Due   Topic Date Due    DIABETIC FOOT EXAM  Never done    DEPRESSION ACTION PLAN  Never done    Pneumococcal Vaccine: Pediatrics (0 to 5 Years) and At-Risk Patients (6 to 64 Years) (1 of 2 - PCV) Never done    PAP  05/21/2017    DTAP/TDAP/TD IMMUNIZATION (2 - Td or Tdap) 09/17/2019    NICOTINE/TOBACCO CESSATION COUNSELING Q 1 YR  04/30/2022    YEARLY PREVENTIVE VISIT  04/30/2022    ASTHMA ACTION PLAN  10/07/2023    EYE EXAM  02/16/2024     BP Readings from Last 3 Encounters:   02/22/24 119/75   10/07/22 128/89   04/30/21 132/84     Monica was seen today for medication refill.    Diagnoses and all orders for this visit:    Type 2 diabetes mellitus with microalbuminuria, without long-term current use of insulin (H)  -     BYDUREON BCISE 2 MG/0.85ML auto-injector; INJECT 2 MG SUBCUTANEOUSLY EVERY 7 DAYS    Body mass index (BMI) of 45.0-49.9 in adult (H)  -     BYDUREON BCISE 2 MG/0.85ML auto-injector; INJECT 2 MG SUBCUTANEOUSLY EVERY 7 DAYS    Major depressive disorder, recurrent, in full remission (H24)  -     traZODone (DESYREL) 100 MG tablet; Take 1 tablet by mouth at bedtime.

## 2024-05-10 ENCOUNTER — OFFICE VISIT (OUTPATIENT)
Dept: FAMILY MEDICINE | Facility: CLINIC | Age: 39
End: 2024-05-10
Payer: MEDICAID

## 2024-05-10 DIAGNOSIS — T78.1XXA POLLEN-FOOD ALLERGY, INITIAL ENCOUNTER: ICD-10-CM

## 2024-05-10 DIAGNOSIS — F33.42 MAJOR DEPRESSIVE DISORDER, RECURRENT, IN FULL REMISSION (H): ICD-10-CM

## 2024-05-10 DIAGNOSIS — N92.4 EXCESSIVE BLEEDING IN PREMENOPAUSAL PERIOD: ICD-10-CM

## 2024-05-10 DIAGNOSIS — N85.01 ENDOMETRIAL HYPERPLASIA, COMPLEX: ICD-10-CM

## 2024-05-10 DIAGNOSIS — R80.9 TYPE 2 DIABETES MELLITUS WITH MICROALBUMINURIA, WITHOUT LONG-TERM CURRENT USE OF INSULIN (H): ICD-10-CM

## 2024-05-10 DIAGNOSIS — E11.29 TYPE 2 DIABETES MELLITUS WITH MICROALBUMINURIA, WITHOUT LONG-TERM CURRENT USE OF INSULIN (H): ICD-10-CM

## 2024-05-10 DIAGNOSIS — T78.40XA ALLERGY, INITIAL ENCOUNTER: ICD-10-CM

## 2024-05-10 DIAGNOSIS — Z12.4 CERVICAL CANCER SCREENING: ICD-10-CM

## 2024-05-10 DIAGNOSIS — Z00.00 ROUTINE GENERAL MEDICAL EXAMINATION AT A HEALTH CARE FACILITY: Primary | ICD-10-CM

## 2024-05-10 LAB
ERYTHROCYTE [DISTWIDTH] IN BLOOD BY AUTOMATED COUNT: 13.4 % (ref 10–15)
HBA1C MFR BLD: 6.5 % (ref 0–5.6)
HCT VFR BLD AUTO: 45.9 % (ref 35–47)
HGB BLD-MCNC: 15.1 G/DL (ref 11.7–15.7)
MCH RBC QN AUTO: 28 PG (ref 26.5–33)
MCHC RBC AUTO-ENTMCNC: 32.9 G/DL (ref 31.5–36.5)
MCV RBC AUTO: 85 FL (ref 78–100)
PLATELET # BLD AUTO: 268 10E3/UL (ref 150–450)
RBC # BLD AUTO: 5.4 10E6/UL (ref 3.8–5.2)
WBC # BLD AUTO: 8 10E3/UL (ref 4–11)

## 2024-05-10 PROCEDURE — 84443 ASSAY THYROID STIM HORMONE: CPT | Performed by: FAMILY MEDICINE

## 2024-05-10 PROCEDURE — 99000 SPECIMEN HANDLING OFFICE-LAB: CPT | Performed by: FAMILY MEDICINE

## 2024-05-10 PROCEDURE — 83036 HEMOGLOBIN GLYCOSYLATED A1C: CPT | Performed by: FAMILY MEDICINE

## 2024-05-10 PROCEDURE — 99395 PREV VISIT EST AGE 18-39: CPT | Performed by: FAMILY MEDICINE

## 2024-05-10 PROCEDURE — 36415 COLL VENOUS BLD VENIPUNCTURE: CPT | Performed by: FAMILY MEDICINE

## 2024-05-10 PROCEDURE — 82785 ASSAY OF IGE: CPT | Performed by: FAMILY MEDICINE

## 2024-05-10 PROCEDURE — 86003 ALLG SPEC IGE CRUDE XTRC EA: CPT | Performed by: FAMILY MEDICINE

## 2024-05-10 PROCEDURE — 99214 OFFICE O/P EST MOD 30 MIN: CPT | Mod: 25 | Performed by: FAMILY MEDICINE

## 2024-05-10 PROCEDURE — 85027 COMPLETE CBC AUTOMATED: CPT | Performed by: FAMILY MEDICINE

## 2024-05-10 RX ORDER — FLUOXETINE 40 MG/1
40 CAPSULE ORAL DAILY
Qty: 90 CAPSULE | Refills: 4 | Status: SHIPPED | OUTPATIENT
Start: 2024-05-10

## 2024-05-10 RX ORDER — TRAZODONE HYDROCHLORIDE 100 MG/1
100 TABLET ORAL AT BEDTIME
Qty: 90 TABLET | Refills: 4 | Status: SHIPPED | OUTPATIENT
Start: 2024-05-10 | End: 2024-06-04

## 2024-05-10 RX ORDER — EXENATIDE 2 MG/.85ML
2 INJECTION, SUSPENSION, EXTENDED RELEASE SUBCUTANEOUS
Qty: 3.4 ML | Refills: 4 | Status: SHIPPED | OUTPATIENT
Start: 2024-05-10 | End: 2024-07-23

## 2024-05-10 RX ORDER — EXENATIDE 2 MG/.85ML
2 INJECTION, SUSPENSION, EXTENDED RELEASE SUBCUTANEOUS
Qty: 3.4 ML | Refills: 0 | Status: SHIPPED | OUTPATIENT
Start: 2024-05-10 | End: 2024-05-10

## 2024-05-10 RX ORDER — CETIRIZINE HYDROCHLORIDE 10 MG/1
10 TABLET ORAL DAILY
Qty: 90 TABLET | Refills: 4 | Status: SHIPPED | OUTPATIENT
Start: 2024-05-10

## 2024-05-10 SDOH — HEALTH STABILITY: PHYSICAL HEALTH: ON AVERAGE, HOW MANY DAYS PER WEEK DO YOU ENGAGE IN MODERATE TO STRENUOUS EXERCISE (LIKE A BRISK WALK)?: 3 DAYS

## 2024-05-10 SDOH — HEALTH STABILITY: PHYSICAL HEALTH: ON AVERAGE, HOW MANY MINUTES DO YOU ENGAGE IN EXERCISE AT THIS LEVEL?: 60 MIN

## 2024-05-10 ASSESSMENT — ASTHMA QUESTIONNAIRES
QUESTION_3 LAST FOUR WEEKS HOW OFTEN DID YOUR ASTHMA SYMPTOMS (WHEEZING, COUGHING, SHORTNESS OF BREATH, CHEST TIGHTNESS OR PAIN) WAKE YOU UP AT NIGHT OR EARLIER THAN USUAL IN THE MORNING: NOT AT ALL
QUESTION_1 LAST FOUR WEEKS HOW MUCH OF THE TIME DID YOUR ASTHMA KEEP YOU FROM GETTING AS MUCH DONE AT WORK, SCHOOL OR AT HOME: ALL OF THE TIME
QUESTION_3 LAST FOUR WEEKS HOW OFTEN DID YOUR ASTHMA SYMPTOMS (WHEEZING, COUGHING, SHORTNESS OF BREATH, CHEST TIGHTNESS OR PAIN) WAKE YOU UP AT NIGHT OR EARLIER THAN USUAL IN THE MORNING: NOT AT ALL
ACT_TOTALSCORE: 20
QUESTION_5 LAST FOUR WEEKS HOW WOULD YOU RATE YOUR ASTHMA CONTROL: WELL CONTROLLED
QUESTION_2 LAST FOUR WEEKS HOW OFTEN HAVE YOU HAD SHORTNESS OF BREATH: NOT AT ALL
QUESTION_4 LAST FOUR WEEKS HOW OFTEN HAVE YOU USED YOUR RESCUE INHALER OR NEBULIZER MEDICATION (SUCH AS ALBUTEROL): NOT AT ALL
ACT_TOTALSCORE: 24
QUESTION_1 LAST FOUR WEEKS HOW MUCH OF THE TIME DID YOUR ASTHMA KEEP YOU FROM GETTING AS MUCH DONE AT WORK, SCHOOL OR AT HOME: NONE OF THE TIME
ACT_TOTALSCORE: 20
QUESTION_4 LAST FOUR WEEKS HOW OFTEN HAVE YOU USED YOUR RESCUE INHALER OR NEBULIZER MEDICATION (SUCH AS ALBUTEROL): NOT AT ALL
QUESTION_5 LAST FOUR WEEKS HOW WOULD YOU RATE YOUR ASTHMA CONTROL: WELL CONTROLLED
QUESTION_2 LAST FOUR WEEKS HOW OFTEN HAVE YOU HAD SHORTNESS OF BREATH: NOT AT ALL

## 2024-05-10 ASSESSMENT — PATIENT HEALTH QUESTIONNAIRE - PHQ9
SUM OF ALL RESPONSES TO PHQ QUESTIONS 1-9: 4
SUM OF ALL RESPONSES TO PHQ QUESTIONS 1-9: 4
10. IF YOU CHECKED OFF ANY PROBLEMS, HOW DIFFICULT HAVE THESE PROBLEMS MADE IT FOR YOU TO DO YOUR WORK, TAKE CARE OF THINGS AT HOME, OR GET ALONG WITH OTHER PEOPLE: SOMEWHAT DIFFICULT

## 2024-05-10 ASSESSMENT — SOCIAL DETERMINANTS OF HEALTH (SDOH): HOW OFTEN DO YOU GET TOGETHER WITH FRIENDS OR RELATIVES?: ONCE A WEEK

## 2024-05-10 NOTE — COMMUNITY RESOURCES LIST (ENGLISH)
May 10, 2024           YOUR PERSONALIZED LIST OF SERVICES & PROGRAMS           BENEFITS NAVIGATION    Benefits Eligibility Screening      [facility] application assistance [address] [address] (Distance: 1.2 miles)  Phone: [phone]  Language: English, Prydeinig  Fee: Free      [facility] application assistance  [address] [place] [address] (Distance: 1.2 miles)  Language: English  Fee: Free      [employer] [place] - SNAP (formerly food stamps) Screening and Application help  Phone: (118) [phone]  Website: https://www.hungerThe iProperty Group.org/programs/[place]-food-helpline/  Language: English  Hours: Mon 10:00 AM - 5:00 PM Tue 10:00 AM - 5:00 PM Wed 10:00 AM - 5:00 PM Thu 10:00 AM - 5:00 PM Fri 10:00 AM - 5:00 PM  Fee: Free  Accessibility: Ada accessible, Blind accommodation, Deaf or hard of hearing, Translation services        FOOD ASSISTANCE    SNAP/WIC/Other Nutrition Benefits      [facility] application assistance - [address] [address] (Distance: 1.2 miles)  Phone: [phone]  Language: English, Prydeinig  Fee: Free      [facility] application assistance  [address] [place] [address] (Distance: 1.2 miles)  Language: English  Fee: Free      [employer] [place] - SNAP (formerly food stamps) Screening and Application help  Phone: (182) [phone]  Website: https://www.hungerThe i[employer] Group.org/programs/[place]-food-helpline/  Language: English  Hours: Mon 10:00 AM - 5:00 PM Tue 10:00 AM - 5:00 PM Wed 10:00 AM - 5:00 PM Thu 10:00 AM - 5:00 PM Fri 10:00 AM - 5:00 PM  Fee: Free  Accessibility: Ada accessible, Blind accommodation, Deaf or hard of hearing, Translation services    Food Pantry      [employer] [facility] Food in the '[facility] at [facility]  [address] [place] [address]  [place] [address] (Distance: 13.1 miles)  Phone: (904) 107-2030  Website: https://www.goodinthehood.org/our-programs/feeding-the-future/food-in-the-walton/  Language: English  Fee: Free  Accessibility: Ada accessible      [facility] - Food pantry  [address] [place] [address] (Distance: 4.7 miles)  Phone: [phone]) [phone]  Website: http://www.TallahasseeAccess [employer]/  Language: English  Fee: Free      [facility] [facility] [place] [employer]  Phone: (152) [phone]  Website: www.Purple.zanda  Language: English, Marshallese  Hours: Mon 9:00 AM - 3:30 PM Tue 9:00 AM - 6:30 PM Wed 9:00 AM - 3:30 PM Thu 9:00 AM - 12:30 PM Fri 9:00 AM - 12:30 PM Sat 9:00 AM - 12:00 PM  Fee: Free        HOUSING & SHELTER    Housing Case Management      [facility] - Coordinated Access  [address] [place] [address] (Distance: 5.4 miles)  Phone: [phone]  Language: English  Fee: Free  Accessibility: Translation services, Ada accessible      [facility] - Housing search assistance  [address] [place] [address] (Distance: 5.1 miles)  Phone: [phone]  Language: English, Citizen of [place], Canadian, Hmong  Fee: Free  Accessibility: Ada accessible, Blind accommodation, Deaf or hard of hearing, Translation services      [employer] - Housing Stabilization Services  Phone: (802) 275-4738  Website: https://homebasemn.com/  Language: English  Hours: Mon 8:00 AM - 4:00 PM Tue 8:00 AM - 4:00 PM Wed 8:00 AM - 4:00 PM Thu 8:00 AM - 4:00 PM Fri 8:00 AM - 4:00 PM  Fee: Free  Accessibility: Blind accommodation, Deaf or hard of hearing  Transportation Options: Free transportation    Rent/Mortgage Payment Assistance      [employer] - Rent and mortgage payment assistance  [address] [place] [address] (Distance: 1.2 miles)  Language: English, Marshallese  Fee: Free  Accessibility: Ada  accessible      [facility] - Rental subsidies  [address] [place] [address] (Distance: 1.2 miles)  Website: http://www.thereF[facility] Medical Centerer.org  Language: English  Fee: Free      [employer] - Keep the Key  Phone: (608) 400-2292  Website: https://www.moj12-bwsgsinoowskvp.org/programs.html  Language: English  Hours: Mon 7:00 AM - 7:00 PM Tue 7:00 AM - 7:00 PM Wed 7:00 AM - 7:00 PM Thu 7:00 AM - 7:00 PM Fri 7:00 AM - 7:00 PM  Fee: Free    Housing Mediation & Eviction Prevention      [facility] Stabilization Services  [address] [place] [address] (Distance: 11.4 miles)  Phone: (377) 837-4676  Website: https://wwwCargoSense  Language: Divehi, English, Welsh  Fee: Insurance, Self pay      [facility] [employer] - Mortgage Foreclosure Prevention  [address] [place] [address] (Distance: 6.7 miles)  Phone: (979) 617-7382  Language: English  Fee: Free  Accessibility: Ada accessible      [employer] - Tenant Rights / Eviction Prevention  Website: https://Bronxlinemn.org/u-optm-nm-/  Language: English, Montenegrin               IMPORTANT NUMBERS & WEBSITES        Emergency Services  911  .   United Way  211 http://211unitedway.org  .   Poison Control  (228) 227-2613 http://mnpoison.org http://wisconsinpoison.org  .     Suicide and Crisis Lifeline  988 http://988lifeline.org  .   Childhelp National Child Abuse Hotline  384.922.6643 http://Childhelphotline.org   .   National Sexual Assault Hotline  (408) 463-7323 (HOPE) http://Rainn.org   .     National Runaway Safeline  (901) 506-1917 (RUNAWAY) http://1800runaSmart Planet Technologies.org  .   Pregnancy & Postpartum Support  Call/text 753-909-8137  [place]: http://ppsupportmn.org  [place]: http://psichapters.com/wi  .   Substance Abuse National Helpline (SAMA)  800622-HELP (1102) http://Findtreatment.gov   .                DISCLAIMER: These resources have been generated via the [employer]ot[employer] Platform.  [facility] does not endorse any service providers mentioned in this resource list. [employer]  does not guarantee that the services mentioned in this resource list will be available to you or will improve your health or wellness.    [id number] [facility]

## 2024-05-10 NOTE — PROGRESS NOTES
Preventive Care Visit  [facility]  Abril Martin MD, Family Medicine  May 10, 2024      Assessment & Plan     Routine general medical examination at a health care facility    Type 2 diabetes mellitus with microalbuminuria, without long-term current use of insulin (H)  Well-controlled A1c 6.5% on exenatide ER and metformin. No complications.  - HEMOGLOBIN A1C; Future  - exenatide ER (BYDUREON BCISE) 2 MG/0.85ML auto-injector; Inject 2 mg Subcutaneous every 7 days  - HEMOGLOBIN A1C    Body mass index (BMI) of 45.0-49.9 in adult (H)  - exenatide ER (BYDUREON BCISE) 2 MG/0.85ML auto-injector; Inject 2 mg Subcutaneous every 7 days    Cervical cancer screening  Will do pap at gyn    Excessive bleeding in premenopausal period  Endometrial hyperplasia, complex  Daily bleeding. Planning pregnancy.   - US Pelvic Complete with Transvaginal; Future  - CBC with platelets; Future  - TSH with free T4 reflex; Future  - Ob/Gyn  Referral; Future  - CBC with platelets  - TSH with free T4 reflex    Major depressive disorder, recurrent, in full remission (H24)  PHQ9 = 4. Would like to continue current meds.   - traZODone (DESYREL) 100 MG tablet; Take 1 tablet (100 mg) by mouth at bedtime  - FLUoxetine (PROZAC) 40 MG capsule; Take 1 capsule (40 mg) by mouth daily    Allergy, initial encounter  Pollen-food allergy, initial encounter  History suggestive of oral allergy syndrome.   - Allergen Cantalope  - Allergen banana IgE  - [facility] Allergen Panel  - Allergen Interpretation  - cetirizine (ZYRTEC) 10 MG tablet; Take 1 tablet (10 mg) by mouth daily    Nicotine/Tobacco Cessation  She reports that she has been smoking cigarettes, cigarettes, and cigarettes. She has a 2.5 pack-year smoking history. She has never used smokeless tobacco.      The longitudinal plan of care for the diagnosis(es)/condition(s) as documented were addressed during this visit. Due to the added complexity in care, I will  "continue to support Monica in the subsequent management and with ongoing continuity of care.   BMI  Estimated body mass index is 46.03 kg/m  as calculated from the following:    Height as of 24: 1.613 m (5' 3.5\").    Weight as of 24: 119.7 kg (264 lb).   Weight management plan: Discussed healthy diet and exercise guidelines    Counseling  Appropriate preventive services were discussed with this patient, including applicable screening as appropriate for fall prevention, nutrition, physical activity, Tobacco-use cessation, weight loss and cognition.  Checklist reviewing preventive services available has been given to the patient.        Subjective   Monica is a 38 year old, presenting for the following:  Diabetes and Recheck Medication        5/10/2024    10:41 AM   Additional Questions   Roomed by [facility] Care Directive  Patient does not have a Health Care Directive or Living Will: Discussed advance care planning with patient; information given to patient to review.    Grief  Mom  and still grieving  Has roommate, helps with companionship  Exercising more - 1 hour walk, 3 days a week    Concerned about menses  Bleeding heavily  Daily menses  On oral iron  Would like to get pregnant    Diabetes  Feeling better with bydureon  Prefers once a week shot to daily pill  No headaches  Sugars - not checking often, maybe 1-2 times a month. 120-150 before eating.       Wants kids, but might adopt/foster. OK to have kids partnered or unpartnered.        5/10/2024   General Health   How would you rate your overall physical health? (!) FAIR   Feel stress (tense, anxious, or unable to sleep) Only a little   (!) STRESS CONCERN      5/10/2024   Nutrition   Three or more servings of calcium each day? Yes   Diet: Diabetic   How many servings of fruit and vegetables per day? (!) 0-1   How many sweetened beverages each day? 0-1         5/10/2024   Exercise   Days per week of moderate/strenous exercise 3 days " "  Average minutes spent exercising at this level 60 min         5/10/2024   Social Factors   Frequency of gathering with friends or relatives Once a week   Worry food won't last until get money to buy more Yes   Food not last or not have enough money for food? Yes   Do you have housing?  Yes   Are you worried about losing your housing? Yes   Lack of transportation? No   Unable to get utilities (heat,electricity)? No   Want help with housing or utility concern? No   (!) FOOD SECURITY CONCERN PRESENT(!) HOUSING CONCERN PRESENT      5/10/2024   Dental   Dentist two times every year? (!) NO         5/10/2024   TB Screening   Were you born outside of the [place]? Yes       Today's PHQ-9 Score:       5/10/2024    10:38 AM   PHQ-9 SCORE   PHQ-9 Total Score MyChart 4 (Minimal depression)   PHQ-9 Total Score 4         5/10/2024   Substance Use   Alcohol more than 3/day or more than 7/wk No   Do you use any other substances recreationally? (!) CANNABIS PRODUCTS     Social History     Tobacco Use    Smoking status: Every Day     Current packs/day: 0.25     Average packs/day: 0.3 packs/day for 10.0 years (2.5 ttl pk-yrs)     Types: Cigarettes    Smokeless tobacco: Never   Substance Use Topics    Alcohol use: No    Drug use: Yes     Types: Marijuana     Comment: Drug use: h/o positive amphetamines on screen                5/10/2024   STI Screening   New sexual partner(s) since last STI/HIV test? No     History of abnormal Pap smear: No - age 30- 64 PAP with HPV every 5 years recommended             5/10/2024   Contraception/Family Planning   Questions about contraception or family planning (!) YES - heavy menses - considering unpartnered pregnancy        Reviewed and updated as needed this visit by Provider     Meds                     Objective    Exam  LMP  (LMP Unknown)    Estimated body mass index is 46.03 kg/m  as calculated from the following:    Height as of 2/22/24: 1.613 m (5' 3.5\").    Weight as of 2/22/24: 119.7 kg (264 " lb).  Wt Readings from Last 6 Encounters:   02/22/24 119.7 kg (264 lb)   10/07/22 115.2 kg (254 lb)   04/30/21 121.6 kg (268 lb)   11/08/19 119.7 kg (264 lb)   02/05/09 86.2 kg (190 lb)       Physical Exam  GENERAL: alert and no distress  EYES: Eyes grossly normal to inspection, PERRL and conjunctivae and sclerae normal  HENT: ear canals and TM's normal, nose and mouth without ulcers or lesions  NECK: no adenopathy, no asymmetry, masses, or scars  RESP: lungs clear to auscultation - no rales, rhonchi or wheezes  BREAST: normal without masses, tenderness or nipple discharge and no palpable axillary masses or adenopathy  CV: regular rate and rhythm, normal S1 S2, no S3 or S4, no murmur, click or rub, no peripheral edema  ABDOMEN: soft, nontender, no hepatosplenomegaly, no masses and bowel sounds normal  MS: no gross musculoskeletal defects noted, no edema  SKIN: no suspicious lesions or rashes  NEURO: Normal strength and tone, mentation intact and speech normal  PSYCH: mentation appears normal, affect normal/bright        Signed Electronically by: Abril Martin MD    Prior to immunization administration, verified patients identity using patient s name and date of birth. Please see Immunization Activity for additional information.     Screening Questionnaire for Adult Immunization    Are you sick today?   No   Do you have allergies to medications, food, a vaccine component or latex?   No   Have you ever had a serious reaction after receiving a vaccination?   No   Do you have a long-term health problem with heart, lung, kidney, or metabolic disease (e.g., diabetes), asthma, a blood disorder, no spleen, complement component deficiency, a cochlear implant, or a spinal fluid leak?  Are you on long-term aspirin therapy?   Yes   Do you have cancer, leukemia, HIV/AIDS, or any other immune system problem?   No   Do you have a parent, brother, or sister with an immune system problem?   No   In the past 3 months, have you taken  medications that affect  your immune system, such as prednisone, other steroids, or anticancer drugs; drugs for the treatment of rheumatoid arthritis, Crohn s disease, or psoriasis; or have you had radiation treatments?   No   Have you had a seizure, or a brain or other nervous system problem?   No   During the past year, have you received a transfusion of blood or blood    products, or been given immune (gamma) globulin or antiviral drug?   No   For women: Are you pregnant or is there a chance you could become       pregnant during the next month?   No   Have you received any vaccinations in the past 4 weeks?   No     Immunization questionnaire was positive for at least one answer.  Notified .      Patient instructed to remain in clinic for 15 minutes afterwards, and to report any adverse reactions.     Screening performed by Kati Pino MA on 5/10/2024 at 10:55 AM.

## 2024-05-11 LAB — TSH SERPL DL<=0.005 MIU/L-ACNC: 1.62 UIU/ML (ref 0.3–4.2)

## 2024-05-12 LAB
DEPRECATED MISC ALLERGEN IGE RAST QL: NORMAL
MELON IGE QN: <0.1 KU/L

## 2024-05-13 LAB
A ALTERNATA IGE QN: <0.1 KU(A)/L
A FUMIGATUS IGE QN: <0.1 KU(A)/L
BANANA IGE QN: 0.33 KU(A)/L
BERMUDA GRASS IGE QN: 2.48 KU(A)/L
C HERBARUM IGE QN: <0.1 KU(A)/L
CAT DANDER IGG QN: <0.1 KU(A)/L
CEDAR IGE QN: 4.46 KU(A)/L
COMMON RAGWEED IGE QN: 2.66 KU(A)/L
COTTONWOOD IGE QN: 1.53 KU(A)/L
D FARINAE IGE QN: <0.1 KU(A)/L
D PTERONYSS IGE QN: <0.1 KU(A)/L
DOG DANDER+EPITH IGE QN: <0.1 KU(A)/L
IGE SERPL-ACNC: 107 KU/L (ref 0–114)
MAPLE IGE QN: 5.02 KU(A)/L
MARSH ELDER IGE QN: 0.5 KU(A)/L
MOUSE URINE PROT IGE QN: <0.1 KU(A)/L
NETTLE IGE QN: 0.19 KU(A)/L
P NOTATUM IGE QN: <0.1 KU(A)/L
ROACH IGE QN: <0.1 KU(A)/L
SALTWORT IGE QN: 1.19 KU(A)/L
SILVER BIRCH IGE QN: 10.9 KU(A)/L
TIMOTHY IGE QN: 0.41 KU(A)/L
WHITE ASH IGE QN: 4.99 KU(A)/L
WHITE ELM IGE QN: 2.4 KU(A)/L
WHITE MULBERRY IGE QN: <0.1 KU(A)/L
WHITE OAK IGE QN: 2.72 KU(A)/L

## 2024-05-14 NOTE — PATIENT INSTRUCTIONS
"Preventive Care Advice   This is general advice we often give to help people stay healthy. Your care team may have specific advice just for you. Please talk to your care team about your own preventive care needs.  Lifestyle  Exercise at least 150 minutes each week (30 minutes a day, 5 days a week).  Do muscle strengthening activities 2 days a week. These help control your weight and prevent disease.  No smoking.  Wear sunscreen to prevent skin cancer.  Have your home tested for radon every 2 to 5 years. Radon is a colorless, odorless gas that can harm your lungs. To learn more, go to www.health.[facility].mn. and search for \"Radon in Homes.\"  Keep guns unloaded and locked up in a safe place like a safe or gun vault, or, use a gun lock and hide the keys. Always lock away bullets separately. To learn more, visit [place].gov and search for \"safe gun storage.\"  Nutrition  Eat 5 or more servings of fruits and vegetables each day.  Try wheat bread, brown rice and whole grain pasta (instead of white bread, rice, and pasta).  Get enough calcium and vitamin D. Check the label on foods and aim for 100% of the RDA (recommended daily allowance).  Regular exams  Have a dental exam and cleaning every 6 months.  See your health care team every year to talk about:  Any changes in your health.  Any medicines your care team has prescribed.  Preventive care, family planning, and ways to prevent chronic diseases.  Shots (vaccines)   HPV shots (up to age 26), if you've never had them before.  Hepatitis B shots (up to age 59), if you've never had them before.  COVID-19 shot: Get this shot when it's due.  Flu shot: Get a flu shot every year.  Tetanus shot: Get a tetanus shot every 10 years.  Pneumococcal, hepatitis A, and RSV shots: Ask your care team if you need these based on your risk.  Shingles shot (for age 50 and up).  General health tests  Diabetes screening:  Starting at age 35, Get screened for diabetes at least every 3 years.  If " you are younger than age 35, ask your care team if you should be screened for diabetes.  Cholesterol test: At age 39, start having a cholesterol test every 5 years, or more often if advised.  Bone density scan (DEXA): At age 50, ask your care team if you should have this scan for osteoporosis (brittle bones).  Hepatitis C: Get tested at least once in your life.  Abdominal aortic aneurysm screening: Talk to your doctor about having this screening if you:  Have ever smoked; and  Are biologically male; and  Are between the ages of 65 and 75.  STIs (sexually transmitted infections)  Before age 24: Ask your care team if you should be screened for STIs.  After age 24: Get screened for STIs if you're at risk. You are at risk for STIs (including HIV) if:  You are sexually active with more than one person.  You don't use condoms every time.  You or a partner was diagnosed with a sexually transmitted infection.  If you are at risk for HIV, ask about PrEP medicine to prevent HIV.  Get tested for HIV at least once in your life, whether you are at risk for HIV or not.  Cancer screening tests  Cervical cancer screening: If you have a cervix, begin getting regular cervical cancer screening tests at age 21. Most people who have regular screenings with normal results can stop after age 65. Talk about this with your provider.  Breast cancer scan (mammogram): If you've ever had breasts, begin having regular mammograms starting at age 40. This is a scan to check for breast cancer.  Colon cancer screening: It is important to start screening for colon cancer at age 45.  Have a colonoscopy test every 10 years (or more often if you're at risk) Or, ask your provider about stool tests like a FIT test every year or Cologuard test every 3 years.  To learn more about your testing options, visit: [email or web address].  For help making a decision, visit: bhargav/ut31510.  Prostate cancer screening test: If you have a prostate and are age 55  to 69, ask your provider if you would benefit from a yearly prostate cancer screening test.  Lung cancer screening: If you are a current or former smoker age 50 to 80, ask your care team if ongoing lung cancer screenings are right for you.  For informational purposes only. Not to replace the advice of your health care provider. Copyright   2023 [employer]. All rights reserved. Clinically reviewed by the [facility]. [employer] 259635 - REV 04/24.    Learning About Stress  What is stress?     Stress is your body's response to a hard situation. Your body can have a physical, emotional, or mental response. Stress is a fact of life for most people, and it affects everyone differently. What causes stress for you may not be stressful for someone else.  A lot of things can cause stress. You may feel stress when you go on a job interview, take a test, or run a race. This kind of short-term stress is normal and even useful. It can help you if you need to work hard or react quickly. For example, stress can help you finish an important job on time.  Long-term stress is caused by ongoing stressful situations or events. Examples of long-term stress include long-term health problems, ongoing problems at work, or conflicts in your family. Long-term stress can harm your health.  How does stress affect your health?  When you are stressed, your body responds as though you are in danger. It makes hormones that speed up your heart, make you breathe faster, and give you a burst of energy. This is called the fight-or-flight stress response. If the stress is over quickly, your body goes back to normal and no harm is done.  But if stress happens too often or lasts too long, it can have bad effects. Long-term stress can make you more likely to get sick, and it can make symptoms of some diseases worse. If you tense up when you are stressed, you may develop neck, shoulder, or low back pain. Stress is  linked to high blood pressure and heart disease.  Stress also harms your emotional health. It can make you roy, tense, or depressed. Your relationships may suffer, and you may not do well at work or school.  What can you do to manage stress?  You can try these things to help manage stress:   Do something active. Exercise or activity can help reduce stress. Walking is a great way to get started. Even everyday activities such as housecleaning or yard work can help.  Try yoga or marla chi. These techniques combine exercise and meditation. You may need some training at first to learn them.  Do something you enjoy. For example, listen to music or go to a movie. Practice your hobby or do volunteer work.  Meditate. This can help you relax, because you are not worrying about what happened before or what may happen in the future.  Do guided imagery. Imagine yourself in any setting that helps you feel calm. You can use online videos, books, or a teacher to guide you.  Do breathing exercises. For example:  From a standing position, bend forward from the waist with your knees slightly bent. Let your arms dangle close to the floor.  Breathe in slowly and deeply as you return to a standing position. Roll up slowly and lift your head last.  Hold your breath for just a few seconds in the standing position.  Breathe out slowly and bend forward from the waist.  Let your feelings out. Talk, laugh, cry, and express anger when you need to. Talking with supportive friends or family, a counselor, or a sayra leader about your feelings is a healthy way to relieve stress. Avoid discussing your feelings with people who make you feel worse.  Write. It may help to write about things that are bothering you. This helps you find out how much stress you feel and what is causing it. When you know this, you can find better ways to cope.  What can you do to prevent stress?  You might try some of these things to help prevent stress:  Manage your time.  "This helps you find time to do the things you want and need to do.  Get enough sleep. Your body recovers from the stresses of the day while you are sleeping.  Get support. Your family, friends, and community can make a difference in how you experience stress.  Limit your news feed. Avoid or limit time on social media or news that may make you feel stressed.  Do something active. Exercise or activity can help reduce stress. Walking is a great way to get started.  Where can you learn more?  Go to https://www.[employer].net/patiented  Enter N032 in the search box to learn more about \"Learning About Stress.\"  Current as of: October 24, 2023               Content Version: 14.0    7112-6442 [email or web address].   Care instructions adapted under license by your healthcare professional. If you have questions about a medical condition or this instruction, always ask your healthcare professional. [email or web address] disclaims any warranty or liability for your use of this information.      Substance Use Disorder: Care Instructions  Overview     You can improve your life and health by stopping your use of alcohol or drugs. When you don't drink or use drugs, you may feel and sleep better. You may get along better with your family, friends, and coworkers. There are medicines and programs that can help with substance use disorder.  How can you care for yourself at home?  Here are some ways to help you stay sober and prevent relapse.  If you have been given medicine to help keep you sober or reduce your cravings, be sure to take it exactly as prescribed.  Talk to your doctor about programs that can help you stop using drugs or drinking alcohol.  Do not keep alcohol or drugs in your home.  Plan ahead. Think about what you'll say if other people ask you to drink or use drugs. Try not to spend time with people who drink or use drugs.  Use the time and money spent on drinking or drugs to do something that's important to " you.  Preventing a relapse  Have a plan to deal with relapse. Learn to recognize changes in your thinking that lead you to drink or use drugs. Get help before you start to drink or use drugs again.  Try to stay away from situations, friends, or places that may lead you to drink or use drugs.  If you feel the need to drink alcohol or use drugs again, seek help right away. Call a trusted friend or family member. Some people get support from organizations such as Narcotics Anonymous or [employer] or from treatment facilities.  If you relapse, get help as soon as you can. Some people make a plan with another person that outlines what they want that person to do for them if they relapse. The plan usually includes how to handle the relapse and who to notify in case of relapse.  Don't give up. Remember that a relapse doesn't mean that you have failed. Use the experience to learn the triggers that lead you to drink or use drugs. Then quit again. Recovery is a lifelong process. Many people have several relapses before they are able to quit for good.  Follow-up care is a key part of your treatment and safety. Be sure to make and go to all appointments, and call your doctor if you are having problems. It's also a good idea to know your test results and keep a list of the medicines you take.  When should you call for help?   Call 911  anytime you think you may need emergency care. For example, call if you or someone else:    Has overdosed or has withdrawal signs. Be sure to tell the emergency workers that you are or someone else is using or trying to quit using drugs. Overdose or withdrawal signs may include:  Losing consciousness.  Seizure.  Seeing or hearing things that aren't there (hallucinations).     Is thinking or talking about suicide or harming others.   Where to get help 24 hours a day, 7 days a week   If you or someone you know talks about suicide, self-harm, a mental health crisis, a substance use crisis, or any  "other kind of emotional distress, get help right away. You can:    Call the Suicide and Crisis Lifeline at 988.     Call 9-326-303-NQJT (1-150.682.9548).     Text HOME to 932276 to access the Crisis Text Line.   Consider saving these numbers in your phone.  Go to [employer] for more information or to chat online.  Call your doctor now or seek immediate medical care if:    You are having withdrawal symptoms. These may include nausea or vomiting, sweating, shakiness, and anxiety.   Watch closely for changes in your health, and be sure to contact your doctor if:    You have a relapse.     You need more help or support to stop.   Where can you learn more?  Go to https://www.[employer].net/patiented  Enter H573 in the search box to learn more about \"Substance Use Disorder: Care Instructions.\"  Current as of: November 15, 2023               Content Version: 14.0    7566-5655 [employer].   Care instructions adapted under license by your healthcare professional. If you have questions about a medical condition or this instruction, always ask your healthcare professional. Healthwise, [employer] disclaims any warranty or liability for your use of this information.      "

## 2024-05-22 ENCOUNTER — TRANSFERRED RECORDS (OUTPATIENT)
Dept: HEALTH INFORMATION MANAGEMENT | Facility: CLINIC | Age: 39
End: 2024-05-22
Payer: MEDICAID

## 2024-05-24 ENCOUNTER — TRANSFERRED RECORDS (OUTPATIENT)
Dept: HEALTH INFORMATION MANAGEMENT | Facility: CLINIC | Age: 39
End: 2024-05-24
Payer: MEDICAID

## 2024-06-04 ENCOUNTER — OFFICE VISIT (OUTPATIENT)
Dept: FAMILY MEDICINE | Facility: CLINIC | Age: 39
End: 2024-06-04
Payer: COMMERCIAL

## 2024-06-04 VITALS
HEIGHT: 64 IN | SYSTOLIC BLOOD PRESSURE: 125 MMHG | RESPIRATION RATE: 18 BRPM | TEMPERATURE: 98.6 F | BODY MASS INDEX: 44.73 KG/M2 | OXYGEN SATURATION: 98 % | WEIGHT: 262 LBS | HEART RATE: 88 BPM | DIASTOLIC BLOOD PRESSURE: 86 MMHG

## 2024-06-04 DIAGNOSIS — B18.1 CHRONIC HEPATITIS B (H): ICD-10-CM

## 2024-06-04 DIAGNOSIS — F17.200 NICOTINE DEPENDENCE, UNCOMPLICATED, UNSPECIFIED NICOTINE PRODUCT TYPE: ICD-10-CM

## 2024-06-04 DIAGNOSIS — F33.42 MAJOR DEPRESSIVE DISORDER, RECURRENT, IN FULL REMISSION (H): ICD-10-CM

## 2024-06-04 DIAGNOSIS — F33.2 SEVERE EPISODE OF RECURRENT MAJOR DEPRESSIVE DISORDER, WITHOUT PSYCHOTIC FEATURES (H): ICD-10-CM

## 2024-06-04 DIAGNOSIS — J45.20 MILD INTERMITTENT ASTHMA WITHOUT COMPLICATION: ICD-10-CM

## 2024-06-04 DIAGNOSIS — K21.00 GASTROESOPHAGEAL REFLUX DISEASE WITH ESOPHAGITIS WITHOUT HEMORRHAGE: ICD-10-CM

## 2024-06-04 DIAGNOSIS — E11.29 TYPE 2 DIABETES MELLITUS WITH MICROALBUMINURIA, WITHOUT LONG-TERM CURRENT USE OF INSULIN (H): ICD-10-CM

## 2024-06-04 DIAGNOSIS — N85.01 ENDOMETRIAL HYPERPLASIA, COMPLEX: ICD-10-CM

## 2024-06-04 DIAGNOSIS — D50.0 IRON DEFICIENCY ANEMIA DUE TO CHRONIC BLOOD LOSS: ICD-10-CM

## 2024-06-04 DIAGNOSIS — R80.9 TYPE 2 DIABETES MELLITUS WITH MICROALBUMINURIA, WITHOUT LONG-TERM CURRENT USE OF INSULIN (H): ICD-10-CM

## 2024-06-04 DIAGNOSIS — Z01.818 PREOP GENERAL PHYSICAL EXAM: Primary | ICD-10-CM

## 2024-06-04 DIAGNOSIS — Z72.0 TOBACCO USE: ICD-10-CM

## 2024-06-04 DIAGNOSIS — N18.1 CKD (CHRONIC KIDNEY DISEASE) STAGE 1, GFR 90 ML/MIN OR GREATER: ICD-10-CM

## 2024-06-04 PROCEDURE — 36415 COLL VENOUS BLD VENIPUNCTURE: CPT | Performed by: PHYSICIAN ASSISTANT

## 2024-06-04 PROCEDURE — 80048 BASIC METABOLIC PNL TOTAL CA: CPT | Performed by: PHYSICIAN ASSISTANT

## 2024-06-04 PROCEDURE — 99214 OFFICE O/P EST MOD 30 MIN: CPT | Performed by: PHYSICIAN ASSISTANT

## 2024-06-04 RX ORDER — TRAZODONE HYDROCHLORIDE 100 MG/1
100 TABLET ORAL AT BEDTIME
COMMUNITY
End: 2024-07-23

## 2024-06-04 RX ORDER — TRAZODONE HYDROCHLORIDE 100 MG/1
100 TABLET ORAL AT BEDTIME
Status: SHIPPED
Start: 2024-06-04

## 2024-06-04 RX ORDER — CONTAINER,EMPTY
1 EACH MISCELLANEOUS PRN
Qty: 1 EACH | Refills: 1 | Status: SHIPPED | OUTPATIENT
Start: 2024-06-04

## 2024-06-04 NOTE — ASSESSMENT & PLAN NOTE
She has been experiencing heavy bleeding with cycle with spotting.   Pelvic ultrasound May 16, 2024 demonstrated thickened endometrium 20 mm.  She is undergoing hysteroscopy with endometrial biopsy for further evaluation.

## 2024-06-04 NOTE — ASSESSMENT & PLAN NOTE
She states her depression is better.  Her mother passed away earlier this year.  She has known PTSD and states after the  she had some night braxton.  Symptoms are stable on fluoxetine.

## 2024-06-04 NOTE — ASSESSMENT & PLAN NOTE
Smokes 4 cigarettes per day.  Declines medication for smoking cessation at this time.  States she smokes socially.

## 2024-06-04 NOTE — PROGRESS NOTES
Preoperative Evaluation  [facility]  [address]  [facility] [address]  Phone: [phone]  Fax: [phone]  Primary Provider: Abril Martin MD  Pre-op Performing Provider: Mona Arnold PA-C  Jun 4, 2024 6/4/2024   Surgical Information   What procedure is being done? hysteroscopy with biopsy   Facility or Hospital where procedure/surgery will be performed: [facility]   Who is doing the procedure / surgery?    Date of surgery / procedure: june 7   Time of surgery / procedure: 11 am   Where do you plan to recover after surgery? at home with family     Fax number for surgical facility  [phone]  Assessment & Plan     The proposed surgical procedure is considered LOW risk.    Problem List Items Addressed This Visit       Chronic hepatitis B (H)     She has chronic hepatitis B.  She is asymptomatic.  In 10/2022 Hepatitis B DNA undetected.           GERD (gastroesophageal reflux disease)     Symptoms stable on pepcid.           CKD (chronic kidney disease) stage 1, GFR 90 ml/min or greater     Last Comprehensive Metabolic Panel:  Lab Results   Component Value Date     02/22/2024    POTASSIUM 4.2 02/22/2024    CHLORIDE 103 02/22/2024    CO2 25 02/22/2024    ANIONGAP 10 02/22/2024     (H) 02/22/2024    BUN 18.1 02/22/2024    CR 0.74 02/22/2024    GFRESTIMATED >90 02/22/2024    GABRIELA 9.5 02/22/2024   Creatinine stable at 0.74.            Type 2 diabetes mellitus  (H)     Lab Results   Component Value Date    A1C 6.5 05/10/2024    A1C 7.0 02/22/2024    A1C 6.4 10/07/2022    A1C 8.2 04/30/2021   Diabetes well controlled.  Continue metformin and exenatude,             Endometrial hyperplasia, complex     She has been experiencing heavy bleeding with cycle with spotting.   Pelvic ultrasound May 16, 2024 demonstrated thickened endometrium 20 mm.  She is undergoing hysteroscopy with endometrial biopsy for further evaluation.         Iron  "deficiency anemia due to chronic blood loss     Lab Results   Component Value Date    WBC 8.0 05/10/2024     Lab Results   Component Value Date    RBC 5.40 05/10/2024     Lab Results   Component Value Date    HGB 15.1 05/10/2024     Lab Results   Component Value Date    HCT 45.9 05/10/2024     No components found for: \"MCT\"  Lab Results   Component Value Date    MCV 85 05/10/2024     Lab Results   Component Value Date    MCH 28.0 05/10/2024     Lab Results   Component Value Date    MCHC 32.9 05/10/2024     Lab Results   Component Value Date    RDW 13.4 05/10/2024     Lab Results   Component Value Date     05/10/2024     Normal hgb 5/10/24.         Major depressive disorder, recurrent, in full remission (H24)     She states her depression is better.  Her mother passed away earlier this year.  She has known PTSD and states after the  she had some night braxton.  Symptoms are stable on fluoxetine.           Relevant Medications    traZODone (DESYREL) 100 MG tablet    BMI 40.0-44.9, adult (H)     BMI 45.  She is working on weight loss.  She started exercising this year with her partner.         Mild intermittent asthma     Symptoms stable.  Albuterol for rescue inhaler.           Severe episode of recurrent major depressive disorder, without psychotic features (H)     She states her depression is better.  Her mother passed away earlier this year.  She has known PTSD and states after the  she had some night braxton.  Symptoms are stable on fluoxetine.           Relevant Medications    traZODone (DESYREL) 100 MG tablet    Tobacco use     Smokes 4 cigarettes per day.  Declines medication for smoking cessation at this time.  States she smokes socially.          Other Visit Diagnoses       Preop general physical exam    -  Primary    Nicotine dependence, uncomplicated, unspecified nicotine product type              Possible Sleep Apnea:  {No sleep apnea.    Implanted Device  None.       - No identified " additional risk factors other than previously addressed    Antiplatelet or Anticoagulation Medication Instructions   - Patient is on no antiplatelet or anticoagulation medications.    Additional Medication Instructions  Take all scheduled medications on the day of surgery EXCEPT for modifications listed below: hold metformin morning of surgery.  She held Bydureon this week.      Recommendation  Approval given to proceed with proposed procedure, without further diagnostic evaluation.    Agnes Anderson is a 38 year old, presenting for the following:  Pre-Op Exam (06/07/2024, Hysteroscopy, possible polypectomy/ endometrial ablation)          6/4/2024     3:04 PM   Additional Questions   Roomed by clr   Accompanied by self         6/4/2024     3:04 PM   Patient Reported Additional Medications   Patient reports taking the following new medications none     HPI related to upcoming procedure: She has been experiencing heavy bleeding with cycle with spotting this has been ongoing for several years.   Pelvic ultrasound May 16, 2024 demonstrated thickened endometrium 20 mm.  She is undergoing hysteroscopy with endometrial biopsy for further evaluation.        6/4/2024   Pre-Op Questionnaire   Have you ever had a heart attack or stroke? No   Have you ever had surgery on your heart or blood vessels, such as a stent placement, a coronary artery bypass, or surgery on an artery in your head, neck, heart, or legs? No   Do you have chest pain with activity? No   Do you have a history of heart failure? No   Do you currently have a cold, bronchitis or symptoms of other infection? No   Do you have a cough, shortness of breath, or wheezing? (!) YES, occasional wheezing, none recently   Do you or anyone in your family have previous history of blood clots? No   Do you or does anyone in your family have a serious bleeding problem such as prolonged bleeding following surgeries or cuts? No   Have you ever had problems with anemia or been  told to take iron pills? (!) YES , recent hgb normal   Have you had any abnormal blood loss such as black, tarry or bloody stools, or abnormal vaginal bleeding? No   Have you ever had a blood transfusion? No   Are you willing to have a blood transfusion if it is medically needed before, during, or after your surgery? Yes   Have you or any of your relatives ever had problems with anesthesia? No   Do you have sleep apnea, excessive snoring or daytime drowsiness? No   Do you have any artifical heart valves or other implanted medical devices like a pacemaker, defibrillator, or continuous glucose monitor? No   Do you have artificial joints? No   Are you allergic to latex? No     Health Care Directive  Patient does not have a Health Care Directive or Living Will: Discussed advance care planning with patient; however, patient declined at this time.    Preoperative Review of    reviewed - no record of controlled substances prescribed.      Patient Active Problem List    Diagnosis Date Noted    Tobacco use 06/04/2024     Priority: Medium    Grief 02/22/2024     Priority: Medium    Severe episode of recurrent major depressive disorder, without psychotic features (H) 01/18/2024     Priority: Medium    Mild intermittent asthma 01/25/2022     Priority: Medium    Vitamin D deficiency 08/03/2021     Priority: Medium    Low serum high density lipoprotein (HDL) 08/03/2021     Priority: Medium    CKD (chronic kidney disease) stage 1, GFR 90 ml/min or greater 05/05/2021     Priority: Medium     H/o low creatinine 2016      Easy bruising 05/05/2021     Priority: Medium    Iron deficiency anemia due to chronic blood loss 05/05/2021     Priority: Medium    Type 2 diabetes mellitus  (H) 04/30/2021     Priority: Medium     Dx: 2016. A1c>8. Microalbuminuria 2022. No neuropathy, retinopathy.       Endometrial hyperplasia, complex 05/05/2016     Priority: Medium     5/16: Endometrial echo 20 mm, thickened.  Pt is bleeding, the measured  thickness appears to have vascular flow within, may wish to consider hysteroscopy and/or D&C for optimal evaluation.  5/14: Endometrium, biopsy --  Focal complex hyperplasia without atypia and rare squamous   Morules, No malignancy seen. Some studies report an associated increased risk of concurrent or   future development of endometrial carcinoma with squamous morular metaplasia.  Recommend followup 3-6 interval endometrial curettage and close long term surveillance.       BMI 40.0-44.9, adult (H) 07/03/2013     Priority: Medium    Major depressive disorder, recurrent, in full remission (H24) 01/02/2013     Priority: Medium    Panic disorder with agoraphobia 01/02/2013     Priority: Medium    PTSD (post-traumatic stress disorder) 01/02/2013     Priority: Medium    Chronic hepatitis B (H) 02/05/2009     Priority: Medium     HBeAg: Neg  DNA: <2,000 international unit(s)/mL   ALT: WNL  Hep A:  Immune, Hep C: Neg, Hep D: Neg  Alcohol use:  Neg  FH HCC:   ____  Family tested: ___  Household immunized: ___   Dx date: 2008.      GERD (gastroesophageal reflux disease) 02/05/2009     Priority: Medium      Past Medical History:   Diagnosis Date    Chlamydia 2013    Infertility counseling 5/5/2021    Trying from [id number] without pregnancy. Oligomenorrhea.    Major depressive disorder, recurrent, in full remission (H24) 1/2/2013    Marijuana dependence (H) 2/5/2009    Secondary oligomenorrhea 5/5/2021    PCOS?     Past Surgical History:   Procedure Laterality Date    NO PAST SURGERIES       Current Outpatient Medications   Medication Sig Dispense Refill    ACCU-CHEK GUIDE test strip       acetaminophen (TYLENOL) 500 MG tablet Take 2 tablets (1,000 mg) by mouth every 6 hours as needed for mild pain 200 tablet 4    albuterol (PROAIR HFA/PROVENTIL HFA/VENTOLIN HFA) 108 (90 Base) MCG/ACT inhaler Inhale 2 puffs into the lungs every 4 hours as needed 18 g 0    alcohol swab prep pads Use to swab area of injection/nishant as directed.  "100 each 11    blood glucose (NO BRAND SPECIFIED) test strip Use to test blood sugar ONCE daily. 100 strip 3    cetirizine (ZYRTEC) 10 MG tablet Take 1 tablet (10 mg) by mouth daily 90 tablet 4    exenatide ER (BYDUREON BCISE) 2 MG/0.85ML auto-injector Inject 2 mg Subcutaneous every 7 days 3.4 mL 4    famotidine (PEPCID) 20 MG tablet Take 1 tablet (20 mg total) by mouth 2 (two) times a day. 180 tablet 0    ferrous gluconate (FERGON) 324 (38 Fe) MG tablet Take 1 tablet (324 mg) by mouth every other day 100 tablet 1    FLUoxetine (PROZAC) 40 MG capsule Take 1 capsule (40 mg) by mouth daily 90 capsule 4    fluticasone (FLONASE) 50 MCG/ACT nasal spray Spray 2 sprays into both nostrils daily 18.2 g 4    metFORMIN (GLUCOPHAGE XR) 500 MG 24 hr tablet Take 1 tablet (500 mg) by mouth daily (with dinner) 90 tablet 0    Sharps Container MISC 1 each as needed (for auto-injector disposal) 1 each 1    traZODone (DESYREL) 100 MG tablet Take 100 mg by mouth at bedtime      vitamin C (ASCORBIC ACID) 500 MG tablet Take 1,000 mg by mouth      Vitamin D, Cholecalciferol, 25 MCG (1000 UT) CAPS Take 1,000 Units by mouth daily 100 capsule 4    zolpidem (AMBIEN) 5 MG tablet Take 1 tablet (5 mg) by mouth nightly as needed for sleep 14 tablet 0       Allergies   Allergen Reactions    Zoloft      Sharp pains in arms        Social History     Tobacco Use    Smoking status: Every Day     Current packs/day: 0.25     Average packs/day: 0.3 packs/day for 10.0 years (2.5 ttl pk-yrs)     Types: Cigarettes    Smokeless tobacco: Never   Substance Use Topics    Alcohol use: No     History   Drug Use    Types: Marijuana     Comment: Drug use: h/o positive amphetamines on screen               Objective    /86 (BP Location: Left arm, Patient Position: Sitting, Cuff Size: Adult Large)   Pulse 88   Temp 98.6  F (37  C) (Oral)   Resp 18   Ht 1.62 m (5' 3.78\")   Wt 118.8 kg (262 lb)   LMP 05/11/2024 (Approximate)   SpO2 98%   Breastfeeding No   " "BMI 45.28 kg/m     Estimated body mass index is 45.28 kg/m  as calculated from the following:    Height as of this encounter: 1.62 m (5' 3.78\").    Weight as of this encounter: 118.8 kg (262 lb).  Physical Exam  Vitals and nursing note reviewed.   Constitutional:       Appearance: Normal appearance. She is normal weight.   HENT:      Head: Normocephalic and atraumatic.      Right Ear: Tympanic membrane, ear canal and external ear normal.      Left Ear: Tympanic membrane, ear canal and external ear normal.      Mouth/Throat:      Mouth: Mucous membranes are moist.      Pharynx: Oropharynx is clear.   Eyes:      Conjunctiva/sclera: Conjunctivae normal.   Cardiovascular:      Rate and Rhythm: Normal rate and regular rhythm.      Pulses: Normal pulses.      Heart sounds: Normal heart sounds.   Pulmonary:      Effort: Pulmonary effort is normal.      Breath sounds: Normal breath sounds.   Abdominal:      General: Abdomen is flat.      Palpations: Abdomen is soft.   Musculoskeletal:      Cervical back: Normal range of motion and neck supple.   Skin:     General: Skin is warm and dry.   Neurological:      General: No focal deficit present.      Mental Status: She is alert and oriented to person, place, and time.   Psychiatric:         Mood and Affect: Mood normal.         Behavior: Behavior normal.         Thought Content: Thought content normal.         Judgment: Judgment normal.         Recent Labs   Lab Test 05/10/24  1158 02/22/24  1625   HGB 15.1 15.0    255   NA  --  138   POTASSIUM  --  4.2   CR  --  0.74   A1C 6.5* 7.0*      BMP ordered today.    Diagnostics  Lab Results   Component Value Date    WBC 8.0 05/10/2024     Lab Results   Component Value Date    RBC 5.40 05/10/2024     Lab Results   Component Value Date    HGB 15.1 05/10/2024     Lab Results   Component Value Date    HCT 45.9 05/10/2024     No components found for: \"MCT\"  Lab Results   Component Value Date    MCV 85 05/10/2024     Lab Results "   Component Value Date    MCH 28.0 05/10/2024     Lab Results   Component Value Date    MCHC 32.9 05/10/2024     Lab Results   Component Value Date    RDW 13.4 05/10/2024     Lab Results   Component Value Date     05/10/2024       No EKG required for low risk surgery (cataract, skin procedure, breast biopsy, etc).    Revised Cardiac Risk Index (RCRI)  The patient has the following serious cardiovascular risks for perioperative complications:   - No serious cardiac risks = 0 points     RCRI Interpretation: 0 points: Class I (very low risk - 0.4% complication rate)       Signed Electronically by: Mona Arnold PA-C  Copy of this evaluation report is provided to requesting physician.

## 2024-06-04 NOTE — ASSESSMENT & PLAN NOTE
Lab Results   Component Value Date    A1C 6.5 05/10/2024    A1C 7.0 02/22/2024    A1C 6.4 10/07/2022    A1C 8.2 04/30/2021   Diabetes well controlled.  Continue metformin and exenatude,

## 2024-06-04 NOTE — ASSESSMENT & PLAN NOTE
"Lab Results   Component Value Date    WBC 8.0 05/10/2024     Lab Results   Component Value Date    RBC 5.40 05/10/2024     Lab Results   Component Value Date    HGB 15.1 05/10/2024     Lab Results   Component Value Date    HCT 45.9 05/10/2024     No components found for: \"MCT\"  Lab Results   Component Value Date    MCV 85 05/10/2024     Lab Results   Component Value Date    MCH 28.0 05/10/2024     Lab Results   Component Value Date    MCHC 32.9 05/10/2024     Lab Results   Component Value Date    RDW 13.4 05/10/2024     Lab Results   Component Value Date     05/10/2024     Normal hgb 5/10/24.  "

## 2024-06-04 NOTE — H&P (VIEW-ONLY)
Preoperative Evaluation  [facility]  [address]  [facility] [address]  Phone: [phone]  Fax: [phone]  Primary Provider: Abril Martin MD  Pre-op Performing Provider: Mona Arnold PA-C  Jun 4, 2024 6/4/2024   Surgical Information   What procedure is being done? hysteroscopy with biopsy   Facility or Hospital where procedure/surgery will be performed: [facility]   Who is doing the procedure / surgery?    Date of surgery / procedure: june 7   Time of surgery / procedure: 11 am   Where do you plan to recover after surgery? at home with family     Fax number for surgical facility  [phone]  Assessment & Plan     The proposed surgical procedure is considered LOW risk.    Problem List Items Addressed This Visit       Chronic hepatitis B (H)     She has chronic hepatitis B.  She is asymptomatic.  In 10/2022 Hepatitis B DNA undetected.           GERD (gastroesophageal reflux disease)     Symptoms stable on pepcid.           CKD (chronic kidney disease) stage 1, GFR 90 ml/min or greater     Last Comprehensive Metabolic Panel:  Lab Results   Component Value Date     02/22/2024    POTASSIUM 4.2 02/22/2024    CHLORIDE 103 02/22/2024    CO2 25 02/22/2024    ANIONGAP 10 02/22/2024     (H) 02/22/2024    BUN 18.1 02/22/2024    CR 0.74 02/22/2024    GFRESTIMATED >90 02/22/2024    GABRIELA 9.5 02/22/2024   Creatinine stable at 0.74.            Type 2 diabetes mellitus  (H)     Lab Results   Component Value Date    A1C 6.5 05/10/2024    A1C 7.0 02/22/2024    A1C 6.4 10/07/2022    A1C 8.2 04/30/2021   Diabetes well controlled.  Continue metformin and exenatude,             Endometrial hyperplasia, complex     She has been experiencing heavy bleeding with cycle with spotting.   Pelvic ultrasound May 16, 2024 demonstrated thickened endometrium 20 mm.  She is undergoing hysteroscopy with endometrial biopsy for further evaluation.         Iron  "deficiency anemia due to chronic blood loss     Lab Results   Component Value Date    WBC 8.0 05/10/2024     Lab Results   Component Value Date    RBC 5.40 05/10/2024     Lab Results   Component Value Date    HGB 15.1 05/10/2024     Lab Results   Component Value Date    HCT 45.9 05/10/2024     No components found for: \"MCT\"  Lab Results   Component Value Date    MCV 85 05/10/2024     Lab Results   Component Value Date    MCH 28.0 05/10/2024     Lab Results   Component Value Date    MCHC 32.9 05/10/2024     Lab Results   Component Value Date    RDW 13.4 05/10/2024     Lab Results   Component Value Date     05/10/2024     Normal hgb 5/10/24.         Major depressive disorder, recurrent, in full remission (H24)     She states her depression is better.  Her mother passed away earlier this year.  She has known PTSD and states after the  she had some night braxton.  Symptoms are stable on fluoxetine.           Relevant Medications    traZODone (DESYREL) 100 MG tablet    BMI 40.0-44.9, adult (H)     BMI 45.  She is working on weight loss.  She started exercising this year with her partner.         Mild intermittent asthma     Symptoms stable.  Albuterol for rescue inhaler.           Severe episode of recurrent major depressive disorder, without psychotic features (H)     She states her depression is better.  Her mother passed away earlier this year.  She has known PTSD and states after the  she had some night braxton.  Symptoms are stable on fluoxetine.           Relevant Medications    traZODone (DESYREL) 100 MG tablet    Tobacco use     Smokes 4 cigarettes per day.  Declines medication for smoking cessation at this time.  States she smokes socially.          Other Visit Diagnoses       Preop general physical exam    -  Primary    Nicotine dependence, uncomplicated, unspecified nicotine product type              Possible Sleep Apnea:  {No sleep apnea.    Implanted Device  None.       - No identified " additional risk factors other than previously addressed    Antiplatelet or Anticoagulation Medication Instructions   - Patient is on no antiplatelet or anticoagulation medications.    Additional Medication Instructions  Take all scheduled medications on the day of surgery EXCEPT for modifications listed below: hold metformin morning of surgery.  She held Bydureon this week.      Recommendation  Approval given to proceed with proposed procedure, without further diagnostic evaluation.    Agnes Anderson is a 38 year old, presenting for the following:  Pre-Op Exam (06/07/2024, Hysteroscopy, possible polypectomy/ endometrial ablation)          6/4/2024     3:04 PM   Additional Questions   Roomed by clr   Accompanied by self         6/4/2024     3:04 PM   Patient Reported Additional Medications   Patient reports taking the following new medications none     HPI related to upcoming procedure: She has been experiencing heavy bleeding with cycle with spotting this has been ongoing for several years.   Pelvic ultrasound May 16, 2024 demonstrated thickened endometrium 20 mm.  She is undergoing hysteroscopy with endometrial biopsy for further evaluation.        6/4/2024   Pre-Op Questionnaire   Have you ever had a heart attack or stroke? No   Have you ever had surgery on your heart or blood vessels, such as a stent placement, a coronary artery bypass, or surgery on an artery in your head, neck, heart, or legs? No   Do you have chest pain with activity? No   Do you have a history of heart failure? No   Do you currently have a cold, bronchitis or symptoms of other infection? No   Do you have a cough, shortness of breath, or wheezing? (!) YES, occasional wheezing, none recently   Do you or anyone in your family have previous history of blood clots? No   Do you or does anyone in your family have a serious bleeding problem such as prolonged bleeding following surgeries or cuts? No   Have you ever had problems with anemia or been  told to take iron pills? (!) YES , recent hgb normal   Have you had any abnormal blood loss such as black, tarry or bloody stools, or abnormal vaginal bleeding? No   Have you ever had a blood transfusion? No   Are you willing to have a blood transfusion if it is medically needed before, during, or after your surgery? Yes   Have you or any of your relatives ever had problems with anesthesia? No   Do you have sleep apnea, excessive snoring or daytime drowsiness? No   Do you have any artifical heart valves or other implanted medical devices like a pacemaker, defibrillator, or continuous glucose monitor? No   Do you have artificial joints? No   Are you allergic to latex? No     Health Care Directive  Patient does not have a Health Care Directive or Living Will: Discussed advance care planning with patient; however, patient declined at this time.    Preoperative Review of    reviewed - no record of controlled substances prescribed.      Patient Active Problem List    Diagnosis Date Noted    Tobacco use 06/04/2024     Priority: Medium    Grief 02/22/2024     Priority: Medium    Severe episode of recurrent major depressive disorder, without psychotic features (H) 01/18/2024     Priority: Medium    Mild intermittent asthma 01/25/2022     Priority: Medium    Vitamin D deficiency 08/03/2021     Priority: Medium    Low serum high density lipoprotein (HDL) 08/03/2021     Priority: Medium    CKD (chronic kidney disease) stage 1, GFR 90 ml/min or greater 05/05/2021     Priority: Medium     H/o low creatinine 2016      Easy bruising 05/05/2021     Priority: Medium    Iron deficiency anemia due to chronic blood loss 05/05/2021     Priority: Medium    Type 2 diabetes mellitus  (H) 04/30/2021     Priority: Medium     Dx: 2016. A1c>8. Microalbuminuria 2022. No neuropathy, retinopathy.       Endometrial hyperplasia, complex 05/05/2016     Priority: Medium     5/16: Endometrial echo 20 mm, thickened.  Pt is bleeding, the measured  thickness appears to have vascular flow within, may wish to consider hysteroscopy and/or D&C for optimal evaluation.  5/14: Endometrium, biopsy --  Focal complex hyperplasia without atypia and rare squamous   Morules, No malignancy seen. Some studies report an associated increased risk of concurrent or   future development of endometrial carcinoma with squamous morular metaplasia.  Recommend followup 3-6 interval endometrial curettage and close long term surveillance.       BMI 40.0-44.9, adult (H) 07/03/2013     Priority: Medium    Major depressive disorder, recurrent, in full remission (H24) 01/02/2013     Priority: Medium    Panic disorder with agoraphobia 01/02/2013     Priority: Medium    PTSD (post-traumatic stress disorder) 01/02/2013     Priority: Medium    Chronic hepatitis B (H) 02/05/2009     Priority: Medium     HBeAg: Neg  DNA: <2,000 international unit(s)/mL   ALT: WNL  Hep A:  Immune, Hep C: Neg, Hep D: Neg  Alcohol use:  Neg  FH HCC:   ____  Family tested: ___  Household immunized: ___   Dx date: 2008.      GERD (gastroesophageal reflux disease) 02/05/2009     Priority: Medium      Past Medical History:   Diagnosis Date    Chlamydia 2013    Infertility counseling 5/5/2021    Trying from [id number] without pregnancy. Oligomenorrhea.    Major depressive disorder, recurrent, in full remission (H24) 1/2/2013    Marijuana dependence (H) 2/5/2009    Secondary oligomenorrhea 5/5/2021    PCOS?     Past Surgical History:   Procedure Laterality Date    NO PAST SURGERIES       Current Outpatient Medications   Medication Sig Dispense Refill    ACCU-CHEK GUIDE test strip       acetaminophen (TYLENOL) 500 MG tablet Take 2 tablets (1,000 mg) by mouth every 6 hours as needed for mild pain 200 tablet 4    albuterol (PROAIR HFA/PROVENTIL HFA/VENTOLIN HFA) 108 (90 Base) MCG/ACT inhaler Inhale 2 puffs into the lungs every 4 hours as needed 18 g 0    alcohol swab prep pads Use to swab area of injection/nishant as directed.  "100 each 11    blood glucose (NO BRAND SPECIFIED) test strip Use to test blood sugar ONCE daily. 100 strip 3    cetirizine (ZYRTEC) 10 MG tablet Take 1 tablet (10 mg) by mouth daily 90 tablet 4    exenatide ER (BYDUREON BCISE) 2 MG/0.85ML auto-injector Inject 2 mg Subcutaneous every 7 days 3.4 mL 4    famotidine (PEPCID) 20 MG tablet Take 1 tablet (20 mg total) by mouth 2 (two) times a day. 180 tablet 0    ferrous gluconate (FERGON) 324 (38 Fe) MG tablet Take 1 tablet (324 mg) by mouth every other day 100 tablet 1    FLUoxetine (PROZAC) 40 MG capsule Take 1 capsule (40 mg) by mouth daily 90 capsule 4    fluticasone (FLONASE) 50 MCG/ACT nasal spray Spray 2 sprays into both nostrils daily 18.2 g 4    metFORMIN (GLUCOPHAGE XR) 500 MG 24 hr tablet Take 1 tablet (500 mg) by mouth daily (with dinner) 90 tablet 0    Sharps Container MISC 1 each as needed (for auto-injector disposal) 1 each 1    traZODone (DESYREL) 100 MG tablet Take 100 mg by mouth at bedtime      vitamin C (ASCORBIC ACID) 500 MG tablet Take 1,000 mg by mouth      Vitamin D, Cholecalciferol, 25 MCG (1000 UT) CAPS Take 1,000 Units by mouth daily 100 capsule 4    zolpidem (AMBIEN) 5 MG tablet Take 1 tablet (5 mg) by mouth nightly as needed for sleep 14 tablet 0       Allergies   Allergen Reactions    Zoloft      Sharp pains in arms        Social History     Tobacco Use    Smoking status: Every Day     Current packs/day: 0.25     Average packs/day: 0.3 packs/day for 10.0 years (2.5 ttl pk-yrs)     Types: Cigarettes    Smokeless tobacco: Never   Substance Use Topics    Alcohol use: No     History   Drug Use    Types: Marijuana     Comment: Drug use: h/o positive amphetamines on screen               Objective    /86 (BP Location: Left arm, Patient Position: Sitting, Cuff Size: Adult Large)   Pulse 88   Temp 98.6  F (37  C) (Oral)   Resp 18   Ht 1.62 m (5' 3.78\")   Wt 118.8 kg (262 lb)   LMP 05/11/2024 (Approximate)   SpO2 98%   Breastfeeding No   " "BMI 45.28 kg/m     Estimated body mass index is 45.28 kg/m  as calculated from the following:    Height as of this encounter: 1.62 m (5' 3.78\").    Weight as of this encounter: 118.8 kg (262 lb).  Physical Exam  Vitals and nursing note reviewed.   Constitutional:       Appearance: Normal appearance. She is normal weight.   HENT:      Head: Normocephalic and atraumatic.      Right Ear: Tympanic membrane, ear canal and external ear normal.      Left Ear: Tympanic membrane, ear canal and external ear normal.      Mouth/Throat:      Mouth: Mucous membranes are moist.      Pharynx: Oropharynx is clear.   Eyes:      Conjunctiva/sclera: Conjunctivae normal.   Cardiovascular:      Rate and Rhythm: Normal rate and regular rhythm.      Pulses: Normal pulses.      Heart sounds: Normal heart sounds.   Pulmonary:      Effort: Pulmonary effort is normal.      Breath sounds: Normal breath sounds.   Abdominal:      General: Abdomen is flat.      Palpations: Abdomen is soft.   Musculoskeletal:      Cervical back: Normal range of motion and neck supple.   Skin:     General: Skin is warm and dry.   Neurological:      General: No focal deficit present.      Mental Status: She is alert and oriented to person, place, and time.   Psychiatric:         Mood and Affect: Mood normal.         Behavior: Behavior normal.         Thought Content: Thought content normal.         Judgment: Judgment normal.         Recent Labs   Lab Test 05/10/24  1158 02/22/24  1625   HGB 15.1 15.0    255   NA  --  138   POTASSIUM  --  4.2   CR  --  0.74   A1C 6.5* 7.0*      BMP ordered today.    Diagnostics  Lab Results   Component Value Date    WBC 8.0 05/10/2024     Lab Results   Component Value Date    RBC 5.40 05/10/2024     Lab Results   Component Value Date    HGB 15.1 05/10/2024     Lab Results   Component Value Date    HCT 45.9 05/10/2024     No components found for: \"MCT\"  Lab Results   Component Value Date    MCV 85 05/10/2024     Lab Results "   Component Value Date    MCH 28.0 05/10/2024     Lab Results   Component Value Date    MCHC 32.9 05/10/2024     Lab Results   Component Value Date    RDW 13.4 05/10/2024     Lab Results   Component Value Date     05/10/2024       No EKG required for low risk surgery (cataract, skin procedure, breast biopsy, etc).    Revised Cardiac Risk Index (RCRI)  The patient has the following serious cardiovascular risks for perioperative complications:   - No serious cardiac risks = 0 points     RCRI Interpretation: 0 points: Class I (very low risk - 0.4% complication rate)       Signed Electronically by: Mona Arnold PA-C  Copy of this evaluation report is provided to requesting physician.

## 2024-06-04 NOTE — ASSESSMENT & PLAN NOTE
Last Comprehensive Metabolic Panel:  Lab Results   Component Value Date     02/22/2024    POTASSIUM 4.2 02/22/2024    CHLORIDE 103 02/22/2024    CO2 25 02/22/2024    ANIONGAP 10 02/22/2024     (H) 02/22/2024    BUN 18.1 02/22/2024    CR 0.74 02/22/2024    GFRESTIMATED >90 02/22/2024    GABRIELA 9.5 02/22/2024   Creatinine stable at 0.74.

## 2024-06-04 NOTE — PATIENT INSTRUCTIONS
"Learning About Benefits of Quitting Smoking  Quitting smoking helps your body in many ways. Quitting lowers your risk for cancer, lung disease, heart attack, stroke, blood vessel disease, and blindness. You'll also get sick less often and heal faster. And after you quit, you may find that your mood is better and you are less stressed.  When and how will you feel healthier after quitting smoking?    In the first hours or days:    Your blood pressure and heart rate go down.  Your oxygen levels increase.    Within weeks or months:    You will cough less and breathe deeper. It may be easier to be active.  Your sense of taste and smell should return.    Over the years:    Your risks of heart disease, heart attack, and stroke are lower.  Your risk of lung cancer is cut by about half after about 10 years. And your risk for other cancers is lower too.  How would quitting help others in your life?    Their heart, lung, and cancer risks may drop, much like yours. They will also be sick less.   If you are or will be pregnant someday, quitting smoking means a healthier .   Where can you learn more?  Go to https://www.[employer].net/patiented  Enter O319 in the search box to learn more about \"Learning About Benefits of Quitting Smoking.\"  Current as of: November 15, 2023               Content Version: 14.0    4680-7792 [employer].   Care instructions adapted under license by your healthcare professional. If you have questions about a medical condition or this instruction, always ask your healthcare professional. [employer] disclaims any warranty or liability for your use of this information.      How to Take Your Medication Before Surgery  Preoperative Medication Instructions   Antiplatelet or Anticoagulation Medication Instructions   - Patient is on no antiplatelet or anticoagulation medications.    Additional Medication Instructions  Take all scheduled medications on the day of surgery EXCEPT " for modifications listed below:Hold metformin morning of surgery. She held Bydureon this week.       Patient Education   Preparing for Your Surgery  Getting started  A nurse will call you to review your health history and instructions. They will give you an arrival time based on your scheduled surgery time. Please be ready to share:  Your doctor's clinic name and phone number  Your medical, surgical, and anesthesia history  A list of allergies and sensitivities  A list of medicines, including herbal treatments and over-the-counter drugs  Whether the patient has a legal guardian (ask how to send us the papers in advance)  Please tell us if you're pregnant--or if there's any chance you might be pregnant. Some surgeries may injure a fetus (unborn baby), so they require a pregnancy test. Surgeries that are safe for a fetus don't always need a test, and you can choose whether to have one.   If you have a child who's having surgery, please ask for a copy of Preparing for Your Child's Surgery.    Preparing for surgery  Within 10 to 30 days of surgery: Have a pre-op exam (sometimes called an H&P, or History and Physical). This can be done at a clinic or pre-operative center.  If you're having a , you may not need this exam. Talk to your care team.  At your pre-op exam, talk to your care team about all medicines you take. If you need to stop any medicines before surgery, ask when to start taking them again.  We do this for your safety. Many medicines can make you bleed too much during surgery. Some change how well surgery (anesthesia) drugs work.  Call your insurance company to let them know you're having surgery. (If you don't have insurance, call [phone].)  Call your clinic if there's any change in your health. This includes signs of a cold or flu (sore throat, runny nose, cough, rash, fever). It also includes a scrape or scratch near the surgery site.  If you have questions on the day of surgery, call your  hospital or surgery center.  Eating and drinking guidelines  For your safety: Unless your surgeon tells you otherwise, follow the guidelines below.  Eat and drink as usual until 8 hours before you arrive for surgery. After that, no food or milk.  Drink clear liquids until 2 hours before you arrive. These are liquids you can see through, like water, Gatorade, and Propel Water. They also include plain black coffee and tea (no cream or milk), candy, and breath mints. You can spit out gum when you arrive.  If you drink alcohol: Stop drinking it the night before surgery.  If your care team tells you to take medicine on the morning of surgery, it's okay to take it with a sip of water.  Preventing infection  Shower or bathe the night before and morning of your surgery. Follow the instructions your clinic gave you. (If no instructions, use regular soap.)  Don't shave or clip hair near your surgery site. We'll remove the hair if needed.  Don't smoke or vape the morning of surgery. You may chew nicotine gum up to 2 hours before surgery. A nicotine patch is okay.  Note: Some surgeries require you to completely quit smoking and nicotine. Check with your surgeon.  Your care team will make every effort to keep you safe from infection. We will:  Clean our hands often with soap and water (or an alcohol-based hand rub).  Clean the skin at your surgery site with a special soap that kills germs.  Give you a special gown to keep you warm. (Cold raises the risk of infection.)  Wear special hair covers, masks, gowns and gloves during surgery.  Give antibiotic medicine, if prescribed. Not all surgeries need antibiotics.  What to bring on the day of surgery  Photo ID and insurance card  Copy of your health care directive, if you have one  Glasses and hearing aids (bring cases)  You can't wear contacts during surgery  Inhaler and eye drops, if you use them (tell us about these when you arrive)  CPAP machine or breathing device, if you use  them  A few personal items, if spending the night  If you have . . .  A pacemaker, ICD (cardiac defibrillator) or other implant: Bring the ID card.  An implanted stimulator: Bring the remote control.  A legal guardian: Bring a copy of the certified (court-stamped) guardianship papers.  Please remove any jewelry, including body piercings. Leave jewelry and other valuables at home.  If you're going home the day of surgery  You must have a responsible adult drive you home. They should stay with you overnight as well.  If you don't have someone to stay with you, and you aren't safe to go home alone, we may keep you overnight. Insurance often won't pay for this.  After surgery  If it's hard to control your pain or you need more pain medicine, please call your surgeon's office.  Questions?   If you have any questions for your care team, list them here: _________________________________________________________________________________________________________________________________________________________________________ ____________________________________ ____________________________________ ____________________________________  For informational purposes only. Not to replace the advice of your health care provider. Copyright   2003, 2019 [employer]. All rights reserved. Clinically reviewed by Jessica Justin MD. [employer] 570208 - REV 12/22.

## 2024-06-05 ENCOUNTER — ANESTHESIA EVENT (OUTPATIENT)
Dept: SURGERY | Facility: AMBULATORY SURGERY CENTER | Age: 39
End: 2024-06-05
Payer: COMMERCIAL

## 2024-06-05 LAB
ANION GAP SERPL CALCULATED.3IONS-SCNC: 11 MMOL/L (ref 7–15)
BUN SERPL-MCNC: 17.1 MG/DL (ref 6–20)
CALCIUM SERPL-MCNC: 10.2 MG/DL (ref 8.6–10)
CHLORIDE SERPL-SCNC: 105 MMOL/L (ref 98–107)
CREAT SERPL-MCNC: 0.55 MG/DL (ref 0.51–0.95)
DEPRECATED HCO3 PLAS-SCNC: 23 MMOL/L (ref 22–29)
EGFRCR SERPLBLD CKD-EPI 2021: >90 ML/MIN/1.73M2
GLUCOSE SERPL-MCNC: 101 MG/DL (ref 70–99)
POTASSIUM SERPL-SCNC: 4.5 MMOL/L (ref 3.4–5.3)
SODIUM SERPL-SCNC: 139 MMOL/L (ref 135–145)

## 2024-06-06 ENCOUNTER — TELEPHONE (OUTPATIENT)
Dept: FAMILY MEDICINE | Facility: CLINIC | Age: 39
End: 2024-06-06
Payer: COMMERCIAL

## 2024-06-06 DIAGNOSIS — N18.1 CKD (CHRONIC KIDNEY DISEASE) STAGE 1, GFR 90 ML/MIN OR GREATER: Primary | ICD-10-CM

## 2024-06-06 NOTE — TELEPHONE ENCOUNTER
Patient notified of provider's message as written, she verbalized understanding.    Calcium prepped below     Marcus DAVIS RN  [facility]

## 2024-06-06 NOTE — TELEPHONE ENCOUNTER
----- Message from Mona Arnold PA-C sent at 6/6/2024  7:31 AM CDT -----  Please let patient know that her calcium is mildly elevated.  I would like to repeat this in 1 week.  She should come in fasting and make sure she is well-hydrated at the time of the recheck.  Please florin up the repeat calcium.  Thank you.

## 2024-06-07 ENCOUNTER — HOSPITAL ENCOUNTER (OUTPATIENT)
Facility: AMBULATORY SURGERY CENTER | Age: 39
Discharge: HOME | End: 2024-06-07
Attending: OBSTETRICS & GYNECOLOGY
Payer: COMMERCIAL

## 2024-06-07 ENCOUNTER — TRANSFERRED RECORDS (OUTPATIENT)
Dept: HEALTH INFORMATION MANAGEMENT | Facility: CLINIC | Age: 39
End: 2024-06-07

## 2024-06-07 ENCOUNTER — ANESTHESIA (OUTPATIENT)
Dept: SURGERY | Facility: AMBULATORY SURGERY CENTER | Age: 39
End: 2024-06-07
Payer: COMMERCIAL

## 2024-06-07 VITALS
SYSTOLIC BLOOD PRESSURE: 123 MMHG | HEART RATE: 80 BPM | BODY MASS INDEX: 44.73 KG/M2 | TEMPERATURE: 97.8 F | RESPIRATION RATE: 16 BRPM | WEIGHT: 262 LBS | DIASTOLIC BLOOD PRESSURE: 84 MMHG | HEIGHT: 64 IN | OXYGEN SATURATION: 95 %

## 2024-06-07 DIAGNOSIS — N93.9 ABNORMAL UTERINE BLEEDING (AUB): ICD-10-CM

## 2024-06-07 LAB
GLUCOSE BY METER: 153
HCG UR QL: NEGATIVE
INTERNAL QC OK POCT: NORMAL
POCT KIT EXPIRATION DATE: NORMAL
POCT KIT LOT NUMBER: NORMAL

## 2024-06-07 RX ORDER — ONDANSETRON 4 MG/1
4 TABLET, ORALLY DISINTEGRATING ORAL EVERY 30 MIN PRN
Status: DISCONTINUED | OUTPATIENT
Start: 2024-06-07 | End: 2024-06-08 | Stop reason: HOSPADM

## 2024-06-07 RX ORDER — LIDOCAINE HYDROCHLORIDE 20 MG/ML
INJECTION, SOLUTION INFILTRATION; PERINEURAL PRN
Status: DISCONTINUED | OUTPATIENT
Start: 2024-06-07 | End: 2024-06-07

## 2024-06-07 RX ORDER — NALOXONE HYDROCHLORIDE 0.4 MG/ML
0.1 INJECTION, SOLUTION INTRAMUSCULAR; INTRAVENOUS; SUBCUTANEOUS
Status: DISCONTINUED | OUTPATIENT
Start: 2024-06-07 | End: 2024-06-08 | Stop reason: HOSPADM

## 2024-06-07 RX ORDER — ACETAMINOPHEN 325 MG/1
975 TABLET ORAL ONCE
Status: DISCONTINUED | OUTPATIENT
Start: 2024-06-07 | End: 2024-06-08 | Stop reason: HOSPADM

## 2024-06-07 RX ORDER — LIDOCAINE HYDROCHLORIDE 10 MG/ML
INJECTION, SOLUTION EPIDURAL; INFILTRATION; INTRACAUDAL; PERINEURAL PRN
Status: DISCONTINUED | OUTPATIENT
Start: 2024-06-07 | End: 2024-06-07 | Stop reason: HOSPADM

## 2024-06-07 RX ORDER — FENTANYL CITRATE 50 UG/ML
INJECTION, SOLUTION INTRAMUSCULAR; INTRAVENOUS PRN
Status: DISCONTINUED | OUTPATIENT
Start: 2024-06-07 | End: 2024-06-07

## 2024-06-07 RX ORDER — ONDANSETRON 2 MG/ML
INJECTION INTRAMUSCULAR; INTRAVENOUS PRN
Status: DISCONTINUED | OUTPATIENT
Start: 2024-06-07 | End: 2024-06-07

## 2024-06-07 RX ORDER — OXYCODONE HYDROCHLORIDE 10 MG/1
10 TABLET ORAL
Status: DISCONTINUED | OUTPATIENT
Start: 2024-06-07 | End: 2024-06-08 | Stop reason: HOSPADM

## 2024-06-07 RX ORDER — SODIUM CHLORIDE, SODIUM LACTATE, POTASSIUM CHLORIDE, CALCIUM CHLORIDE 600; 310; 30; 20 MG/100ML; MG/100ML; MG/100ML; MG/100ML
INJECTION, SOLUTION INTRAVENOUS CONTINUOUS
Status: DISCONTINUED | OUTPATIENT
Start: 2024-06-07 | End: 2024-06-08 | Stop reason: HOSPADM

## 2024-06-07 RX ORDER — OXYCODONE HYDROCHLORIDE 5 MG/1
5 TABLET ORAL
Status: DISCONTINUED | OUTPATIENT
Start: 2024-06-07 | End: 2024-06-08 | Stop reason: HOSPADM

## 2024-06-07 RX ORDER — GLYCOPYRROLATE 0.2 MG/ML
INJECTION, SOLUTION INTRAMUSCULAR; INTRAVENOUS PRN
Status: DISCONTINUED | OUTPATIENT
Start: 2024-06-07 | End: 2024-06-07

## 2024-06-07 RX ORDER — DEXAMETHASONE SODIUM PHOSPHATE 4 MG/ML
4 INJECTION, SOLUTION INTRA-ARTICULAR; INTRALESIONAL; INTRAMUSCULAR; INTRAVENOUS; SOFT TISSUE
Status: DISCONTINUED | OUTPATIENT
Start: 2024-06-07 | End: 2024-06-08 | Stop reason: HOSPADM

## 2024-06-07 RX ORDER — PROPOFOL 10 MG/ML
INJECTION, EMULSION INTRAVENOUS CONTINUOUS PRN
Status: DISCONTINUED | OUTPATIENT
Start: 2024-06-07 | End: 2024-06-07

## 2024-06-07 RX ORDER — IBUPROFEN 800 MG/1
800 TABLET, FILM COATED ORAL ONCE
Status: DISCONTINUED | OUTPATIENT
Start: 2024-06-07 | End: 2024-06-08 | Stop reason: HOSPADM

## 2024-06-07 RX ORDER — PROPOFOL 10 MG/ML
INJECTION, EMULSION INTRAVENOUS PRN
Status: DISCONTINUED | OUTPATIENT
Start: 2024-06-07 | End: 2024-06-07

## 2024-06-07 RX ORDER — ACETAMINOPHEN 325 MG/1
975 TABLET ORAL ONCE
Status: COMPLETED | OUTPATIENT
Start: 2024-06-07 | End: 2024-06-07

## 2024-06-07 RX ORDER — ONDANSETRON 2 MG/ML
4 INJECTION INTRAMUSCULAR; INTRAVENOUS EVERY 30 MIN PRN
Status: DISCONTINUED | OUTPATIENT
Start: 2024-06-07 | End: 2024-06-08 | Stop reason: HOSPADM

## 2024-06-07 RX ORDER — DEXAMETHASONE SODIUM PHOSPHATE 4 MG/ML
INJECTION, SOLUTION INTRA-ARTICULAR; INTRALESIONAL; INTRAMUSCULAR; INTRAVENOUS; SOFT TISSUE PRN
Status: DISCONTINUED | OUTPATIENT
Start: 2024-06-07 | End: 2024-06-07

## 2024-06-07 RX ORDER — LIDOCAINE 40 MG/G
CREAM TOPICAL
Status: DISCONTINUED | OUTPATIENT
Start: 2024-06-07 | End: 2024-06-08 | Stop reason: HOSPADM

## 2024-06-07 RX ADMIN — PROPOFOL 20 MG: 10 INJECTION, EMULSION INTRAVENOUS at 11:13

## 2024-06-07 RX ADMIN — SODIUM CHLORIDE, SODIUM LACTATE, POTASSIUM CHLORIDE, CALCIUM CHLORIDE: 600; 310; 30; 20 INJECTION, SOLUTION INTRAVENOUS at 10:47

## 2024-06-07 RX ADMIN — DEXAMETHASONE SODIUM PHOSPHATE 4 MG: 4 INJECTION, SOLUTION INTRA-ARTICULAR; INTRALESIONAL; INTRAMUSCULAR; INTRAVENOUS; SOFT TISSUE at 11:11

## 2024-06-07 RX ADMIN — ONDANSETRON 4 MG: 2 INJECTION INTRAMUSCULAR; INTRAVENOUS at 11:11

## 2024-06-07 RX ADMIN — LIDOCAINE HYDROCHLORIDE 50 MG: 20 INJECTION, SOLUTION INFILTRATION; PERINEURAL at 11:11

## 2024-06-07 RX ADMIN — FENTANYL CITRATE 25 MCG: 50 INJECTION, SOLUTION INTRAMUSCULAR; INTRAVENOUS at 11:20

## 2024-06-07 RX ADMIN — GLYCOPYRROLATE 0.2 MG: 0.2 INJECTION, SOLUTION INTRAMUSCULAR; INTRAVENOUS at 11:11

## 2024-06-07 RX ADMIN — ACETAMINOPHEN 975 MG: 325 TABLET ORAL at 10:37

## 2024-06-07 RX ADMIN — PROPOFOL 250 MCG/KG/MIN: 10 INJECTION, EMULSION INTRAVENOUS at 11:13

## 2024-06-07 NOTE — DISCHARGE INSTRUCTIONS
You have received 975 mg of Acetaminophen (Tylenol) at 10:37 am. Please do not take an additional dose of Tylenol until after 4:37 pm     Do not exceed 4,000 mg of acetaminophen during a 24 hour period and keep in mind that acetaminophen can also be found in many over-the-counter cold medications as well as narcotics that may be given for pain.     If you have any questions or concerns regarding your procedure, please contact Dr. Ramirez, her office number is [phone].

## 2024-06-07 NOTE — INTERVAL H&P NOTE
"I have reviewed the surgical (or preoperative) H&P that is linked to this encounter, and examined the patient. There are no significant changes    Clinical Conditions Present on Arrival:  Clinically Significant Risk Factors Present on Admission          # Hypercalcemia: Highest Ca = 10.2 mg/dL in last 30 days, will monitor as appropriate         # DMII: A1C = 6.5 % (Ref range: 0.0 - 5.6 %) within past 6 months  # Severe Obesity: Estimated body mass index is 45.28 kg/m  as calculated from the following:    Height as of this encounter: 1.62 m (5' 3.78\").    Weight as of this encounter: 118.8 kg (262 lb).       "

## 2024-06-07 NOTE — OP NOTE
"Gynecology Operative Note:    Pre-operative diagnosis: Abnormal uterine bleeding (AUB) [N93.9]   Post-operative diagnosis: Same   Procedure: Hysteroscopy dilation and curettage with polypectomy   Surgeon: Magali Ramirez DO   Assistant(s): None   Anesthesia: MAC (monitored anesthesia care)   Estimated blood loss: 5mL   Total IV fluids: (See anesthesia record)   Specimens: Endometrial curetting, endometrial polyps   Findings: The endometrial cavity is completely filled with thickened endometrium and polyps with increased vasculature and calcifications   Complications: None   Condition: Stable                                   Procedure:  The patient was brought to the operating room in stable condition.  Anesthesia was administered. She was positioned in the dorsal lithotomy position, the prepped and draped in the typical sterile fashion. A time out was performed. A speculum was inserted in the posterior aspect of the vagina. A single toothed-tenaculum was used to grasp the anterior lip of the cervix. Cervical block was placed using 10mL of 0.25% Marcaine. The cervix was sequentially dilated using Hegar dilators to accommodate the hysteroscope, which was then introduced under direct visualization, and the uterus was distended with normal saline. The aforementioned findings were noted. The Myosure device was then introduced into the uterus and the uterine lining was shaved down symmetrically and diffusely as much as possible due to diffuse nature of the lining. The endometrial sampling was sent to Pathology, filling 3 \"socks\".   The tenaculum was removed from the cervix and good hemostasis was noted from puncture sites. Instrument and sponge counts were correct times two. Patient tolerated the procedure well and was taken to the recovery room in stable condition. Patient will go home after recovering from anesthesia and meeting all criteria for discharge. She was given instructions to follow up within 1-2 weeks and " was instructed to take Motrin for pain.     Magali Ramirez DO, FACOOG, FACOG  [facility]

## 2024-06-07 NOTE — ANESTHESIA PREPROCEDURE EVALUATION
Anesthesia Pre-Procedure Evaluation    Patient: Monica Doshi   MRN: [id number] : 1985        Procedure : Procedure(s):  HYSTEROSCOPY WITH BIOPSY OF ENDOMETRIUM AND/OR POLYPECTOMY          Past Medical History:   Diagnosis Date     Chlamydia 2013     Diabetes (H)      Gastroesophageal reflux disease      Hepatitis      Infertility counseling 2021    Trying from 2736-0974 without pregnancy. Oligomenorrhea.     Major depressive disorder, recurrent, in full remission (H24) 2013     Marijuana dependence (H) 2009     Secondary oligomenorrhea 2021    PCOS?      Past Surgical History:   Procedure Laterality Date     NO PAST SURGERIES        Allergies   Allergen Reactions     Zoloft      Sharp pains in arms      Social History     Tobacco Use     Smoking status: Every Day     Current packs/day: 0.10     Average packs/day: 0.1 packs/day for 10.0 years (1.0 ttl pk-yrs)     Types: Cigarettes     Smokeless tobacco: Never   Substance Use Topics     Alcohol use: No      Wt Readings from Last 1 Encounters:   24 118.8 kg (262 lb)        Anesthesia Evaluation            ROS/MED HX  ENT/Pulmonary:     (+)                      asthma                  Neurologic:       Cardiovascular:       METS/Exercise Tolerance:     Hematologic:       Musculoskeletal:       GI/Hepatic:     (+) GERD,          hepatitis         Renal/Genitourinary:       Endo:     (+)  type II DM,             Obesity,       Psychiatric/Substance Use:     (+)     Recreational drug usage: Cannabis.    Infectious Disease:       Malignancy:       Other:            Physical Exam    Airway        Mallampati: III   TM distance: > 3 FB   Neck ROM: full     Respiratory Devices and Support         Dental       (+) Minor Abnormalities - some fillings, tiny chips      Cardiovascular   cardiovascular exam normal          Pulmonary               OUTSIDE LABS:  CBC:   Lab Results   Component Value Date    WBC 8.0 05/10/2024    WBC 7.6 2024     "HGB 15.1 05/10/2024    HGB 15.0 02/22/2024    HCT 45.9 05/10/2024    HCT 45.9 02/22/2024     05/10/2024     02/22/2024     BMP:   Lab Results   Component Value Date     06/04/2024     02/22/2024    POTASSIUM 4.5 06/04/2024    POTASSIUM 4.2 02/22/2024    CHLORIDE 105 06/04/2024    CHLORIDE 103 02/22/2024    CO2 23 06/04/2024    CO2 25 02/22/2024    BUN 17.1 06/04/2024    BUN 18.1 02/22/2024    CR 0.55 06/04/2024    CR 0.74 02/22/2024     (H) 06/04/2024     (H) 02/22/2024     COAGS: No results found for: \"PTT\", \"INR\", \"FIBR\"  POC: No results found for: \"BGM\", \"HCG\", \"HCGS\"  HEPATIC:   Lab Results   Component Value Date    ALBUMIN 4.2 02/22/2024    PROTTOTAL 7.2 02/22/2024    ALT 27 02/22/2024    AST 18 02/22/2024    ALKPHOS 62 02/22/2024    BILITOTAL 0.4 02/22/2024     OTHER:   Lab Results   Component Value Date    A1C 6.5 (H) 05/10/2024    GABRIELA 10.2 (H) 06/04/2024    TSH 1.62 05/10/2024       Anesthesia Plan    ASA Status:  3    NPO Status:  NPO Appropriate    Anesthesia Type: MAC.              Consents    Anesthesia Plan(s) and associated risks, benefits, and realistic alternatives discussed. Questions answered and patient/representative(s) expressed understanding.     - Discussed: Risks, Benefits and Alternatives for BOTH SEDATION and the PROCEDURE were discussed     - Discussed with:  Patient      - Extended Intubation/Ventilatory Support Discussed: No.      - Patient is DNR/DNI Status: No     Use of blood products discussed: No .     Postoperative Care    Pain management: IV analgesics, Oral pain medications.   PONV prophylaxis: Ondansetron (or other 5HT-3), Dexamethasone or Solumedrol     Comments:               Adalberto Robertson DO    I have reviewed the pertinent notes and labs in the chart from the past 30 days and (re)examined the patient.  Any updates or changes from those notes are reflected in this note.      # Hypercalcemia: Highest Ca = 10.2 mg/dL in last 30 " "days, will monitor as appropriate         # DMII: A1C = 6.5 % (Ref range: 0.0 - 5.6 %) within past 6 months  # Severe Obesity: Estimated body mass index is 45.28 kg/m  as calculated from the following:    Height as of 6/4/24: 1.62 m (5' 3.78\").    Weight as of 6/4/24: 118.8 kg (262 lb).      "

## 2024-06-07 NOTE — ANESTHESIA POSTPROCEDURE EVALUATION
Patient: Monica Doshi    Procedure: Procedure(s):  HYSTEROSCOPY WITH BIOPSY OF ENDOMETRIUM AND POLYPECTOMY       Anesthesia Type:  MAC    Note:  Disposition: Outpatient   Postop Pain Control: Uneventful            Sign Out: Well controlled pain   PONV: No   Neuro/Psych: Uneventful            Sign Out: Acceptable/Baseline neuro status   Airway/Respiratory: Uneventful            Sign Out: Acceptable/Baseline resp. status   CV/Hemodynamics: Uneventful            Sign Out: Acceptable CV status; No obvious hypovolemia; No obvious fluid overload   Other NRE: NONE   DID A NON-ROUTINE EVENT OCCUR? No    Event details/Postop Comments:  Patient recovering comfortably.        Last vitals:  Vitals Value Taken Time   /84 06/07/24 1230   Temp 97.8  F (36.6  C) 06/07/24 1204   Pulse 81 06/07/24 1232   Resp 16 06/07/24 1204   SpO2 95 % 06/07/24 1232   Vitals shown include unfiled device data.    Electronically Signed By: Adalberto Robertson DO  June 7, 2024  12:35 PM

## 2024-06-07 NOTE — ANESTHESIA CARE TRANSFER NOTE
Patient: Monica Doshi    Procedure: Procedure(s):  HYSTEROSCOPY WITH BIOPSY OF ENDOMETRIUM AND POLYPECTOMY       Diagnosis: Abnormal uterine bleeding (AUB) [N93.9]  Diagnosis Additional Information: No value filed.    Anesthesia Type:   MAC     Note:    Oropharynx: oropharynx clear of all foreign objects  Level of Consciousness: awake  Oxygen Supplementation: face mask  Level of Supplemental Oxygen (L/min / FiO2): 8  Independent Airway: airway patency satisfactory and stable  Dentition: dentition unchanged  Vital Signs Stable: post-procedure vital signs reviewed and stable  Report to RN Given: handoff report given  Patient transferred to: Phase II    Handoff Report: Identifed the Patient, Identified the Reponsible Provider, Reviewed the pertinent medical history, Discussed the surgical course, Reviewed Intra-OP anesthesia mangement and issues during anesthesia, Set expectations for post-procedure period and Allowed opportunity for questions and acknowledgement of understanding      Vitals:  Vitals Value Taken Time   /77 06/07/24 1204   Temp 97.8  F (36.6  C) 06/07/24 1204   Pulse 85 06/07/24 1204   Resp 16 06/07/24 1204   SpO2 96 % 06/07/24 1204       Electronically Signed By: MEREDITH Anne CRNA  June 7, 2024  12:06 PM

## 2024-06-17 PROBLEM — C54.1 ENDOMETRIAL ADENOCARCINOMA (H): Status: ACTIVE | Noted: 2024-06-17

## 2024-06-17 PROBLEM — F32.A DEPRESSIVE DISORDER: Status: ACTIVE | Noted: 2024-06-17

## 2024-06-17 PROBLEM — E61.1 IRON DEFICIENCY: Status: ACTIVE | Noted: 2024-06-17

## 2024-06-20 ENCOUNTER — TRANSFERRED RECORDS (OUTPATIENT)
Dept: HEALTH INFORMATION MANAGEMENT | Facility: CLINIC | Age: 39
End: 2024-06-20
Payer: COMMERCIAL

## 2024-06-26 DIAGNOSIS — C54.1 ENDOMETRIAL ADENOCARCINOMA (H): Primary | ICD-10-CM

## 2024-06-26 NOTE — PROGRESS NOTES
Diagnoses and all orders for this visit:    Endometrial adenocarcinoma (H)  -     Adult Comprehensive Weight Management  Referral; Future    BMI 40.0-44.9, adult (H)  -     Adult Comprehensive Weight Management  Referral; Future

## 2024-06-28 ENCOUNTER — TRANSCRIBE ORDERS (OUTPATIENT)
Dept: CARDIOLOGY | Facility: HOSPITAL | Age: 39
End: 2024-06-28
Payer: COMMERCIAL

## 2024-06-28 DIAGNOSIS — Z00.6 EXAMINATION OF PARTICIPANT IN CLINICAL TRIAL: ICD-10-CM

## 2024-06-28 DIAGNOSIS — Z01.818 EXAMINATION PRIOR TO CHEMOTHERAPY: Primary | ICD-10-CM

## 2024-07-02 ENCOUNTER — MYC MEDICAL ADVICE (OUTPATIENT)
Dept: FAMILY MEDICINE | Facility: CLINIC | Age: 39
End: 2024-07-02

## 2024-07-02 ENCOUNTER — HOSPITAL ENCOUNTER (OUTPATIENT)
Dept: BONE DENSITY | Facility: HOSPITAL | Age: 39
Discharge: HOME | End: 2024-07-02
Attending: OBSTETRICS & GYNECOLOGY
Payer: COMMERCIAL

## 2024-07-02 ENCOUNTER — HOSPITAL ENCOUNTER (OUTPATIENT)
Dept: CARDIOLOGY | Facility: HOSPITAL | Age: 39
Discharge: HOME | End: 2024-07-02
Attending: OBSTETRICS & GYNECOLOGY
Payer: COMMERCIAL

## 2024-07-02 DIAGNOSIS — C54.1 ENDOMETRIUM CANCER (H): ICD-10-CM

## 2024-07-02 DIAGNOSIS — Z00.6 EXAMINATION OF PARTICIPANT IN CLINICAL TRIAL: ICD-10-CM

## 2024-07-02 DIAGNOSIS — Z01.818 EXAMINATION PRIOR TO CHEMOTHERAPY: ICD-10-CM

## 2024-07-02 DIAGNOSIS — K21.00 GASTROESOPHAGEAL REFLUX DISEASE WITH ESOPHAGITIS WITHOUT HEMORRHAGE: Primary | ICD-10-CM

## 2024-07-02 LAB
ATRIAL RATE - MUSE: 83 BPM
BI-PLANE LVEF ECHO: NORMAL
DIASTOLIC BLOOD PRESSURE - MUSE: NORMAL MMHG
INTERPRETATION ECG - MUSE: NORMAL
P AXIS - MUSE: 54 DEGREES
PR INTERVAL - MUSE: 184 MS
QRS DURATION - MUSE: 92 MS
QT - MUSE: 374 MS
QTC - MUSE: 439 MS
R AXIS - MUSE: 108 DEGREES
SYSTOLIC BLOOD PRESSURE - MUSE: NORMAL MMHG
T AXIS - MUSE: 20 DEGREES
VENTRICULAR RATE- MUSE: 83 BPM

## 2024-07-02 PROCEDURE — 93356 MYOCRD STRAIN IMG SPCKL TRCK: CPT | Performed by: INTERNAL MEDICINE

## 2024-07-02 PROCEDURE — 77089 TBS DXA CAL W/I&R FX RISK: CPT

## 2024-07-02 PROCEDURE — 93010 ELECTROCARDIOGRAM REPORT: CPT | Performed by: STUDENT IN AN ORGANIZED HEALTH CARE EDUCATION/TRAINING PROGRAM

## 2024-07-02 PROCEDURE — 93306 TTE W/DOPPLER COMPLETE: CPT | Mod: 26 | Performed by: INTERNAL MEDICINE

## 2024-07-02 PROCEDURE — 93005 ELECTROCARDIOGRAM TRACING: CPT

## 2024-07-02 PROCEDURE — 93356 MYOCRD STRAIN IMG SPCKL TRCK: CPT

## 2024-07-02 NOTE — TELEPHONE ENCOUNTER
Contacted patient and did a urgent [facility] referral to help with housing, financial situation and grief. Patient lost her mother and now new cancer diagnosis.  Also scheduled an appointment on 7/8/24 with Dr Martin.  No further action needed.    Message sent to Dr Martin for review    Zoila Fong RN  [facility] Nurse [place] [facility] (supporting Abril Martin MD)     Hi. Dr Martin,   I was wondering if I need to come see you for follow-up for the weight lost medication?

## 2024-07-03 ENCOUNTER — PATIENT OUTREACH (OUTPATIENT)
Dept: CARE COORDINATION | Facility: CLINIC | Age: 39
End: 2024-07-03
Payer: COMMERCIAL

## 2024-07-03 NOTE — Clinical Note
LASHAWN Unable to reach pt. Left VM to call [facility]W back Sent intro letter with [facility] contact info via [email or web address]

## 2024-07-03 NOTE — LETTER
[facility] CARE COORDINATION  [address] [address]    July 5, 2024    Monica Doshi  [address]  [place] [address]      Dear Monica,    I am a clinic community health worker who works with Abril Martin MD with the [facility]. I recently tried to call and was unable to reach you. Below is a description of clinic care coordination and how I can further assist you.       The clinic care coordination team is made up of a registered nurse, , financial resource worker and community health worker who understand the health care system. The goal of clinic care coordination is to help you manage your health and improve access to the health care system. Our team works alongside your provider to assist you in determining your health and social needs. We can help you obtain health care and community resources, providing you with necessary information and education. We can work with you through any barriers and develop a care plan that helps coordinate and strengthen the communication between you and your care team.  Our services are voluntary and are offered without charge to you personally.    Please feel free to contact me with any questions or concerns regarding care coordination and what we can offer.      We are focused on providing you with the highest-quality healthcare experience possible.    Sincerely,     Kavon Hoskins  Community Health Worker  [facility] Care Coordination  [email or web address]  [email or web address]   Office: [phone]  Fax: [phone]

## 2024-07-03 NOTE — PROGRESS NOTES
7/3/2024  Clinic Care Coordination Contact  [facility]/Voicemail    Clinical Data: Care Coordinator Outreach    Outreach Documentation Number of Outreach Attempt   7/3/2024  12:33 PM 1   Clinic Care Coordination Contact    Clinical Data: Care Coordinator Outreach: Discuss CCC enrollment   Outreach attempted x 1.  Left message on patient's voicemail with call back information and requested return call.    Plan: Care Coordinator will try to reach patient again in 1-3 business days.    [facility] Outreach: 2nd attempt 7-5-24    Kavon Hoskins  Community Health Worker  [facility] Care Coordination  [email or web address]   Office: [phone]  Fax: [phone]

## 2024-07-05 NOTE — PROGRESS NOTES
7/5/2024  Clinic Care Coordination Contact  [facility]/Voicemail    Clinical Data: Care Coordinator Outreach    Outreach Documentation Number of Outreach Attempt   7/3/2024  12:33 PM 1   7/5/2024  11:24 AM 2   Clinic Care Coordination Contact  Community Health Worker Initial Outreach  [facility]/Voicemail    PCP referral:  New Diagnosis - Use Comments new cancer dx     Housing     Patient/Caregiver Suport: Resources for Support getting evicted        Clinical Data: Care Coordinator Outreach: Discuss CCC enrollment    Outreach attempted x 2.  Left message on patient's voicemail with call back information and requested return call    Plan: Care Coordinator will send care coordination introduction letter with care coordinator contact information and explanation of care coordination services via [employer].     Care Coordinator will do no further outreaches at this time.  Routed to PCP as LASHAWN Hoskins  Community Health Worker  [facility] Care Coordination  [email or web address]   Office: [phone]  Fax: [phone]

## 2024-07-11 ENCOUNTER — TRANSFERRED RECORDS (OUTPATIENT)
Dept: HEALTH INFORMATION MANAGEMENT | Facility: CLINIC | Age: 39
End: 2024-07-11
Payer: COMMERCIAL

## 2024-07-12 NOTE — TELEPHONE ENCOUNTER
Message routed to Dr Martin for possible sooner appointment    Zoila Fong RN  [facility]      [facility] Nurse [employer] - Primary Care  Phone Number: [phone]     I actually need it sooner... I'm starting the treatment for cancer. I'm so overwhelmed by all of this. The reson why I need the weight lost medication. Do I have to come see Dr. Martin in person... is it possible to have online or  different kind of appointment. All of the is the follow up from Dr. Martin sending me to obgyn... and then I found out i have cancer.. I would like to start the weight lost medication as soon. As possible

## 2024-07-15 NOTE — TELEPHONE ENCOUNTER
RN attempted to contact patient, but no answer. Left message on patient's voice mail to call clinic back.    If patient calls back, please help her schedule an appointment . Thanks    Zoila Fong RN  [facility]      Dr Martin  Do you want to see if she can get in with [place] pharmacist.

## 2024-07-17 NOTE — TELEPHONE ENCOUNTER
Called patient--scheduled for MTM visit next week.     Next 5 appointments (look out 90 days)      Jul 23, 2024 1:00 PM  MTM New with Aaron Treadwell, PharmD  [facility] ([facility] ) [address] [place] [address] [phone]     Aug 01, 2024 4:40 PM  (Arrive by 4:20 PM)  Provider Visit with Abril Martin MD  [facility] ([facility] ) [address] [place] [id number] [phone]          Alma Guzman RN BSN  [facility]

## 2024-07-22 NOTE — PROGRESS NOTES
Medication Therapy Management (MTM) Encounter    ASSESSMENT:                            Medication Adherence/Access: No issues identified      Diabetes:   A1C  at goal <7%. However, per recommendations from Oncology, would be reasonable to transition to a more potent GLP1 to target more weight loss. Per test claims from Liaison team, Ozempic is covered. Trulicity and Mounjaro both require a PA. As patient has been on Bydureon, will skip initial dose of Ozempic.     Can continue metformin for now. Continue to monitor blood glucose. If lows develop, can hold.     Appropriately not on statin due to age.        Endometrial Cancer:   As above, switch to Ozempic per Oncology recommendations.         Iron deficiency anemia due to chronic blood loss:  Last Hgb within normal limits.       Mental Health   Depression  Processing through cancer diagnosis, but feels mood is overall stable right now. Sleeping well with Trazodone.       Low Vitamin D:   Last levels low. Would likely benefit from a higher supplement dose and focus on consistency.     GERD:  Denies symptoms.     Wheezing:   Infrequent symptoms. Refill Albuterol to keep on hand.     Rhinitis:   Well managed with PRN med use.     Constipation:   Infrequent bowel movements. Could be related to GLP1 use, will monitor with the switch to Ozempic. In the mean time, use Miralax as this has been helpful in the past.     PLAN:                            Stop Bydureon. Start Ozempic 0.5 mg weekly   Refill Albuterol.  Increase Vitamin D3 to 5000 units daily.   Start Miralax 17 g daily     Follow-up: Return in about 4 weeks (around 8/20/2024) for Medication Management Pharmacist, in person.    SUBJECTIVE/OBJECTIVE:                          Monica Doshi is a 38 year old female seen for an initial visit. She was referred to me from Abril Martin MD.      Reason for visit: Weight Loss Medicines.    Allergies/ADRs: Reviewed in chart  Past Medical History: Reviewed in chart  Tobacco:  "She reports that she has been smoking cigarettes. She has a 1 pack-year smoking history. She has never used smokeless tobacco.Nicotine/Tobacco Cessation Plan  Information offered: Patient not interested at this time    Alcohol:  reports no history of alcohol use.     Medication Adherence/Access: no issues reported  Forgets appointments, but doesn't forget meds. Has them placed where she will see them throughout the day as a reminder to take doses.     Diabetes   Metformin 500 mg daily   Bydureon 2 mg weekly     Usually gives Bydureon on Tuesday.       Current diabetes symptoms: none    Blood sugar monitoring: Not checking regularly. Checks every couple of weeks. Maybe 120-150s.            Hemoglobin A1C   Date Value Ref Range Status   05/10/2024 6.5 (H) 0.0 - 5.6 % Final     Comment:     Normal <5.7%   Prediabetes 5.7-6.4%    Diabetes 6.5% or higher     Note: Adopted from ADA consensus guidelines.   02/22/2024 7.0 (H) 0.0 - 5.6 % Final     Comment:     Normal <5.7%   Prediabetes 5.7-6.4%    Diabetes 6.5% or higher     Note: Adopted from ADA consensus guidelines.   10/07/2022 6.4 (H) 0.0 - 5.6 % Final     Comment:     Normal <5.7%   Prediabetes 5.7-6.4%    Diabetes 6.5% or higher     Note: Adopted from ADA consensus guidelines.      No results found for: \"MICROALBUR\"   Creatinine Urine mg/dL   Date Value Ref Range Status   02/22/2024 101.0 mg/dL Final     Comment:     The reference ranges have not been established in urine creatinine. The results should be integrated into the clinical context for interpretation.     LDL Cholesterol Calculated   Date Value Ref Range Status   02/22/2024 118 (H) <=100 mg/dL Final     Creatinine   Date Value Ref Range Status   06/04/2024 0.55 0.51 - 0.95 mg/dL Final          Endometrial Cancer:   Treatment with Mirena IUD. Was recommended to work on weight loss to help with effects on endometrial lining.        Iron deficiency anemia due to chronic blood loss:  Ferrous gluconate 324 mg " daily  Vitamin C 1000 mg daily - not using daily.     Hemoglobin   Date Value Ref Range Status   05/10/2024 15.1 11.7 - 15.7 g/dL Final   ]     Mental Health     Depression  Fluoxetine 40 mg once daily.   Trazodone 100 mg at bedtime     With cancer diagnosis, was very hard. Still working on accepting her diagnosis, but feels it's getting better. Sometimes gets overwhelmed with the number of appointments.     Sleeping well with trazodone.          Low Vitamin D:   Vitamin D3 1000 units daily . Doesn't recall the dose. Not taking very often.        Vitamin D Deficiency Screening Results:  Lab Results   Component Value Date    VITDT 16 (L) 02/22/2024    VITDT 24 10/07/2022         GERD:  Famotidine 20 mg twice daily  Omeprazole 20 mg daily     Had heartburn after eating. With medicine not having issues.       Wheezing:   Albuterol HFA 90 mcg 2 puffs every 4 hours as needed     Uses maybe once every 3-4 months.   Needs a refill. Carries if she goes hiking and walking.     Rhinitis:   Cetirizine 10 mg daily   Flonase 50 mcg 2 sprays each nostril daily     Allergy symptoms are well controlled.     Constipation:   Not currently using anything. Used a powder in the past and this helped.     Having bowel movents every 3-4 days. Feels really bloated. Has tried increasing fluid hasn't helped           Today's Vitals: LMP 05/11/2024 (Exact Date)   ----------------      I spent 20 minutes with this patient today. All changes were made via collaborative practice agreement with Abril Martin MD. A copy of the visit note was provided to the patient's provider(s).    A summary of these recommendations was sent via [employer].    Aaron Treadwell PharmD  Medication Therapy Management (MTM) Pharmacist  [facility]      Telemedicine Visit Details  Type of service:  Telephone visit  Start Time: 1:07 PM  End Time: 1:27 PM           Medication Therapy Recommendations  Constipation, unspecified constipation type     Rationale: Untreated condition - Needs additional medication therapy - Indication   Recommendation: Start Medication - MiraLax 17 g Pack   Status: Accepted per CPA         Type 2 diabetes mellitus with microalbuminuria, without long-term current use of insulin (H)    Current Medication: exenatide ER (BYDUREON BCISE) 2 MG/0.85ML auto-injector (Discontinued)   Rationale: More effective medication available - Ineffective medication - Effectiveness   Recommendation: Change Medication - Ozempic (0.25 or 0.5 MG/DOSE) 2 MG/1.5ML Sopn   Status: Accepted per CPA         Vitamin D deficiency    Current Medication: Vitamin D, Cholecalciferol, 25 MCG (1000 UT) CAPS (Discontinued)   Rationale: Dose too low - Dosage too low - Effectiveness   Recommendation: Increase Dose - cholecalciferol 125 MCG (5000 UT) Caps   Status: Accepted per CPA         Wheezing    Current Medication: albuterol (PROAIR HFA/PROVENTIL HFA/VENTOLIN HFA) 108 (90 Base) MCG/ACT inhaler (Discontinued)   Rationale: Medication product not available - Adherence - Adherence   Recommendation: Provide Adherence Intervention   Status: Accepted per CPA

## 2024-07-23 ENCOUNTER — VIRTUAL VISIT (OUTPATIENT)
Dept: PHARMACY | Facility: CLINIC | Age: 39
End: 2024-07-23
Payer: COMMERCIAL

## 2024-07-23 DIAGNOSIS — J31.0 CHRONIC RHINITIS: ICD-10-CM

## 2024-07-23 DIAGNOSIS — F33.42 MAJOR DEPRESSIVE DISORDER, RECURRENT, IN FULL REMISSION (H): ICD-10-CM

## 2024-07-23 DIAGNOSIS — K59.00 CONSTIPATION, UNSPECIFIED CONSTIPATION TYPE: ICD-10-CM

## 2024-07-23 DIAGNOSIS — D50.0 IRON DEFICIENCY ANEMIA DUE TO CHRONIC BLOOD LOSS: ICD-10-CM

## 2024-07-23 DIAGNOSIS — R80.9 TYPE 2 DIABETES MELLITUS WITH MICROALBUMINURIA, WITHOUT LONG-TERM CURRENT USE OF INSULIN (H): Primary | ICD-10-CM

## 2024-07-23 DIAGNOSIS — R06.2 WHEEZING: ICD-10-CM

## 2024-07-23 DIAGNOSIS — E55.9 VITAMIN D DEFICIENCY: ICD-10-CM

## 2024-07-23 DIAGNOSIS — C54.1 ENDOMETRIAL ADENOCARCINOMA (H): ICD-10-CM

## 2024-07-23 DIAGNOSIS — K21.00 GASTROESOPHAGEAL REFLUX DISEASE WITH ESOPHAGITIS WITHOUT HEMORRHAGE: ICD-10-CM

## 2024-07-23 DIAGNOSIS — E11.29 TYPE 2 DIABETES MELLITUS WITH MICROALBUMINURIA, WITHOUT LONG-TERM CURRENT USE OF INSULIN (H): Primary | ICD-10-CM

## 2024-07-23 PROCEDURE — 99605 MTMS BY PHARM NP 15 MIN: CPT | Mod: 93 | Performed by: PHARMACIST

## 2024-07-23 PROCEDURE — 99607 MTMS BY PHARM ADDL 15 MIN: CPT | Mod: 93 | Performed by: PHARMACIST

## 2024-07-23 RX ORDER — POLYETHYLENE GLYCOL 3350 17 G/17G
1 POWDER, FOR SOLUTION ORAL DAILY
Qty: 578 G | Refills: 3 | Status: SHIPPED | OUTPATIENT
Start: 2024-07-23

## 2024-07-23 RX ORDER — ALBUTEROL SULFATE 90 UG/1
2 AEROSOL, METERED RESPIRATORY (INHALATION) EVERY 4 HOURS PRN
Qty: 18 G | Refills: 0 | Status: SHIPPED | OUTPATIENT
Start: 2024-07-23

## 2024-07-23 NOTE — PATIENT INSTRUCTIONS
"Recommendations from today's MTM visit:                                                         Stop Bydureon. Start Ozempic 0.5 mg weekly   Refill Albuterol.  Increase Vitamin D3 to 5000 units daily.   Start Miralax 17 g daily     Follow-up: Return in about 4 weeks (around 8/20/2024) for Medication Management Pharmacist, in person.    It was great speaking with you today.  I value your experience and would be very thankful for your time in providing feedback in our clinic survey. In the next few days, you may receive an email or text message from [employer] with a link to a survey related to your  clinical pharmacist.\"     To schedule another MTM appointment, please call the clinic directly or you may call the MTM scheduling line at [phone].    My Clinical Pharmacist's contact information:                                                      Please feel free to contact me with any questions or concerns you have.      Aaron Treadwell, PharmD  Medication Therapy Management (MTM) Pharmacist  [facility]      "

## 2024-08-17 DIAGNOSIS — Z76.0 ENCOUNTER FOR MEDICATION REFILL: ICD-10-CM

## 2024-08-17 DIAGNOSIS — E11.9 TYPE 2 DIABETES MELLITUS WITHOUT COMPLICATION, WITHOUT LONG-TERM CURRENT USE OF INSULIN (H): ICD-10-CM

## 2024-08-18 NOTE — TELEPHONE ENCOUNTER
Clinic RN: Please contact patient because patient should have run out of this medication on ~ APRIL 2023. Confirm patient is taking this medication as prescribed. Document findings and route refill encounter to provider for approval or denial.

## 2024-08-20 ENCOUNTER — TRANSFERRED RECORDS (OUTPATIENT)
Dept: HEALTH INFORMATION MANAGEMENT | Facility: CLINIC | Age: 39
End: 2024-08-20
Payer: COMMERCIAL

## 2024-08-20 NOTE — PROGRESS NOTES
Medication Therapy Management (MTM) Encounter    ASSESSMENT:                            Medication Adherence/Access: No issues identified      Diabetes:   A1C  at goal <7%. No recent readings, but having symptoms of high blood glucose. Discussed switch to Mounjaro for more effective blood glucose reduction and weight loss. As patient already on Ozempic, will skip starting dose of Mounjaro. Will start CGM for blood glucose monitoring.       Appropriately not on statin due to age.        Endometrial Cancer:   As above, switch to Mounjaro for weight loss per Oncology recommendations.       Iron deficiency anemia due to chronic blood loss:  Last Hgb within normal limits.       Mental Health   Depression  Continued ups and downs. Feels this is related to job interviewing process. Declines need for medication adjustments at this time.       Low Vitamin D:   Last levels low. Did not get higher dose of supplement. Will resend today. Plan for repeat levels after about 12 weeks of therapy (end of November)     GERD:  Denies symptoms.     Wheezing:   Infrequent symptoms.      Rhinitis:   Well managed with PRN med use.     Constipation:   Improved with addition of Miralax.     PLAN:                            Start Freestyle Richard 3 to check blood glucose.   Stop Ozempic. Switch to Mounjaro 5 mg weekly.   Start Vitamin D3 5000 units daily, as previously prescribed.     Follow-up: Return in about 29 days (around 9/19/2024) for Medication Management Pharmacist, by phone.    SUBJECTIVE/OBJECTIVE:                          Monica Doshi is a 38 year old female seen for an follow-up visit. She was referred to me from Abril Martin MD. Today's visit is a follow-up MTM visit from 07/23/2024.      Reason for visit: Ozempic follow-up.     Allergies/ADRs: Reviewed in chart  Past Medical History: Reviewed in chart  Tobacco: She reports that she has been smoking cigarettes. She has a 1 pack-year smoking history. She has never used smokeless  "tobacco.Nicotine/Tobacco Cessation Plan  Information offered: Patient not interested at this time    Alcohol:  reports no history of alcohol use.     Medication Adherence/Access: no issues reported  Forgets appointments, but doesn't forget meds. Has them placed where she will see them throughout the day as a reminder to take doses.     Diabetes   Metformin 500 mg daily   At last MTM visit, switched from Bydureon to Ozempic 0.5 mg weekly.     Wondering if she can have a higher dose.   Since starting Ozempic, Feels blood glucose has increased. Hasn't been as well controlled. Felt symptoms of highs 1-2 times per week - headache. Drinks a lot of water and goes out for a walk to bring it down.     Hasn't weighed recently.     Current diabetes symptoms:  Headaches when high. No recent lows, but when she does have them generally feels weak tired.    Blood sugar monitoring:  Yesterday fasting was 120s. Other days didn't check because she was having              Hemoglobin A1C   Date Value Ref Range Status   05/10/2024 6.5 (H) 0.0 - 5.6 % Final     Comment:     Normal <5.7%   Prediabetes 5.7-6.4%    Diabetes 6.5% or higher     Note: Adopted from ADA consensus guidelines.   02/22/2024 7.0 (H) 0.0 - 5.6 % Final     Comment:     Normal <5.7%   Prediabetes 5.7-6.4%    Diabetes 6.5% or higher     Note: Adopted from ADA consensus guidelines.   10/07/2022 6.4 (H) 0.0 - 5.6 % Final     Comment:     Normal <5.7%   Prediabetes 5.7-6.4%    Diabetes 6.5% or higher     Note: Adopted from ADA consensus guidelines.      No results found for: \"MICROALBUR\"   Creatinine Urine mg/dL   Date Value Ref Range Status   02/22/2024 101.0 mg/dL Final     Comment:     The reference ranges have not been established in urine creatinine. The results should be integrated into the clinical context for interpretation.     LDL Cholesterol Calculated   Date Value Ref Range Status   02/22/2024 118 (H) <=100 mg/dL Final     Creatinine   Date Value Ref Range " Status   06/04/2024 0.55 0.51 - 0.95 mg/dL Final        Endometrial Cancer:   Treatment with Mirena IUD. Was recommended to work on weight loss to help with effects on endometrial lining.        Iron deficiency anemia due to chronic blood loss:  Ferrous gluconate 324 mg daily  Vitamin C 1000 mg daily - not using daily.     Hemoglobin   Date Value Ref Range Status   05/10/2024 15.1 11.7 - 15.7 g/dL Final   ]     Mental Health     Depression  Fluoxetine 40 mg once daily.   Trazodone 100 mg at bedtime     With cancer diagnosis, was very hard. Still up and down. Every 3-4 days, struggles with low mood for a day or so. In the process of looking for a job, and the interview process has been difficult.      Sleeping well with trazodone.     Feels things will get better if she can get back to work.          Low Vitamin D:   Vitamin D3 5000 units daily . Increased at last visit. Didn't get supplement. Told insurance doesn't cover.        Vitamin D Deficiency Screening Results:  Lab Results   Component Value Date    VITDT 16 (L) 02/22/2024    VITDT 24 10/07/2022         GERD:  Famotidine 20 mg twice daily  Omeprazole 20 mg daily     Had heartburn after eating. With medicine not having issues.       Wheezing:   Albuterol HFA 90 mcg 2 puffs every 4 hours as needed     Uses maybe once every 3-4 months.        Rhinitis:   Cetirizine 10 mg daily   Flonase 50 mcg 2 sprays each nostril daily     Allergy symptoms are well controlled.     Constipation:   At last visit, started Miralax 17 g powder daily.     Using just as needed.   Having bowel movements ever 1-2 days.            Today's Vitals: There were no vitals taken for this visit.  ----------------      I spent 24 minutes with this patient today. All changes were made via collaborative practice agreement with Abril Martin MD. A copy of the visit note was provided to the patient's provider(s).    A summary of these recommendations was sent via [employer].    Aaron Treadwell  PharmD  Medication Therapy Management (MTM) Pharmacist  [facility]      Telemedicine Visit Details  Type of service:  Telephone visit  Start Time: 10:46 AM   End Time: 11:10 AM            Medication Therapy Recommendations  Type 2 diabetes mellitus with microalbuminuria, without long-term current use of insulin (H)    Current Medication: metFORMIN (GLUCOPHAGE XR) 500 MG 24 hr tablet   Rationale: Medication requires monitoring - Needs additional monitoring   Recommendation: Self-Monitoring - FreeStyle Richard 3 Sensor Misc   Status: Accepted per CPA          Current Medication: semaglutide (OZEMPIC) 2 MG/3ML pen   Rationale: More effective medication available - Ineffective medication - Effectiveness   Recommendation: Change Medication - Mounjaro 5 MG/0.5ML Sopn   Status: Accepted per CPA         Vitamin D deficiency    Current Medication: cholecalciferol 125 MCG (5000 UT) CAPS   Rationale: Medication product not available - Adherence - Adherence   Recommendation: Provide Adherence Intervention   Status: Accepted per CPA

## 2024-08-21 ENCOUNTER — VIRTUAL VISIT (OUTPATIENT)
Dept: PHARMACY | Facility: CLINIC | Age: 39
End: 2024-08-21
Payer: COMMERCIAL

## 2024-08-21 ENCOUNTER — TELEPHONE (OUTPATIENT)
Dept: FAMILY MEDICINE | Facility: CLINIC | Age: 39
End: 2024-08-21
Payer: COMMERCIAL

## 2024-08-21 DIAGNOSIS — E55.9 VITAMIN D DEFICIENCY: ICD-10-CM

## 2024-08-21 DIAGNOSIS — D50.0 IRON DEFICIENCY ANEMIA DUE TO CHRONIC BLOOD LOSS: ICD-10-CM

## 2024-08-21 DIAGNOSIS — R80.9 TYPE 2 DIABETES MELLITUS WITH MICROALBUMINURIA, WITHOUT LONG-TERM CURRENT USE OF INSULIN (H): Primary | ICD-10-CM

## 2024-08-21 DIAGNOSIS — E11.29 TYPE 2 DIABETES MELLITUS WITH MICROALBUMINURIA, WITHOUT LONG-TERM CURRENT USE OF INSULIN (H): Primary | ICD-10-CM

## 2024-08-21 DIAGNOSIS — F33.42 MAJOR DEPRESSIVE DISORDER, RECURRENT, IN FULL REMISSION (H): ICD-10-CM

## 2024-08-21 DIAGNOSIS — K59.00 CONSTIPATION, UNSPECIFIED CONSTIPATION TYPE: ICD-10-CM

## 2024-08-21 DIAGNOSIS — J31.0 CHRONIC RHINITIS: ICD-10-CM

## 2024-08-21 DIAGNOSIS — R06.2 WHEEZING: ICD-10-CM

## 2024-08-21 DIAGNOSIS — C54.1 ENDOMETRIAL ADENOCARCINOMA (H): ICD-10-CM

## 2024-08-21 DIAGNOSIS — K21.00 GASTROESOPHAGEAL REFLUX DISEASE WITH ESOPHAGITIS WITHOUT HEMORRHAGE: ICD-10-CM

## 2024-08-21 PROCEDURE — 99606 MTMS BY PHARM EST 15 MIN: CPT | Mod: 93 | Performed by: PHARMACIST

## 2024-08-21 PROCEDURE — 99607 MTMS BY PHARM ADDL 15 MIN: CPT | Mod: 93 | Performed by: PHARMACIST

## 2024-08-21 RX ORDER — GLUCOSAMINE HCL/CHONDROITIN SU 500-400 MG
CAPSULE ORAL
Qty: 100 EACH | Refills: 11 | Status: SHIPPED | OUTPATIENT
Start: 2024-08-21

## 2024-08-21 RX ORDER — BLOOD SUGAR DIAGNOSTIC
STRIP MISCELLANEOUS
Qty: 100 STRIP | Refills: 1 | Status: SHIPPED | OUTPATIENT
Start: 2024-08-21

## 2024-08-21 RX ORDER — BLOOD-GLUCOSE SENSOR
1 EACH MISCELLANEOUS
Qty: 6 EACH | Refills: 5 | Status: SHIPPED | OUTPATIENT
Start: 2024-08-21

## 2024-08-21 RX ORDER — KETOROLAC TROMETHAMINE 30 MG/ML
1 INJECTION, SOLUTION INTRAMUSCULAR; INTRAVENOUS ONCE
Qty: 1 EACH | Refills: 0 | Status: SHIPPED | OUTPATIENT
Start: 2024-08-21 | End: 2024-08-21

## 2024-08-21 NOTE — PATIENT INSTRUCTIONS
"Recommendations from today's MTM visit:                                                         Start Freestyle Richard 3 to check blood glucose.   Stop Ozempic. Switch to Mounjaro 5 mg weekly.   Start Vitamin D3 5000 units daily, as previously prescribed.     Follow-up: Return in about 29 days (around 9/19/2024) for Medication Management Pharmacist, by phone.    It was great speaking with you today.  I value your experience and would be very thankful for your time in providing feedback in our clinic survey. In the next few days, you may receive an email or text message from [employer] with a link to a survey related to your  clinical pharmacist.\"     To schedule another MTM appointment, please call the clinic directly or you may call the MTM scheduling line at [phone].    My Clinical Pharmacist's contact information:                                                      Please feel free to contact me with any questions or concerns you have.      Aaron Treadwell, PharmD  Medication Therapy Management (MTM) Pharmacist  [facility]      "

## 2024-08-21 NOTE — TELEPHONE ENCOUNTER
MELIDA PA REQUEST    Please route outcome to MTM/RP: Aaron Treadwell    Prior Authorization Retail Medication Request    Medication/Dose: Mounjaro 5 mg   ICD code (if different than what is on RX):  NA  Previously Tried and Failed:  Bydureon, Ozempic. Currently on Metformin.     Insurance Name:  [facility]   Insurance ID:  [id number]

## 2024-08-22 NOTE — TELEPHONE ENCOUNTER
Central Prior Authorization Team  Phone: [phone]    PA Initiation    Medication: MOUNJARO 5 MG/0.5ML SC SOPN  Insurance Company:    Pharmacy Filling the Rx: [id number] - [place], [place] - [address] [place] [address]  Filling Pharmacy Phone: [phone]  Filling Pharmacy Fax:    Start Date: 8/22/2024

## 2024-08-23 NOTE — TELEPHONE ENCOUNTER
Central Prior Authorization Team  Phone: [phone]    Prior Authorization Approval    Medication: MOUNJARO 5 MG/0.5ML SC SOPN  Authorization Effective Date: 7/22/2024  Authorization Expiration Date: 8/22/2025  Approved Dose/Quantity:   Reference #:     Insurance Company:    Expected CoPay: $    CoPay Card Available:      Financial Assistance Needed:   Which Pharmacy is filling the prescription: [id number] - [place], [place] - [address]  Pharmacy Notified: yes  Patient Notified: PHARMACY WILL NOTIFY PT WHEN READY

## 2024-09-07 ENCOUNTER — MYC MEDICAL ADVICE (OUTPATIENT)
Dept: PHARMACY | Facility: CLINIC | Age: 39
End: 2024-09-07
Payer: COMMERCIAL

## 2024-09-07 ENCOUNTER — HEALTH MAINTENANCE LETTER (OUTPATIENT)
Age: 39
End: 2024-09-07

## 2024-09-07 DIAGNOSIS — E55.9 VITAMIN D DEFICIENCY: Primary | ICD-10-CM

## 2024-09-08 ENCOUNTER — TRANSFERRED RECORDS (OUTPATIENT)
Dept: HEALTH INFORMATION MANAGEMENT | Facility: CLINIC | Age: 39
End: 2024-09-08
Payer: COMMERCIAL

## 2024-09-09 RX ORDER — CHOLECALCIFEROL (VITAMIN D3) 50 MCG
2 TABLET ORAL DAILY
Qty: 60 TABLET | Refills: 5 | Status: SHIPPED | OUTPATIENT
Start: 2024-09-09

## 2024-09-18 NOTE — PROGRESS NOTES
Medication Therapy Management (MTM) Encounter    ASSESSMENT:                            Medication Adherence/Access: No issues identified      Diabetes:   A1C  at goal <7%. Has not switched to Mounjaro yet. Pa Approved. Rx re-sent today.     Appropriately not on statin due to age.        Endometrial Cancer:   As above, switch to Mounjaro for weight loss per Oncology recommendations.       Iron deficiency anemia due to chronic blood loss:  Last Hgb within normal limits.       Mental Health   Depression  Mood improved with starting new job. Continue to monitor.       Low Vitamin D:   Last levels low. Has not tried to fill 2000 unit prescription. Will have patient contact pharmacy to see if this is covered.     GERD:  Denies symptoms.     Wheezing:   Infrequent symptoms.      Rhinitis:   Well managed with PRN med use.     Constipation:   Improved with addition of Miralax.           PLAN:                            Start Mounjaro 5 mg weekly, as previously prescribed.   Start Vitamin D3 2000 units 2 tabs daily, as previously prescribed.     Follow-up: Return in about 4 weeks (around 10/17/2024) for Medication Management Pharmacist, in person.    SUBJECTIVE/OBJECTIVE:                          Monica Doshi is a 39 year old female seen for an follow-up visit. She was referred to me from Abril Martin MD. Today's visit is a follow-up MTM visit from 08/21/2024.      Reason for visit: Ozempic follow-up.     Allergies/ADRs: Reviewed in chart  Past Medical History: Reviewed in chart  Tobacco: She reports that she has been smoking cigarettes. She has a 1 pack-year smoking history. She has never used smokeless tobacco.Nicotine/Tobacco Cessation Plan  Information offered: Patient not interested at this time    Alcohol:  reports no history of alcohol use.     Medication Adherence/Access: no issues reported  Forgets appointments, but doesn't forget meds. Has them placed where she will see them throughout the day as a reminder to take  "doses.     Diabetes   Metformin 500 mg daily   At last MTM visit, switched from Ozempic to Mounjaro 5 mg weekly.       Hasn't started Mounjaro yet. It required a PA - patient didn't get it filled after approval.          Current diabetes symptoms:  Headaches when high. No recent lows, but when she does have them generally feels weak tired.    Blood sugar monitoring:  Yesterday fasting was 120s. Other days didn't check because she was having              Hemoglobin A1C   Date Value Ref Range Status   05/10/2024 6.5 (H) 0.0 - 5.6 % Final     Comment:     Normal <5.7%   Prediabetes 5.7-6.4%    Diabetes 6.5% or higher     Note: Adopted from ADA consensus guidelines.   02/22/2024 7.0 (H) 0.0 - 5.6 % Final     Comment:     Normal <5.7%   Prediabetes 5.7-6.4%    Diabetes 6.5% or higher     Note: Adopted from ADA consensus guidelines.   10/07/2022 6.4 (H) 0.0 - 5.6 % Final     Comment:     Normal <5.7%   Prediabetes 5.7-6.4%    Diabetes 6.5% or higher     Note: Adopted from ADA consensus guidelines.      No results found for: \"MICROALBUR\"   Creatinine Urine mg/dL   Date Value Ref Range Status   02/22/2024 101.0 mg/dL Final     Comment:     The reference ranges have not been established in urine creatinine. The results should be integrated into the clinical context for interpretation.     LDL Cholesterol Calculated   Date Value Ref Range Status   02/22/2024 118 (H) <=100 mg/dL Final     Creatinine   Date Value Ref Range Status   06/04/2024 0.55 0.51 - 0.95 mg/dL Final        Endometrial Cancer:   Treatment with Mirena IUD. Was recommended to work on weight loss to help with effects on endometrial lining.        Iron deficiency anemia due to chronic blood loss:  Ferrous gluconate 324 mg daily  Vitamin C 1000 mg daily - not using daily.     Hemoglobin   Date Value Ref Range Status   05/10/2024 15.1 11.7 - 15.7 g/dL Final   ]     Mental Health     Depression  Fluoxetine 40 mg once daily.   Trazodone 100 mg at bedtime     Has " started a new job. Has been keeping herself active and busy.   Going back to work has been helpful.     Started  at [employer].          Low Vitamin D:   Vitamin D3 2000 units  2 tabs daily .  Hasn't tried to get this filled at pharmacy.        Vitamin D Deficiency Screening Results:  Lab Results   Component Value Date    VITDT 16 (L) 02/22/2024    VITDT 24 10/07/2022         GERD:  Famotidine 20 mg twice daily  Omeprazole 20 mg daily     Had heartburn after eating. With medicine not having issues.       Wheezing:   Albuterol HFA 90 mcg 2 puffs every 4 hours as needed     Uses maybe once every 3-4 months.        Rhinitis:   Cetirizine 10 mg daily   Flonase 50 mcg 2 sprays each nostril daily     Allergy symptoms are well controlled.     Constipation:   At last visit, started Miralax 17 g powder daily.     Using just as needed.   Having bowel movements ever 1-2 days.            Today's Vitals: There were no vitals taken for this visit.  ----------------      I spent 8 minutes with this patient today. All changes were made via collaborative practice agreement with Abril Martin MD. A copy of the visit note was provided to the patient's provider(s).    A summary of these recommendations was sent via [employer].    Christine JimenezD  Medication Therapy Management (MTM) Pharmacist  [facility]      Telemedicine Visit Details  Type of service:  Telephone visit  Start Time: 2:24 PM   End Time: 2:42 PM               Medication Therapy Recommendations  Type 2 diabetes mellitus with microalbuminuria, without long-term current use of insulin (H)    Current Medication: tirzepatide (MOUNJARO) 5 MG/0.5ML pen   Rationale: Medication product not available - Adherence - Adherence   Recommendation: Provide Adherence Intervention   Status: Accepted - no CPA Needed

## 2024-09-19 ENCOUNTER — VIRTUAL VISIT (OUTPATIENT)
Dept: PHARMACY | Facility: OTHER | Age: 39
End: 2024-09-19
Payer: COMMERCIAL

## 2024-09-19 DIAGNOSIS — J31.0 CHRONIC RHINITIS: ICD-10-CM

## 2024-09-19 DIAGNOSIS — D50.0 IRON DEFICIENCY ANEMIA DUE TO CHRONIC BLOOD LOSS: ICD-10-CM

## 2024-09-19 DIAGNOSIS — R80.9 TYPE 2 DIABETES MELLITUS WITH MICROALBUMINURIA, WITHOUT LONG-TERM CURRENT USE OF INSULIN (H): Primary | ICD-10-CM

## 2024-09-19 DIAGNOSIS — E55.9 VITAMIN D DEFICIENCY: ICD-10-CM

## 2024-09-19 DIAGNOSIS — C54.1 ENDOMETRIAL ADENOCARCINOMA (H): ICD-10-CM

## 2024-09-19 DIAGNOSIS — E11.29 TYPE 2 DIABETES MELLITUS WITH MICROALBUMINURIA, WITHOUT LONG-TERM CURRENT USE OF INSULIN (H): Primary | ICD-10-CM

## 2024-09-19 DIAGNOSIS — K59.00 CONSTIPATION, UNSPECIFIED CONSTIPATION TYPE: ICD-10-CM

## 2024-09-19 DIAGNOSIS — K21.00 GASTROESOPHAGEAL REFLUX DISEASE WITH ESOPHAGITIS WITHOUT HEMORRHAGE: ICD-10-CM

## 2024-09-19 DIAGNOSIS — R06.2 WHEEZING: ICD-10-CM

## 2024-09-19 DIAGNOSIS — F33.42 MAJOR DEPRESSIVE DISORDER, RECURRENT, IN FULL REMISSION (H): ICD-10-CM

## 2024-09-19 PROCEDURE — 99607 MTMS BY PHARM ADDL 15 MIN: CPT | Mod: 93 | Performed by: PHARMACIST

## 2024-09-19 PROCEDURE — 99606 MTMS BY PHARM EST 15 MIN: CPT | Mod: 93 | Performed by: PHARMACIST

## 2024-09-19 NOTE — PATIENT INSTRUCTIONS
"Recommendations from today's MTM visit:                                                         Start Mounjaro 5 mg weekly, as previously prescribed.   Start Vitamin D3 2000 units 2 tabs daily, as previously prescribed.     Follow-up: Return in about 4 weeks (around 10/17/2024) for Medication Management Pharmacist, in person.    It was great speaking with you today.  I value your experience and would be very thankful for your time in providing feedback in our clinic survey. In the next few days, you may receive an email or text message from [employer] with a link to a survey related to your  clinical pharmacist.\"     To schedule another MTM appointment, please call the clinic directly or you may call the MTM scheduling line at [phone].    My Clinical Pharmacist's contact information:                                                      Please feel free to contact me with any questions or concerns you have.      Aaron Treadwell, PharmD  Medication Therapy Management (MTM) Pharmacist  [facility]      "

## 2024-10-10 ENCOUNTER — TELEPHONE (OUTPATIENT)
Dept: ALLERGY | Facility: CLINIC | Age: 39
End: 2024-10-10

## 2024-10-10 ENCOUNTER — DOCUMENTATION ONLY (OUTPATIENT)
Dept: ALLERGY | Facility: CLINIC | Age: 39
End: 2024-10-10

## 2024-10-10 DIAGNOSIS — Z53.9 NO SHOW: Primary | ICD-10-CM

## 2024-10-15 DIAGNOSIS — E11.29 TYPE 2 DIABETES MELLITUS WITH MICROALBUMINURIA, WITHOUT LONG-TERM CURRENT USE OF INSULIN (H): ICD-10-CM

## 2024-10-15 DIAGNOSIS — R80.9 TYPE 2 DIABETES MELLITUS WITH MICROALBUMINURIA, WITHOUT LONG-TERM CURRENT USE OF INSULIN (H): ICD-10-CM

## 2024-10-15 DIAGNOSIS — Z76.0 ENCOUNTER FOR MEDICATION REFILL: ICD-10-CM

## 2024-10-15 RX ORDER — METFORMIN HYDROCHLORIDE 500 MG/1
500 TABLET, EXTENDED RELEASE ORAL
Qty: 90 TABLET | Refills: 0 | Status: SHIPPED | OUTPATIENT
Start: 2024-10-15

## 2024-11-04 ENCOUNTER — MYC MEDICAL ADVICE (OUTPATIENT)
Dept: PHARMACY | Facility: OTHER | Age: 39
End: 2024-11-04
Payer: COMMERCIAL

## 2024-11-04 ENCOUNTER — MYC REFILL (OUTPATIENT)
Dept: FAMILY MEDICINE | Facility: CLINIC | Age: 39
End: 2024-11-04
Payer: COMMERCIAL

## 2024-11-04 DIAGNOSIS — F33.42 MAJOR DEPRESSIVE DISORDER, RECURRENT, IN FULL REMISSION (H): ICD-10-CM

## 2024-11-04 DIAGNOSIS — E11.29 TYPE 2 DIABETES MELLITUS WITH MICROALBUMINURIA, WITHOUT LONG-TERM CURRENT USE OF INSULIN (H): ICD-10-CM

## 2024-11-04 DIAGNOSIS — R80.9 TYPE 2 DIABETES MELLITUS WITH MICROALBUMINURIA, WITHOUT LONG-TERM CURRENT USE OF INSULIN (H): ICD-10-CM

## 2024-11-04 RX ORDER — FLUOXETINE 40 MG/1
40 CAPSULE ORAL DAILY
Qty: 90 CAPSULE | Refills: 1 | Status: SHIPPED | OUTPATIENT
Start: 2024-11-04

## 2024-11-04 RX ORDER — FLUOXETINE 40 MG/1
40 CAPSULE ORAL DAILY
Qty: 90 CAPSULE | Refills: 4 | Status: CANCELLED | OUTPATIENT
Start: 2024-11-04

## 2024-11-05 RX ORDER — CONTAINER,EMPTY
1 EACH MISCELLANEOUS PRN
Qty: 1 EACH | Refills: 1 | Status: SHIPPED | OUTPATIENT
Start: 2024-11-05

## 2024-11-05 RX ORDER — TRAZODONE HYDROCHLORIDE 100 MG/1
100 TABLET ORAL AT BEDTIME
Qty: 90 TABLET | Refills: 4 | Status: SHIPPED | OUTPATIENT
Start: 2024-11-05

## 2024-11-23 ENCOUNTER — MYC MEDICAL ADVICE (OUTPATIENT)
Dept: PHARMACY | Facility: OTHER | Age: 39
End: 2024-11-23
Payer: COMMERCIAL

## 2024-11-23 DIAGNOSIS — R80.9 TYPE 2 DIABETES MELLITUS WITH MICROALBUMINURIA, WITHOUT LONG-TERM CURRENT USE OF INSULIN (H): ICD-10-CM

## 2024-11-23 DIAGNOSIS — E11.29 TYPE 2 DIABETES MELLITUS WITH MICROALBUMINURIA, WITHOUT LONG-TERM CURRENT USE OF INSULIN (H): ICD-10-CM

## 2025-01-05 ENCOUNTER — HEALTH MAINTENANCE LETTER (OUTPATIENT)
Age: 40
End: 2025-01-05

## 2025-02-05 DIAGNOSIS — K21.00 GASTROESOPHAGEAL REFLUX DISEASE WITH ESOPHAGITIS WITHOUT HEMORRHAGE: ICD-10-CM

## 2025-02-05 DIAGNOSIS — Z76.0 ENCOUNTER FOR MEDICATION REFILL: ICD-10-CM

## 2025-02-05 DIAGNOSIS — G89.29 CHRONIC LOW BACK PAIN WITH SCIATICA, SCIATICA LATERALITY UNSPECIFIED, UNSPECIFIED BACK PAIN LATERALITY: ICD-10-CM

## 2025-02-05 DIAGNOSIS — M54.40 CHRONIC LOW BACK PAIN WITH SCIATICA, SCIATICA LATERALITY UNSPECIFIED, UNSPECIFIED BACK PAIN LATERALITY: ICD-10-CM

## 2025-02-05 DIAGNOSIS — K21.9 GASTROESOPHAGEAL REFLUX DISEASE, UNSPECIFIED WHETHER ESOPHAGITIS PRESENT: ICD-10-CM

## 2025-02-05 DIAGNOSIS — E11.29 TYPE 2 DIABETES MELLITUS WITH MICROALBUMINURIA, WITHOUT LONG-TERM CURRENT USE OF INSULIN (H): ICD-10-CM

## 2025-02-05 DIAGNOSIS — K59.00 CONSTIPATION, UNSPECIFIED CONSTIPATION TYPE: ICD-10-CM

## 2025-02-05 DIAGNOSIS — F33.42 MAJOR DEPRESSIVE DISORDER, RECURRENT, IN FULL REMISSION: ICD-10-CM

## 2025-02-05 DIAGNOSIS — R80.9 TYPE 2 DIABETES MELLITUS WITH MICROALBUMINURIA, WITHOUT LONG-TERM CURRENT USE OF INSULIN (H): ICD-10-CM

## 2025-02-05 RX ORDER — OMEPRAZOLE 20 MG/1
20 CAPSULE, DELAYED RELEASE ORAL DAILY
Qty: 90 CAPSULE | Refills: 0 | Status: SHIPPED | OUTPATIENT
Start: 2025-02-05

## 2025-02-05 RX ORDER — FLUOXETINE HYDROCHLORIDE 40 MG/1
40 CAPSULE ORAL DAILY
Qty: 90 CAPSULE | Refills: 0 | Status: SHIPPED | OUTPATIENT
Start: 2025-02-05

## 2025-02-05 RX ORDER — FAMOTIDINE 20 MG/1
20 TABLET, FILM COATED ORAL 2 TIMES DAILY
Qty: 180 TABLET | Refills: 0 | Status: SHIPPED | OUTPATIENT
Start: 2025-02-05

## 2025-02-05 RX ORDER — PSEUDOEPHED/ACETAMINOPH/DIPHEN 30MG-500MG
1000 TABLET ORAL EVERY 6 HOURS PRN
Qty: 200 TABLET | Refills: 1 | Status: SHIPPED | OUTPATIENT
Start: 2025-02-05

## 2025-02-05 RX ORDER — METFORMIN HYDROCHLORIDE 500 MG/1
500 TABLET, EXTENDED RELEASE ORAL
Qty: 90 TABLET | Refills: 0 | Status: SHIPPED | OUTPATIENT
Start: 2025-02-05

## 2025-02-05 RX ORDER — POLYETHYLENE GLYCOL 3350 17 G/17G
1 POWDER, FOR SOLUTION ORAL DAILY
Qty: 578 G | Refills: 1 | Status: SHIPPED | OUTPATIENT
Start: 2025-02-05

## 2025-02-05 NOTE — TELEPHONE ENCOUNTER
Future Appointments   Date Time Provider Department Center   2/14/2025 10:50 AM MICUS1 [facility]   2/14/2025 11:30 AM MICMR1 [facility]   3/21/2025  8:00 AM Abril Martin MD [facility]      Health Maintenance Due   Topic Date Due    DIABETIC FOOT EXAM  Never done    DEPRESSION ACTION PLAN  Never done    MAMMO SCREENING  Never done    Pneumococcal Vaccine: Pediatrics (0 to 5 Years) and At-Risk Patients (6 to 49 Years) (1 of 2 - PCV) Never done    PAP  05/21/2017    DTAP/TDAP/TD IMMUNIZATION (2 - Td or Tdap) 09/17/2019    YEARLY PREVENTIVE VISIT  04/30/2022    ASTHMA ACTION PLAN  10/07/2023    A1C  08/10/2024    INFLUENZA VACCINE (1) 09/01/2024    COVID-19 Vaccine (4 - 2024-25 season) 09/01/2024    ASTHMA CONTROL TEST  11/10/2024    PHQ-9  11/10/2024    ANNUAL REVIEW OF HM ORDERS  01/18/2025    LIPID  02/22/2025    MICROALBUMIN  02/22/2025     BP Readings from Last 3 Encounters:   06/07/24 123/84   06/04/24 125/86   02/22/24 119/75     Moinca was seen today for refill request.    Diagnoses and all orders for this visit:    Major depressive disorder, recurrent, in full remission    Encounter for medication refill    Type 2 diabetes mellitus with microalbuminuria, without long-term current use of insulin (H)    Gastroesophageal reflux disease, unspecified whether esophagitis present  -     famotidine (PEPCID) 20 MG tablet; Take 1 tablet (20 mg) by mouth 2 times daily.    Constipation, unspecified constipation type  -     polyethylene glycol (MIRALAX) 17 GM/Dose powder; Take 17 g (1 Capful) by mouth daily.    Gastroesophageal reflux disease with esophagitis without hemorrhage  -     omeprazole (PRILOSEC) 20 MG DR capsule; Take 1 capsule (20 mg) by mouth daily.    Chronic low back pain with sciatica, sciatica laterality unspecified, unspecified back pain laterality  -     acetaminophen (TYLENOL) 500 MG tablet; Take 2 tablets (1,000 mg) by mouth every 6 hours as needed for mild pain.

## 2025-02-05 NOTE — TELEPHONE ENCOUNTER
Dr. Martin --    Please review and advise: Mounjaro dose increase.     Patient is scheduled to see you on 3/21/2025.   Patient requesting the increased dose of Mounjaro.   If not able to increase, patient would like refill for 7.5 mg sent to pharmacy.     Please call patient with information on refill. Ok to [facility].     REFILL POOL.   Pended below.     ANABELA BlancoN RN  [facility]

## 2025-04-20 ENCOUNTER — HEALTH MAINTENANCE LETTER (OUTPATIENT)
Age: 40
End: 2025-04-20

## 2025-05-30 ENCOUNTER — HOSPITAL ENCOUNTER (OUTPATIENT)
Dept: MRI IMAGING | Facility: HOSPITAL | Age: 40
Discharge: HOME | End: 2025-05-30
Attending: OBSTETRICS & GYNECOLOGY
Payer: MEDICAID

## 2025-05-30 ENCOUNTER — HOSPITAL ENCOUNTER (OUTPATIENT)
Dept: ULTRASOUND IMAGING | Facility: HOSPITAL | Age: 40
Discharge: HOME | End: 2025-05-30
Attending: OBSTETRICS & GYNECOLOGY
Payer: MEDICAID

## 2025-05-30 DIAGNOSIS — C54.1 ENDOMETRIAL CANCER (H): ICD-10-CM

## 2025-05-30 PROCEDURE — 76830 TRANSVAGINAL US NON-OB: CPT

## 2025-05-30 PROCEDURE — 255N000002 HC RX 255 OP 636: Performed by: OBSTETRICS & GYNECOLOGY

## 2025-05-30 PROCEDURE — A9585 GADOBUTROL INJECTION: HCPCS | Performed by: OBSTETRICS & GYNECOLOGY

## 2025-05-30 PROCEDURE — 72197 MRI PELVIS W/O & W/DYE: CPT

## 2025-05-30 RX ORDER — GADOBUTROL 604.72 MG/ML
0.1 INJECTION INTRAVENOUS ONCE
Status: COMPLETED | OUTPATIENT
Start: 2025-05-30 | End: 2025-05-30

## 2025-05-30 RX ADMIN — GADOBUTROL 7.5 ML: 604.72 INJECTION INTRAVENOUS at 17:17

## 2025-07-09 ENCOUNTER — HOSPITAL ENCOUNTER (INPATIENT)
Facility: HOSPITAL | Age: 40
DRG: 740 | End: 2025-07-09
Payer: COMMERCIAL

## 2025-07-09 ENCOUNTER — APPOINTMENT (OUTPATIENT)
Dept: CT IMAGING | Facility: HOSPITAL | Age: 40
DRG: 740 | End: 2025-07-09
Payer: COMMERCIAL

## 2025-07-09 DIAGNOSIS — B18.1 CHRONIC HEPATITIS B (H): Primary | ICD-10-CM

## 2025-07-09 DIAGNOSIS — N93.9 VAGINAL BLEEDING: ICD-10-CM

## 2025-07-09 DIAGNOSIS — C54.1 ENDOMETRIAL CANCER (H): ICD-10-CM

## 2025-07-09 DIAGNOSIS — C54.1 ENDOMETRIAL ADENOCARCINOMA (H): ICD-10-CM

## 2025-07-09 DIAGNOSIS — D62 ANEMIA DUE TO BLOOD LOSS, ACUTE: ICD-10-CM

## 2025-07-09 DIAGNOSIS — R91.1 PULMONARY NODULE: ICD-10-CM

## 2025-07-09 LAB
ABO + RH BLD: NORMAL
ANION GAP SERPL CALCULATED.3IONS-SCNC: 13 MMOL/L (ref 7–15)
B-OH-BUTYR SERPL-SCNC: <0.18 MMOL/L
BASOPHILS # BLD AUTO: 0 10E3/UL (ref 0–0.2)
BASOPHILS NFR BLD AUTO: 0 %
BLD GP AB SCN SERPL QL: NEGATIVE
BLD PROD TYP BPU: NORMAL
BLD PROD TYP BPU: NORMAL
BLOOD COMPONENT TYPE: NORMAL
BLOOD COMPONENT TYPE: NORMAL
BUN SERPL-MCNC: 16.5 MG/DL (ref 6–20)
CALCIUM SERPL-MCNC: 9.6 MG/DL (ref 8.8–10.4)
CHLORIDE SERPL-SCNC: 103 MMOL/L (ref 98–107)
CODING SYSTEM: NORMAL
CODING SYSTEM: NORMAL
CREAT BLD-MCNC: 0.8 MG/DL (ref 0.5–1)
CREAT SERPL-MCNC: 0.8 MG/DL (ref 0.51–0.95)
CROSSMATCH: NORMAL
CROSSMATCH: NORMAL
EGFRCR SERPLBLD CKD-EPI 2021: >60 ML/MIN/1.73M2
EGFRCR SERPLBLD CKD-EPI 2021: >90 ML/MIN/1.73M2
EOSINOPHIL # BLD AUTO: 0.1 10E3/UL (ref 0–0.7)
EOSINOPHIL NFR BLD AUTO: 0 %
ERYTHROCYTE [DISTWIDTH] IN BLOOD BY AUTOMATED COUNT: 18.4 % (ref 10–15)
ERYTHROCYTE [DISTWIDTH] IN BLOOD BY AUTOMATED COUNT: 18.6 % (ref 10–15)
GLUCOSE SERPL-MCNC: 253 MG/DL (ref 70–99)
HCO3 SERPL-SCNC: 18 MMOL/L (ref 22–29)
HCT VFR BLD AUTO: 19.4 % (ref 35–47)
HCT VFR BLD AUTO: 25.1 % (ref 35–47)
HGB BLD-MCNC: 5.7 G/DL (ref 11.7–15.7)
HGB BLD-MCNC: 7.7 G/DL (ref 11.7–15.7)
HOLD SPECIMEN: NORMAL
IMM GRANULOCYTES # BLD: 0.1 10E3/UL
IMM GRANULOCYTES NFR BLD: 1 %
INR PPP: 1.04 (ref 0.85–1.15)
ISSUE DATE AND TIME: NORMAL
ISSUE DATE AND TIME: NORMAL
LACTATE SERPL-SCNC: 1.2 MMOL/L (ref 0.7–2)
LYMPHOCYTES # BLD AUTO: 2.4 10E3/UL (ref 0.8–5.3)
LYMPHOCYTES NFR BLD AUTO: 18 %
MAGNESIUM SERPL-MCNC: 2 MG/DL (ref 1.7–2.3)
MCH RBC QN AUTO: 20.5 PG (ref 26.5–33)
MCH RBC QN AUTO: 23 PG (ref 26.5–33)
MCHC RBC AUTO-ENTMCNC: 29.4 G/DL (ref 31.5–36.5)
MCHC RBC AUTO-ENTMCNC: 30.7 G/DL (ref 31.5–36.5)
MCV RBC AUTO: 70 FL (ref 78–100)
MCV RBC AUTO: 75 FL (ref 78–100)
MONOCYTES # BLD AUTO: 0.5 10E3/UL (ref 0–1.3)
MONOCYTES NFR BLD AUTO: 4 %
NEUTROPHILS # BLD AUTO: 9.8 10E3/UL (ref 1.6–8.3)
NEUTROPHILS NFR BLD AUTO: 76 %
NRBC # BLD AUTO: 0.1 10E3/UL
NRBC BLD AUTO-RTO: 0 /100
PHOSPHATE SERPL-MCNC: 2.7 MG/DL (ref 2.5–4.5)
PLATELET # BLD AUTO: 285 10E3/UL (ref 150–450)
PLATELET # BLD AUTO: 361 10E3/UL (ref 150–450)
POTASSIUM SERPL-SCNC: 4.2 MMOL/L (ref 3.4–5.3)
POTASSIUM SERPL-SCNC: 4.3 MMOL/L (ref 3.4–5.3)
PROTHROMBIN TIME: 13.9 SECONDS (ref 11.8–14.8)
RBC # BLD AUTO: 2.78 10E6/UL (ref 3.8–5.2)
RBC # BLD AUTO: 3.35 10E6/UL (ref 3.8–5.2)
SODIUM SERPL-SCNC: 134 MMOL/L (ref 135–145)
SPECIMEN EXP DATE BLD: NORMAL
UNIT ABO/RH: NORMAL
UNIT ABO/RH: NORMAL
UNIT NUMBER: NORMAL
UNIT NUMBER: NORMAL
UNIT STATUS: NORMAL
UNIT STATUS: NORMAL
UNIT TYPE ISBT: 6200
UNIT TYPE ISBT: 6200
WBC # BLD AUTO: 10.8 10E3/UL (ref 4–11)
WBC # BLD AUTO: 12.9 10E3/UL (ref 4–11)

## 2025-07-09 PROCEDURE — 86850 RBC ANTIBODY SCREEN: CPT

## 2025-07-09 PROCEDURE — 250N000013 HC RX MED GY IP 250 OP 250 PS 637: Performed by: STUDENT IN AN ORGANIZED HEALTH CARE EDUCATION/TRAINING PROGRAM

## 2025-07-09 PROCEDURE — 82010 KETONE BODYS QUAN: CPT | Performed by: STUDENT IN AN ORGANIZED HEALTH CARE EDUCATION/TRAINING PROGRAM

## 2025-07-09 PROCEDURE — 86709 HEPATITIS A IGM ANTIBODY: CPT | Performed by: INTERNAL MEDICINE

## 2025-07-09 PROCEDURE — 86923 COMPATIBILITY TEST ELECTRIC: CPT

## 2025-07-09 PROCEDURE — 36430 TRANSFUSION BLD/BLD COMPNT: CPT

## 2025-07-09 PROCEDURE — 250N000011 HC RX IP 250 OP 636

## 2025-07-09 PROCEDURE — 83735 ASSAY OF MAGNESIUM: CPT | Performed by: STUDENT IN AN ORGANIZED HEALTH CARE EDUCATION/TRAINING PROGRAM

## 2025-07-09 PROCEDURE — 250N000011 HC RX IP 250 OP 636: Performed by: STUDENT IN AN ORGANIZED HEALTH CARE EDUCATION/TRAINING PROGRAM

## 2025-07-09 PROCEDURE — 85025 COMPLETE CBC W/AUTO DIFF WBC: CPT

## 2025-07-09 PROCEDURE — 99223 1ST HOSP IP/OBS HIGH 75: CPT | Performed by: STUDENT IN AN ORGANIZED HEALTH CARE EDUCATION/TRAINING PROGRAM

## 2025-07-09 PROCEDURE — 36415 COLL VENOUS BLD VENIPUNCTURE: CPT | Performed by: STUDENT IN AN ORGANIZED HEALTH CARE EDUCATION/TRAINING PROGRAM

## 2025-07-09 PROCEDURE — 96374 THER/PROPH/DIAG INJ IV PUSH: CPT | Mod: 59

## 2025-07-09 PROCEDURE — 36415 COLL VENOUS BLD VENIPUNCTURE: CPT

## 2025-07-09 PROCEDURE — 82565 ASSAY OF CREATININE: CPT

## 2025-07-09 PROCEDURE — 82374 ASSAY BLOOD CARBON DIOXIDE: CPT

## 2025-07-09 PROCEDURE — 74174 CTA ABD&PLVS W/CONTRAST: CPT

## 2025-07-09 PROCEDURE — 84132 ASSAY OF SERUM POTASSIUM: CPT | Performed by: STUDENT IN AN ORGANIZED HEALTH CARE EDUCATION/TRAINING PROGRAM

## 2025-07-09 PROCEDURE — 83605 ASSAY OF LACTIC ACID: CPT | Performed by: STUDENT IN AN ORGANIZED HEALTH CARE EDUCATION/TRAINING PROGRAM

## 2025-07-09 PROCEDURE — 99285 EMERGENCY DEPT VISIT HI MDM: CPT | Mod: 25

## 2025-07-09 PROCEDURE — 85610 PROTHROMBIN TIME: CPT

## 2025-07-09 PROCEDURE — 96375 TX/PRO/DX INJ NEW DRUG ADDON: CPT

## 2025-07-09 PROCEDURE — P9016 RBC LEUKOCYTES REDUCED: HCPCS

## 2025-07-09 PROCEDURE — 85049 AUTOMATED PLATELET COUNT: CPT | Performed by: STUDENT IN AN ORGANIZED HEALTH CARE EDUCATION/TRAINING PROGRAM

## 2025-07-09 PROCEDURE — 250N000009 HC RX 250

## 2025-07-09 PROCEDURE — 258N000003 HC RX IP 258 OP 636

## 2025-07-09 PROCEDURE — 120N000001 HC R&B MED SURG/OB

## 2025-07-09 PROCEDURE — 86923 COMPATIBILITY TEST ELECTRIC: CPT | Performed by: INTERNAL MEDICINE

## 2025-07-09 PROCEDURE — 84100 ASSAY OF PHOSPHORUS: CPT | Performed by: STUDENT IN AN ORGANIZED HEALTH CARE EDUCATION/TRAINING PROGRAM

## 2025-07-09 RX ORDER — ACETAMINOPHEN 325 MG/1
650 TABLET ORAL EVERY 4 HOURS PRN
Status: DISCONTINUED | OUTPATIENT
Start: 2025-07-09 | End: 2025-07-17 | Stop reason: HOSPADM

## 2025-07-09 RX ORDER — TRANEXAMIC ACID 10 MG/ML
1 INJECTION, SOLUTION INTRAVENOUS ONCE
Status: COMPLETED | OUTPATIENT
Start: 2025-07-09 | End: 2025-07-09

## 2025-07-09 RX ORDER — ACETAMINOPHEN 650 MG/1
650 SUPPOSITORY RECTAL EVERY 4 HOURS PRN
Status: DISCONTINUED | OUTPATIENT
Start: 2025-07-09 | End: 2025-07-17 | Stop reason: HOSPADM

## 2025-07-09 RX ORDER — NALOXONE HYDROCHLORIDE 0.4 MG/ML
0.2 INJECTION, SOLUTION INTRAMUSCULAR; INTRAVENOUS; SUBCUTANEOUS
Status: DISCONTINUED | OUTPATIENT
Start: 2025-07-09 | End: 2025-07-17 | Stop reason: HOSPADM

## 2025-07-09 RX ORDER — ONDANSETRON 4 MG/1
4 TABLET, ORALLY DISINTEGRATING ORAL EVERY 6 HOURS PRN
Status: DISCONTINUED | OUTPATIENT
Start: 2025-07-09 | End: 2025-07-17 | Stop reason: HOSPADM

## 2025-07-09 RX ORDER — FAMOTIDINE 20 MG/1
20 TABLET, FILM COATED ORAL 2 TIMES DAILY
Status: DISCONTINUED | OUTPATIENT
Start: 2025-07-10 | End: 2025-07-17 | Stop reason: HOSPADM

## 2025-07-09 RX ORDER — NALOXONE HYDROCHLORIDE 0.4 MG/ML
0.4 INJECTION, SOLUTION INTRAMUSCULAR; INTRAVENOUS; SUBCUTANEOUS
Status: DISCONTINUED | OUTPATIENT
Start: 2025-07-09 | End: 2025-07-17 | Stop reason: HOSPADM

## 2025-07-09 RX ORDER — FLUTICASONE PROPIONATE 50 MCG
2 SPRAY, SUSPENSION (ML) NASAL DAILY
Status: DISCONTINUED | OUTPATIENT
Start: 2025-07-10 | End: 2025-07-17 | Stop reason: HOSPADM

## 2025-07-09 RX ORDER — AMOXICILLIN 250 MG
1 CAPSULE ORAL 2 TIMES DAILY PRN
Status: DISCONTINUED | OUTPATIENT
Start: 2025-07-09 | End: 2025-07-17 | Stop reason: HOSPADM

## 2025-07-09 RX ORDER — LIDOCAINE 40 MG/G
CREAM TOPICAL
Status: DISCONTINUED | OUTPATIENT
Start: 2025-07-09 | End: 2025-07-17 | Stop reason: HOSPADM

## 2025-07-09 RX ORDER — ONDANSETRON 2 MG/ML
4 INJECTION INTRAMUSCULAR; INTRAVENOUS EVERY 6 HOURS PRN
Status: DISCONTINUED | OUTPATIENT
Start: 2025-07-09 | End: 2025-07-17 | Stop reason: HOSPADM

## 2025-07-09 RX ORDER — ONDANSETRON 2 MG/ML
4 INJECTION INTRAMUSCULAR; INTRAVENOUS EVERY 30 MIN PRN
Status: DISCONTINUED | OUTPATIENT
Start: 2025-07-09 | End: 2025-07-09

## 2025-07-09 RX ORDER — FERROUS GLUCONATE 324(38)MG
324 TABLET ORAL EVERY OTHER DAY
Status: DISCONTINUED | OUTPATIENT
Start: 2025-07-10 | End: 2025-07-17 | Stop reason: HOSPADM

## 2025-07-09 RX ORDER — TRAZODONE HYDROCHLORIDE 50 MG/1
100 TABLET ORAL AT BEDTIME
Status: DISCONTINUED | OUTPATIENT
Start: 2025-07-10 | End: 2025-07-17 | Stop reason: HOSPADM

## 2025-07-09 RX ORDER — IOPAMIDOL 755 MG/ML
90 INJECTION, SOLUTION INTRAVASCULAR ONCE
Status: COMPLETED | OUTPATIENT
Start: 2025-07-09 | End: 2025-07-09

## 2025-07-09 RX ORDER — NICOTINE POLACRILEX 4 MG
15-30 LOZENGE BUCCAL
Status: DISCONTINUED | OUTPATIENT
Start: 2025-07-09 | End: 2025-07-17 | Stop reason: HOSPADM

## 2025-07-09 RX ORDER — DEXTROSE MONOHYDRATE 25 G/50ML
25-50 INJECTION, SOLUTION INTRAVENOUS
Status: DISCONTINUED | OUTPATIENT
Start: 2025-07-09 | End: 2025-07-17 | Stop reason: HOSPADM

## 2025-07-09 RX ORDER — MULTIVIT WITH MINERALS/LUTEIN
1000 TABLET ORAL DAILY
Status: DISCONTINUED | OUTPATIENT
Start: 2025-07-10 | End: 2025-07-17 | Stop reason: HOSPADM

## 2025-07-09 RX ORDER — OXYCODONE HYDROCHLORIDE 5 MG/1
10 TABLET ORAL EVERY 4 HOURS PRN
Refills: 0 | Status: DISCONTINUED | OUTPATIENT
Start: 2025-07-09 | End: 2025-07-15

## 2025-07-09 RX ORDER — OMEPRAZOLE 20 MG/1
20 CAPSULE, DELAYED RELEASE ORAL DAILY
Status: DISCONTINUED | OUTPATIENT
Start: 2025-07-10 | End: 2025-07-10 | Stop reason: ALTCHOICE

## 2025-07-09 RX ORDER — AMOXICILLIN 250 MG
2 CAPSULE ORAL 2 TIMES DAILY PRN
Status: DISCONTINUED | OUTPATIENT
Start: 2025-07-09 | End: 2025-07-17 | Stop reason: HOSPADM

## 2025-07-09 RX ORDER — CETIRIZINE HYDROCHLORIDE 10 MG/1
10 TABLET ORAL DAILY
Status: DISCONTINUED | OUTPATIENT
Start: 2025-07-10 | End: 2025-07-17 | Stop reason: HOSPADM

## 2025-07-09 RX ORDER — POLYETHYLENE GLYCOL 3350 17 G/17G
17 POWDER, FOR SOLUTION ORAL 2 TIMES DAILY PRN
Status: DISCONTINUED | OUTPATIENT
Start: 2025-07-09 | End: 2025-07-17 | Stop reason: HOSPADM

## 2025-07-09 RX ORDER — HYDROMORPHONE HYDROCHLORIDE 1 MG/ML
0.5 INJECTION, SOLUTION INTRAMUSCULAR; INTRAVENOUS; SUBCUTANEOUS ONCE
Refills: 0 | Status: COMPLETED | OUTPATIENT
Start: 2025-07-09 | End: 2025-07-09

## 2025-07-09 RX ORDER — OXYCODONE HYDROCHLORIDE 5 MG/1
5 TABLET ORAL EVERY 4 HOURS PRN
Refills: 0 | Status: DISCONTINUED | OUTPATIENT
Start: 2025-07-09 | End: 2025-07-15

## 2025-07-09 RX ORDER — VITAMIN B COMPLEX
100 TABLET ORAL DAILY
Status: DISCONTINUED | OUTPATIENT
Start: 2025-07-10 | End: 2025-07-17 | Stop reason: HOSPADM

## 2025-07-09 RX ORDER — CALCIUM CARBONATE 500 MG/1
1000 TABLET, CHEWABLE ORAL 4 TIMES DAILY PRN
Status: DISCONTINUED | OUTPATIENT
Start: 2025-07-09 | End: 2025-07-17 | Stop reason: HOSPADM

## 2025-07-09 RX ORDER — ALBUTEROL SULFATE 90 UG/1
2 INHALANT RESPIRATORY (INHALATION) EVERY 4 HOURS PRN
Status: DISCONTINUED | OUTPATIENT
Start: 2025-07-09 | End: 2025-07-17 | Stop reason: HOSPADM

## 2025-07-09 RX ADMIN — OXYCODONE HYDROCHLORIDE 5 MG: 5 TABLET ORAL at 23:09

## 2025-07-09 RX ADMIN — IOPAMIDOL 90 ML: 755 INJECTION, SOLUTION INTRAVENOUS at 15:54

## 2025-07-09 RX ADMIN — HYDROMORPHONE HYDROCHLORIDE 0.5 MG: 1 INJECTION, SOLUTION INTRAMUSCULAR; INTRAVENOUS; SUBCUTANEOUS at 15:17

## 2025-07-09 RX ADMIN — ONDANSETRON 4 MG: 2 INJECTION INTRAMUSCULAR; INTRAVENOUS at 20:36

## 2025-07-09 RX ADMIN — ONDANSETRON 4 MG: 2 INJECTION, SOLUTION INTRAMUSCULAR; INTRAVENOUS at 15:28

## 2025-07-09 RX ADMIN — SODIUM CHLORIDE 1000 ML: 0.9 INJECTION, SOLUTION INTRAVENOUS at 15:21

## 2025-07-09 RX ADMIN — TRANEXAMIC ACID 1 G: 10 INJECTION, SOLUTION INTRAVENOUS at 15:18

## 2025-07-09 ASSESSMENT — ACTIVITIES OF DAILY LIVING (ADL)
ADLS_ACUITY_SCORE: 41
WALKING_OR_CLIMBING_STAIRS_DIFFICULTY: YES
ADLS_ACUITY_SCORE: 56
ADLS_ACUITY_SCORE: 41
ADLS_ACUITY_SCORE: 41
DRESSING/BATHING_DIFFICULTY: NO
TOILETING_ISSUES: NO
WALKING_OR_CLIMBING_STAIRS: STAIR CLIMBING DIFFICULTY, REQUIRES EQUIPMENT
ADLS_ACUITY_SCORE: 56
ADLS_ACUITY_SCORE: 56
CHANGE_IN_FUNCTIONAL_STATUS_SINCE_ONSET_OF_CURRENT_ILLNESS/INJURY: YES
FALL_HISTORY_WITHIN_LAST_SIX_MONTHS: NO
ADLS_ACUITY_SCORE: 41
ADLS_ACUITY_SCORE: 41
DOING_ERRANDS_INDEPENDENTLY_DIFFICULTY: YES
ADLS_ACUITY_SCORE: 41

## 2025-07-09 NOTE — H&P
[facility]    History and Physical - Hospitalist Service       Date of Admission:  7/9/2025    Assessment & Plan      Monica Doshi is a 39 year old female with a past medical history of Endometrial Cancer, Asthma, GERD who was admitted on 7/9/25 after presenting to [facility] ED for Acute Anemia.    #Acute Blood Loss Anemia  #Endometrial Cancer  - Patient reports 8+ days of intermittent vaginal bleeding with associated weakness, shortness of breath, dizziness.  - In the ED - HGB 5.7,   - CTA A/P in the ED showing Fluid within the endometrial canal and proximal vaginal canal likely representing blood clots. No active bleeding identified.   Plan  - S/p 2 units RBCs in the ED  - Checking HGB ~11pm and with AM labs  - Gyn-Onc Consult placed - ED discussed with [facility] - Dr. Isbell will round tomorrow to discuss options.     #Type 2 Diabetes  - Last A1c was 6.5% in 05/2025  - Home medications include metformin, tirzepatide  Plan  - Hold home metformin, tirzepatide  - Sliding Scale Insulin with Glucose Checks    #Pulmonary Nodules  - Incidental finding on CT Abd in the ED  Plan  - Adding CT Chest without contrast on admission.    #Mood Disorder - Continue PTA Fluoxetine  #Asthma - Continue PTA Inhaler  #GERD - Continue PTA Famotidine/Omeprazole              Diet: Moderate Consistent Carb (60 g CHO per Meal) Diet  NPO for Procedure/Surgery per Anesthesia Guidelines Except for: Meds; Clear liquids before procedure/surgery: ADULT (Age GREATER than or Equal to 18 years) - Clear liquids 2 hours before procedure/surgery  DVT Prophylaxis: Pneumatic Compression Devices and holding chemoprophylaxis with active bleeding.  Navarro Catheter: Not present  Lines: None     Cardiac Monitoring: None  Code Status: Full Code    Clinically Significant Risk Factors Present on Admission         # Hyponatremia: Lowest Na = 134 mmol/L in last 2 days, will monitor as appropriate                  # Anemia: based on hgb <11  #  "Anemia: based on hgb <11       # Morbid Obesity: Estimated body mass index is 42.91 kg/m  as calculated from the following:    Height as of this encounter: 1.626 m (5' 4\").    Weight as of this encounter: 113.4 kg (250 lb).       # Asthma: noted on problem list        Disposition Plan     Medically Ready for Discharge: Anticipated in 5+ Days           Jono Julio MD  Hospitalist Service  [facility]  Securely message with [employer] (more info)  Text page via [employer] Paging/Directory     ______________________________________________________________________    Chief Complaint   Vaginal bleeding with anemia    History is obtained from the patient, electronic health record, and emergency department physician    History of Present Illness   Monica Doshi is a 39 year old female with a past medical history of Endometrial Cancer, Asthma, GERD who was admitted on 7/9/25 after presenting to [facility] ED for Acute Anemia.    Per chart review patient has a history of endometrial cancer and follows with [place] [facility] Dr. Isbell for Gyn-oncology.  Patient reports 8+ days of continuous vaginal bleeding.  Also with intermittent abdominal pain/cramping starting about 4 days ago.  Abdominal pain is in the lower abdomen and lasts for 3 to 4 hours at a time.  It is also associated with episodes of bleeding.  Patient has been feeling dizzy, weak, short of breath with bleeding.      Past Medical History    Past Medical History:   Diagnosis Date    Chlamydia 2013    Diabetes (H)     Endometrial hyperplasia, complex 05/05/2016 5/16: Endometrial echo 20 mm, thickened.  Pt is bleeding, the measured thickness appears to have vascular flow within, may wish to consider hysteroscopy and/or D&C for optimal evaluation.  5/14: Endometrium, biopsy --  Focal complex hyperplasia without atypia and rare squamous   Morules, No malignancy seen. Some studies report an associated increased risk of concurrent or   future development " of e    Gastroesophageal reflux disease     Hepatitis     Infertility counseling 2021    Trying from [id number] without pregnancy. Oligomenorrhea.    Major depressive disorder, recurrent, in full remission 2013    Marijuana dependence (H) 2009    Obese     Secondary oligomenorrhea 2021    PCOS?       Past Surgical History   Past Surgical History:   Procedure Laterality Date    DILATION AND CURETTAGE, OPERATIVE HYSTEROSCOPY, COMBINED N/A 2024    Procedure: HYSTEROSCOPY WITH BIOPSY OF ENDOMETRIUM AND POLYPECTOMY;  Surgeon: Magali Ramirez DO;  Location: [facility]    NO PAST SURGERIES         Prior to Admission Medications   Prior to Admission Medications   Prescriptions Last Dose Informant Patient Reported? Taking?   Continuous Glucose Sensor (FREESTYLE JESSICA 3 SENSOR) MISC More than a month  No Yes   Si each every 14 days. Use 1 sensor every 14 days. Use to read blood sugars per 's instructions.   FLUoxetine (PROZAC) 40 MG capsule   No No   Sig: Take 1 capsule (40 mg) by mouth daily.   Sharps Container MISC   No Yes   Si each as needed (for auto-injector disposal).   Tirzepatide 10 MG/0.5ML SOAJ More than a month  No Yes   Sig: Inject 0.5 mLs (10 mg) subcutaneously every 7 days.   acetaminophen (TYLENOL) 500 MG tablet   No Yes   Sig: Take 2 tablets (1,000 mg) by mouth every 6 hours as needed for mild pain.   albuterol (PROAIR HFA/PROVENTIL HFA/VENTOLIN HFA) 108 (90 Base) MCG/ACT inhaler   No Yes   Sig: Inhale 2 puffs into the lungs every 4 hours as needed   alcohol swab prep pads   No Yes   Sig: Use to swab area of injection/nishant as directed.   blood glucose (ACCU-CHEK GUIDE) test strip More than a month  No Yes   Sig: Use to test blood sugar ONCE daily.   blood glucose (NO BRAND SPECIFIED) lancets standard More than a month  No Yes   Sig: Use to test blood sugar 1 times daily or as directed.   cetirizine (ZYRTEC) 10 MG tablet 2025 Morning  No Yes    Sig: Take 1 tablet (10 mg) by mouth daily   famotidine (PEPCID) 20 MG tablet 2025 Evening  No Yes   Sig: Take 1 tablet (20 mg) by mouth 2 times daily.   ferrous gluconate (FERGON) 324 (38 Fe) MG tablet Unknown  No No   Sig: Take 1 tablet (324 mg) by mouth every other day   fluticasone (FLONASE) 50 MCG/ACT nasal spray 2025 Morning  No Yes   Sig: Spray 2 sprays into both nostrils daily   metFORMIN (GLUCOPHAGE XR) 500 MG 24 hr tablet 2025 Noon  No Yes   Sig: Take 1 tablet (500 mg) by mouth daily (with dinner).   omeprazole (PRILOSEC) 20 MG DR capsule 2025 Noon  No Yes   Sig: Take 1 capsule (20 mg) by mouth daily.   traZODone (DESYREL) 100 MG tablet 2025 Bedtime  No Yes   Sig: Take 1 tablet (100 mg) by mouth at bedtime.   vitamin C (ASCORBIC ACID) 500 MG tablet 2025 Morning  Yes Yes   Sig: Take 1,000 mg by mouth   vitamin D3 (CHOLECALCIFEROL) 50 mcg (2000 units) tablet 2025 Morning  No Yes   Sig: Take 2 tablets (100 mcg) by mouth daily.      Facility-Administered Medications: None        Social History   I have reviewed this patient's social history and updated it with pertinent information if needed.  Social History     Tobacco Use    Smoking status: Every Day     Current packs/day: 0.10     Average packs/day: 0.1 packs/day for 10.0 years (1.0 ttl pk-yrs)     Types: Cigarettes    Smokeless tobacco: Never   Vaping Use    Vaping status: Never Used   Substance Use Topics    Alcohol use: No    Drug use: Yes     Types: Marijuana     Comment: Drug use: h/o positive amphetamines on screen         Family History   I have reviewed this patient's family history and updated it with pertinent information if needed.  Family History   Problem Relation Age of Onset    ALS Mother 73    Hypertension Father     Hepatitis Father          of liver failure due to hep B 65y    ALS Maternal Uncle          Allergies   Allergies   Allergen Reactions    Zoloft      Sharp pains in arms        Physical Exam    Vital Signs: Temp: 98.2  F (36.8  C) Temp src: Oral BP: 134/83 Pulse: 75   Resp: 22 SpO2: 97 % O2 Device: Nasal cannula Oxygen Delivery: 1 LPM  Weight: 250 lbs 0 oz    General: Alert and Oriented x3. Answers questions appropriately. Follows commands.  Resp: Breath sounds equal throughout. No crackles. No wheezing.  Cardio: Regular rate and rhythm. No murmurs. No friction rub.  Abd: Soft. Non-distended. Non-tender. Bowel sounds normal. No rebound tenderness.  MSK: No areas of ecchymosis or erythema.  Neuro: CN 2-12 grossly intact. Strength 5/5 in all 4 extremities.  Skin:No rashes. No jaundice.       Medical Decision Making       75 MINUTES SPENT BY ME on the date of service doing chart review, history, exam, documentation & further activities per the note.  MANAGEMENT DISCUSSED with the following over the past 24 hours: RN, ED Physician   Tests ORDERED & REVIEWED in the past 24 hours:  - BMP  - CBC  - Coags/INR  - Lactic Acid  - Phosphorus  Medical complexity over the past 24 hours:  - Prescription DRUG MANAGEMENT performed      Data     I have personally reviewed the following data over the past 24 hrs:    12.9 (H)  \   5.7 (LL)   / 361     134 (L) 103 16.5 /  253 (H)   4.2 18 (L) 0.8 \     Procal: N/A CRP: N/A Lactic Acid: 1.2       INR:  1.04 PTT:  N/A   D-dimer:  N/A Fibrinogen:  N/A       Imaging results reviewed over the past 24 hrs:   Recent Results (from the past 24 hours)   CTA Abdomen Pelvis with Contrast    Narrative    EXAM: CTA ABDOMEN PELVIS WITH CONTRAST  LOCATION: [facility]  DATE: 7/9/2025    INDICATION: vaginal bleeding evaluate for active hemorrhage  COMPARISON: 5/30/2025.  TECHNIQUE: CT angiogram abdomen pelvis during arterial and venous phase of injection of IV contrast. 2D and 3D MIP reconstructions were performed by the CT technologist. Dose reduction techniques were used.  CONTRAST: 90ml isovue 370 from a single use vial    FINDINGS:  ANGIOGRAM ABDOMEN/PELVIS: The  abdominal aorta, visceral arteries, and visualized iliac arteries are widely patent. Visualized lower extremity arteries are widely patent and unremarkable.    LOWER CHEST: Mild cardiomegaly. 5 mm nodule in the anterior right lung base. Respiratory motion artifact. Scattered atelectasis. A 0.8 x 0.9 cm mass in the lingula. No pleural effusion or pneumothorax.    HEPATOBILIARY: Normal.    PANCREAS: Normal.    SPLEEN: Normal.    ADRENAL GLANDS: Normal.    KIDNEYS/BLADDER: Normal.    BOWEL: Normal.    LYMPH NODES: Normal.    PELVIC ORGANS: IUD in appropriate position. Fluid within the endometrial canal and within the proximal vagina, likely representing blood clots. No active extravasation.    MUSCULOSKELETAL: Normal.      Impression    IMPRESSION:  1.  Fluid within the endometrial canal and proximal vaginal canal likely representing blood clots. No active bleeding identified.  2.  2 visualized pulmonary nodules, largest measuring 0.8 x 0.9 cm within the lingula. Recommend CT of the chest without contrast for further evaluation.

## 2025-07-09 NOTE — ED PROVIDER NOTES
EMERGENCY DEPARTMENT ENCOUNTER      NAME: Monica Doshi  AGE: 39 year old female  YOB: 1985  MRN: [id number]  EVALUATION DATE & TIME: 7/9/2025  2:55 PM    PCP: Abril Martin    ED PROVIDER: Matthew Reyes MD    FINAL IMPRESSION:  1. Anemia due to blood loss, acute    2. Vaginal bleeding    3. Endometrial cancer (H)    4. Pulmonary nodule        ED COURSE & MEDICAL DECISION MAKING:    Pertinent Labs & Imaging studies reviewed. (See chart for details)  39 year old female presents to the Emergency Department for evaluation of vaginal bleeding.  Differential diagnosis considered Ectopic pregnancy, miscarriage, dysfunctional uterine bleeding, fibroids, vaginal laceration, anemia, coagulopathy.   3:02 PM I met with the patient, obtained history, performed an initial exam, and discussed options and plan for diagnostics and treatment here in the ED.    Triage Note: Pt brought in by EMS from private home for vaginal bleeding for a week.  Pt saturating multiple pads in an hour.  Pt has history endometrial cancer.  .  Pt pale, diaphoretic with rapid breathing.    ED Course as of 07/09/25 1725   Wed Jul 09, 2025   1508 Patient has known endometrial cancer and is under the care of [place] [facility]  Mary Ellen Isbell MD has previously scheduled a hysterectomy however has not completed.  Has had 8 days of vaginal bleeding.  Arrives diaphoretic pale tachycardic however no active bleeding.  She looks clinically unwell.  Having lower abdominal pain.  Ordered 2 IVs placed in the cardiac monitor will give IV fluids TXA Dilaudid for pain.  Laboratory work including type and screen is ordered.  CTA of the abdomen pelvis also ordered.  Will contact the [place] [facility] to expedite care.  I did perform a bedside external exam which did not show active hemorrhage and patient is in brief that it is small amounts of bleeding.   1545 Spoke to medicine oncology Dasia.  Wait for further results and call back.   1603 CBC  with platelets differential(!!)  Critically low hemoglobin.  Consent was signed.  Type and screen has been ordered.  Will transfuse patient 2 units   1603 SpO2(!): 90 %  Had received Dilaudid likely caused  mild hypoxia placed on 2 L.  Patient much more comfortable.   1630 CTA Abdomen Pelvis with Contrast  IMPRESSION:  1.  Fluid within the endometrial canal and proximal vaginal canal likely representing blood clots. No active bleeding identified.  2.  2 visualized pulmonary nodules, largest measuring 0.8 x 0.9 cm within the lingula. Recommend CT of the chest without contrast for further evaluation.      Pulmonary nodules which will need to be further evaluated during hospitalization   1631 Dasia SUAZO with [facility]. recommends admission and add Dr. Isbell will round tomorrow to discuss options.   1632 Patient be admitted to the hospitalist service.       Not Applicable    I discussed ED course, work up and treatments with the hospitalist who agrees with admission.     At the conclusion of the encounter I discussed the results of the tests and the disposition. The questions were answered. The patient or family acknowledged understanding and was agreeable with the care plan.     MEDICATIONS GIVEN IN THE EMERGENCY:  Medications   ondansetron (ZOFRAN) injection 4 mg (4 mg Intravenous $Given 7/9/25 1528)   tranexamic acid 1 g in 100 mL NS IV bag (premix) (1 g Intravenous $Given 7/9/25 1518)   sodium chloride 0.9% BOLUS 1,000 mL (1,000 mLs Intravenous $New Bag 7/9/25 1521)   HYDROmorphone (PF) (DILAUDID) injection 0.5 mg (0.5 mg Intravenous $Given 7/9/25 1517)   iopamidol (ISOVUE-370) solution 90 mL (90 mLs Intravenous $Given 7/9/25 1554)       NEW PRESCRIPTIONS STARTED AT TODAY'S ER VISIT  New Prescriptions    No medications on file     Modified Medications    No medications on file       =================================================================    HPI    Monica Doshi is a 39 year old female with a pertinent history  "of menorrhagia, CKD stage 1, GERD, endometrial adenocarcinoma, tobacco use, and T2DM who presents to this ED for evaluation of vaginal bleeding.         Patient has known endometrial cancer and is under the care of [place] [facility]  Mary Ellen Isbell MD has previously scheduled a hysterectomy however has not completed.  Has had 8 days of vaginal bleeding.  Arrives diaphoretic pale tachycardic however no active bleeding.  She looks clinically unwell.  Having lower abdominal pain.       PHYSICAL EXAM    BP (!) 144/69   Pulse 88   Temp 98.6  F (37  C)   Resp 23   Ht 1.626 m (5' 4\")   Wt 113.4 kg (250 lb)   SpO2 100%   BMI 42.91 kg/m    Constitutional: uncomfortable appearing, pale  Head: Normocephalic, atraumatic, mucous membranes moist, nose normal.   Neck: Supple, gross ROM intact.   Eyes: Pupils mid-range, sclera white.  Respiratory: Clear to auscultation bilaterally, no respiratory distress, no wheezing, speaks full sentences easily.  Cardiovascular: tachycardic heart rate, regular rhythm, no murmurs. No lower extremity edema.   GI: Soft, no tenderness to deep palpation in all quadrants.  Musculoskeletal: Moving all 4 extremities intentionally and without pain. No obvious deformity.  Skin: Warm, dry, no rash.  : Lizzie at bedside for external vaginal exam.  Brief that is just a small amount of spotting.  No clots.  No active vaginal bleeding on exam.  Neurologic: Alert & oriented x 3, speech clear, moving all extremities spontaneously   Psychiatric: Affect normal, cooperative.       LAB:  All pertinent labs reviewed and interpreted.  Results for orders placed or performed during the hospital encounter of 07/09/25   CTA Abdomen Pelvis with Contrast    Impression    IMPRESSION:  1.  Fluid within the endometrial canal and proximal vaginal canal likely representing blood clots. No active bleeding identified.  2.  2 visualized pulmonary nodules, largest measuring 0.8 x 0.9 cm within the lingula. " Recommend CT of the chest without contrast for further evaluation.   Basic metabolic panel   Result Value Ref Range    Sodium 134 (L) 135 - 145 mmol/L    Potassium 4.3 3.4 - 5.3 mmol/L    Chloride 103 98 - 107 mmol/L    Carbon Dioxide (CO2) 18 (L) 22 - 29 mmol/L    Anion Gap 13 7 - 15 mmol/L    Urea Nitrogen 16.5 6.0 - 20.0 mg/dL    Creatinine 0.80 0.51 - 0.95 mg/dL    GFR Estimate >90 >60 mL/min/1.73m2    Calcium 9.6 8.8 - 10.4 mg/dL    Glucose 253 (H) 70 - 99 mg/dL   CBC with platelets and differential   Result Value Ref Range    WBC Count 12.9 (H) 4.0 - 11.0 10e3/uL    RBC Count 2.78 (L) 3.80 - 5.20 10e6/uL    Hemoglobin 5.7 (LL) 11.7 - 15.7 g/dL    Hematocrit 19.4 (L) 35.0 - 47.0 %    MCV 70 (L) 78 - 100 fL    MCH 20.5 (L) 26.5 - 33.0 pg    MCHC 29.4 (L) 31.5 - 36.5 g/dL    RDW 18.4 (H) 10.0 - 15.0 %    Platelet Count 361 150 - 450 10e3/uL    % Neutrophils 76 %    % Lymphocytes 18 %    % Monocytes 4 %    % Eosinophils 0 %    % Basophils 0 %    % Immature Granulocytes 1 %    NRBCs per 100 WBC 0 <1 /100    Absolute Neutrophils 9.8 (H) 1.6 - 8.3 10e3/uL    Absolute Lymphocytes 2.4 0.8 - 5.3 10e3/uL    Absolute Monocytes 0.5 0.0 - 1.3 10e3/uL    Absolute Eosinophils 0.1 0.0 - 0.7 10e3/uL    Absolute Basophils 0.0 0.0 - 0.2 10e3/uL    Absolute Immature Granulocytes 0.1 <=0.4 10e3/uL    Absolute NRBCs 0.1 10e3/uL   Extra Blue Top Tube   Result Value Ref Range    Hold Specimen JIC    Extra Red Top Tube   Result Value Ref Range    Hold Specimen JIC    Extra Green Top (Lithium Heparin) Tube   Result Value Ref Range    Hold Specimen JIC    INR   Result Value Ref Range    INR 1.04 0.85 - 1.15    PT 13.9 11.8 - 14.8 Seconds   Creatinine POCT   Result Value Ref Range    Creatinine POCT 0.8 0.5 - 1.0 mg/dL    GFR, ESTIMATED POCT >60 >60 mL/min/1.73m2   Adult Type and Screen   Result Value Ref Range    ABO/RH(D) A POS     Antibody Screen Negative Negative    SPECIMEN EXPIRATION DATE 7/12/2025 11:59:00 PM CDT    Prepare red  blood cells (unit)   Result Value Ref Range    Blood Component Type Red Blood Cells     Product Code [id number]     Unit Status Transfused     Unit Number [id number]     CROSSMATCH Compatible     CODING SYSTEM [id number]     ISSUE DATE AND TIME 7/9/2025  4:23:00 PM CDT     UNIT ABO/RH A+     UNIT TYPE ISBT [id number]    Prepare red blood cells (unit)   Result Value Ref Range    Blood Component Type Red Blood Cells     Product Code [id number]     Unit Status Ready for issue     Unit Number [id number]     CROSSMATCH Compatible     CODING SYSTEM [id number]        RADIOLOGY:  Reviewed all pertinent imaging. Please see official radiology report.  CTA Abdomen Pelvis with Contrast   Final Result   IMPRESSION:   1.  Fluid within the endometrial canal and proximal vaginal canal likely representing blood clots. No active bleeding identified.   2.  2 visualized pulmonary nodules, largest measuring 0.8 x 0.9 cm within the lingula. Recommend CT of the chest without contrast for further evaluation.            PROCEDURES:   Critical Care  Performed by: Matthew Reyes MD  Authorized by: Matthew Reyes MD    Total critical care time: 90 minutes  Critical care time was exclusive of separately billable procedures and treating other patients.  Critical care was necessary to treat or prevent imminent or life-threatening deterioration of the following conditions: Anemia hgb <8 and packed RBC   Critical care was time spent personally by me on the following activities: development of treatment plan with patient or surrogate, discussions with consultants, examination of patient, evaluation of patient's response to treatment, obtaining history from patient or surrogate, ordering and performing treatments and interventions, ordering and review of laboratory studies, ordering and review of radiographic studies and re-evaluation of patient's condition, this excludes any separately billable procedures.        Matthew Reyes MD  [facility]  [facility] EMERGENCY DEPARTMENT  [address] [place] [id number]  [phone]   =================================================================    BILLING:  Data  Category 1  Non-ED record review, if applicable. External record reviewed: N/A     Clinical information was obtained from an independent historian. History was obtained from: Patient     The following testing was considered but ultimately not selected after discussion with patient/family: N/A     Category 2  My independent interpretation of EKG, rhythm strip, radiology study: CT abdomen and pelvis did not reveal large AAA     Category 3  Discussion of management with other physician/healthcare provider/other source: Spoke to hospitalist regarding patient's ED course and agrees with admission to the hospital  and Spoke to OBGYN regarding patient's ED course and agrees to consult       Risk  Prescription medication was considered, but ultimately not given after discussion with patient/family: N/A     Chronic conditions affecting care: Diabetes and Tobacco use     Consideration of Admission/Observation: Admitted to hospital            I, Domingo Galloway, am serving as a scribe to document services personally performed by Matthew Reyes MD based on my observation and the provider's statements to me. I, Matthew Reyes MD, attest that Domingo Galloway is acting in a scribe capacity, has observed my performance of the services and has documented them in accordance with my direction.     Matthew Reyes MD  07/09/25 1629

## 2025-07-09 NOTE — MEDICATION SCRIBE - ADMISSION MEDICATION HISTORY
Medication Scribe Admission Medication History    Admission medication history is complete. The information provided in this note is only as accurate as the sources available at the time of the update.    Information Source(s): Patient via in-person    Pertinent Information: Patient able to confirm med list, allergies and last dosing. Patient states she has not been testing her blood sugars (CGM or manual) or injecting mounjaro due to insurance complications.     Changes made to PTA medication list:  Added: None  Deleted: miralax  Changed: None    Allergies reviewed with patient and updates made in EHR: yes    Medication History Completed By: Mykel Hernandez 7/9/2025 5:24 PM    PTA Med List   Medication Sig Last Dose/Taking    acetaminophen (TYLENOL) 500 MG tablet Take 2 tablets (1,000 mg) by mouth every 6 hours as needed for mild pain. Taking As Needed    albuterol (PROAIR HFA/PROVENTIL HFA/VENTOLIN HFA) 108 (90 Base) MCG/ACT inhaler Inhale 2 puffs into the lungs every 4 hours as needed Taking As Needed    alcohol swab prep pads Use to swab area of injection/nishant as directed. Taking    blood glucose (ACCU-CHEK GUIDE) test strip Use to test blood sugar ONCE daily. More than a month    blood glucose (NO BRAND SPECIFIED) lancets standard Use to test blood sugar 1 times daily or as directed. More than a month    cetirizine (ZYRTEC) 10 MG tablet Take 1 tablet (10 mg) by mouth daily 7/8/2025 Morning    Continuous Glucose Sensor (FREESTYLE JESSICA 3 SENSOR) MISC 1 each every 14 days. Use 1 sensor every 14 days. Use to read blood sugars per 's instructions. More than a month    famotidine (PEPCID) 20 MG tablet Take 1 tablet (20 mg) by mouth 2 times daily. 7/8/2025 Evening    fluticasone (FLONASE) 50 MCG/ACT nasal spray Spray 2 sprays into both nostrils daily 7/8/2025 Morning    metFORMIN (GLUCOPHAGE XR) 500 MG 24 hr tablet Take 1 tablet (500 mg) by mouth daily (with dinner). 7/8/2025 Noon    omeprazole  (PRILOSEC) 20 MG DR capsule Take 1 capsule (20 mg) by mouth daily. 7/8/2025 Noon    Sharps Container MISC 1 each as needed (for auto-injector disposal). Taking As Needed    Tirzepatide 10 MG/0.5ML SOAJ Inject 0.5 mLs (10 mg) subcutaneously every 7 days. More than a month    traZODone (DESYREL) 100 MG tablet Take 1 tablet (100 mg) by mouth at bedtime. 7/8/2025 Bedtime    vitamin C (ASCORBIC ACID) 500 MG tablet Take 1,000 mg by mouth 7/8/2025 Morning    vitamin D3 (CHOLECALCIFEROL) 50 mcg (2000 units) tablet Take 2 tablets (100 mcg) by mouth daily. 7/8/2025 Morning

## 2025-07-10 ENCOUNTER — APPOINTMENT (OUTPATIENT)
Dept: CT IMAGING | Facility: HOSPITAL | Age: 40
DRG: 740 | End: 2025-07-10
Attending: STUDENT IN AN ORGANIZED HEALTH CARE EDUCATION/TRAINING PROGRAM
Payer: COMMERCIAL

## 2025-07-10 VITALS
BODY MASS INDEX: 42.68 KG/M2 | WEIGHT: 250 LBS | RESPIRATION RATE: 20 BRPM | OXYGEN SATURATION: 96 % | SYSTOLIC BLOOD PRESSURE: 132 MMHG | TEMPERATURE: 97.8 F | DIASTOLIC BLOOD PRESSURE: 72 MMHG | HEIGHT: 64 IN | HEART RATE: 79 BPM

## 2025-07-10 LAB
ALBUMIN SERPL BCG-MCNC: 3.7 G/DL (ref 3.5–5.2)
ALP SERPL-CCNC: 46 U/L (ref 40–150)
ALT SERPL W P-5'-P-CCNC: 10 U/L (ref 0–50)
ANION GAP SERPL CALCULATED.3IONS-SCNC: 6 MMOL/L (ref 7–15)
AST SERPL W P-5'-P-CCNC: 10 U/L (ref 0–45)
BILIRUB SERPL-MCNC: 0.7 MG/DL
BLD PROD TYP BPU: NORMAL
BLOOD COMPONENT TYPE: NORMAL
BUN SERPL-MCNC: 11.3 MG/DL (ref 6–20)
CALCIUM SERPL-MCNC: 9.3 MG/DL (ref 8.8–10.4)
CHLORIDE SERPL-SCNC: 105 MMOL/L (ref 98–107)
CODING SYSTEM: NORMAL
CREAT SERPL-MCNC: 0.6 MG/DL (ref 0.51–0.95)
CROSSMATCH: NORMAL
EGFRCR SERPLBLD CKD-EPI 2021: >90 ML/MIN/1.73M2
ERYTHROCYTE [DISTWIDTH] IN BLOOD BY AUTOMATED COUNT: 18.4 % (ref 10–15)
GLUCOSE BLDC GLUCOMTR-MCNC: 153 MG/DL (ref 70–99)
GLUCOSE BLDC GLUCOMTR-MCNC: 168 MG/DL (ref 70–99)
GLUCOSE BLDC GLUCOMTR-MCNC: 227 MG/DL (ref 70–99)
GLUCOSE BLDC GLUCOMTR-MCNC: 242 MG/DL (ref 70–99)
GLUCOSE BLDC GLUCOMTR-MCNC: 254 MG/DL (ref 70–99)
GLUCOSE SERPL-MCNC: 232 MG/DL (ref 70–99)
HAV IGM SERPL QL IA: NONREACTIVE
HBV CORE IGM SERPL QL IA: NONREACTIVE
HBV SURFACE AG SERPL QL IA: REACTIVE
HCO3 SERPL-SCNC: 25 MMOL/L (ref 22–29)
HCT VFR BLD AUTO: 24.3 % (ref 35–47)
HCV AB SERPL QL IA: NONREACTIVE
HGB BLD-MCNC: 7.5 G/DL (ref 11.7–15.7)
HOLD SPECIMEN: NORMAL
ISSUE DATE AND TIME: NORMAL
MAGNESIUM SERPL-MCNC: 2.4 MG/DL (ref 1.7–2.3)
MCH RBC QN AUTO: 23 PG (ref 26.5–33)
MCHC RBC AUTO-ENTMCNC: 30.9 G/DL (ref 31.5–36.5)
MCV RBC AUTO: 75 FL (ref 78–100)
PHOSPHATE SERPL-MCNC: 3.3 MG/DL (ref 2.5–4.5)
PLATELET # BLD AUTO: 293 10E3/UL (ref 150–450)
POTASSIUM SERPL-SCNC: 4.3 MMOL/L (ref 3.4–5.3)
PROT SERPL-MCNC: 6.3 G/DL (ref 6.4–8.3)
RBC # BLD AUTO: 3.26 10E6/UL (ref 3.8–5.2)
SODIUM SERPL-SCNC: 136 MMOL/L (ref 135–145)
UNIT ABO/RH: NORMAL
UNIT NUMBER: NORMAL
UNIT STATUS: NORMAL
UNIT TYPE ISBT: 6200
WBC # BLD AUTO: 10 10E3/UL (ref 4–11)

## 2025-07-10 PROCEDURE — 84100 ASSAY OF PHOSPHORUS: CPT | Performed by: STUDENT IN AN ORGANIZED HEALTH CARE EDUCATION/TRAINING PROGRAM

## 2025-07-10 PROCEDURE — 250N000013 HC RX MED GY IP 250 OP 250 PS 637: Performed by: STUDENT IN AN ORGANIZED HEALTH CARE EDUCATION/TRAINING PROGRAM

## 2025-07-10 PROCEDURE — 120N000001 HC R&B MED SURG/OB

## 2025-07-10 PROCEDURE — 85027 COMPLETE CBC AUTOMATED: CPT | Performed by: STUDENT IN AN ORGANIZED HEALTH CARE EDUCATION/TRAINING PROGRAM

## 2025-07-10 PROCEDURE — 80053 COMPREHEN METABOLIC PANEL: CPT | Performed by: STUDENT IN AN ORGANIZED HEALTH CARE EDUCATION/TRAINING PROGRAM

## 2025-07-10 PROCEDURE — 99222 1ST HOSP IP/OBS MODERATE 55: CPT | Performed by: INTERNAL MEDICINE

## 2025-07-10 PROCEDURE — P9016 RBC LEUKOCYTES REDUCED: HCPCS | Performed by: INTERNAL MEDICINE

## 2025-07-10 PROCEDURE — 99233 SBSQ HOSP IP/OBS HIGH 50: CPT | Performed by: INTERNAL MEDICINE

## 2025-07-10 PROCEDURE — 250N000012 HC RX MED GY IP 250 OP 636 PS 637: Performed by: STUDENT IN AN ORGANIZED HEALTH CARE EDUCATION/TRAINING PROGRAM

## 2025-07-10 PROCEDURE — 71250 CT THORAX DX C-: CPT

## 2025-07-10 PROCEDURE — 36415 COLL VENOUS BLD VENIPUNCTURE: CPT | Performed by: STUDENT IN AN ORGANIZED HEALTH CARE EDUCATION/TRAINING PROGRAM

## 2025-07-10 PROCEDURE — 250N000011 HC RX IP 250 OP 636: Performed by: STUDENT IN AN ORGANIZED HEALTH CARE EDUCATION/TRAINING PROGRAM

## 2025-07-10 PROCEDURE — 83735 ASSAY OF MAGNESIUM: CPT | Performed by: STUDENT IN AN ORGANIZED HEALTH CARE EDUCATION/TRAINING PROGRAM

## 2025-07-10 RX ORDER — PANTOPRAZOLE SODIUM 40 MG/1
40 TABLET, DELAYED RELEASE ORAL
Status: DISCONTINUED | OUTPATIENT
Start: 2025-07-10 | End: 2025-07-17 | Stop reason: HOSPADM

## 2025-07-10 RX ADMIN — FAMOTIDINE 20 MG: 20 TABLET ORAL at 20:50

## 2025-07-10 RX ADMIN — INSULIN ASPART 1 UNITS: 100 INJECTION, SOLUTION INTRAVENOUS; SUBCUTANEOUS at 17:10

## 2025-07-10 RX ADMIN — ONDANSETRON 4 MG: 2 INJECTION INTRAMUSCULAR; INTRAVENOUS at 02:02

## 2025-07-10 RX ADMIN — INSULIN ASPART 1 UNITS: 100 INJECTION, SOLUTION INTRAVENOUS; SUBCUTANEOUS at 09:14

## 2025-07-10 RX ADMIN — FAMOTIDINE 20 MG: 20 TABLET ORAL at 10:44

## 2025-07-10 RX ADMIN — CETIRIZINE HYDROCHLORIDE 10 MG: 10 TABLET, FILM COATED ORAL at 09:16

## 2025-07-10 RX ADMIN — Medication 1000 MG: at 09:15

## 2025-07-10 RX ADMIN — ONDANSETRON 4 MG: 2 INJECTION INTRAMUSCULAR; INTRAVENOUS at 08:08

## 2025-07-10 RX ADMIN — FLUTICASONE PROPIONATE 2 SPRAY: 50 SPRAY, METERED NASAL at 10:43

## 2025-07-10 RX ADMIN — INSULIN ASPART 2 UNITS: 100 INJECTION, SOLUTION INTRAVENOUS; SUBCUTANEOUS at 13:02

## 2025-07-10 RX ADMIN — PANTOPRAZOLE SODIUM 40 MG: 40 TABLET, DELAYED RELEASE ORAL at 09:16

## 2025-07-10 RX ADMIN — OXYCODONE HYDROCHLORIDE 5 MG: 5 TABLET ORAL at 09:16

## 2025-07-10 RX ADMIN — TRAZODONE HYDROCHLORIDE 100 MG: 50 TABLET ORAL at 20:50

## 2025-07-10 RX ADMIN — FERROUS GLUCONATE 324 MG: 324 TABLET ORAL at 09:16

## 2025-07-10 RX ADMIN — Medication 100 MCG: at 09:16

## 2025-07-10 RX ADMIN — FLUOXETINE HYDROCHLORIDE 40 MG: 20 CAPSULE ORAL at 10:43

## 2025-07-10 ASSESSMENT — ACTIVITIES OF DAILY LIVING (ADL)
ADLS_ACUITY_SCORE: 42
ADLS_ACUITY_SCORE: 39
ADLS_ACUITY_SCORE: 42
ADLS_ACUITY_SCORE: 41
ADLS_ACUITY_SCORE: 41
ADLS_ACUITY_SCORE: 45
ADLS_ACUITY_SCORE: 42
ADLS_ACUITY_SCORE: 42
ADLS_ACUITY_SCORE: 39
ADLS_ACUITY_SCORE: 41
ADLS_ACUITY_SCORE: 42
ADLS_ACUITY_SCORE: 45
ADLS_ACUITY_SCORE: 42
ADLS_ACUITY_SCORE: 42
ADLS_ACUITY_SCORE: 45
ADLS_ACUITY_SCORE: 45
ADLS_ACUITY_SCORE: 39
ADLS_ACUITY_SCORE: 39
ADLS_ACUITY_SCORE: 42
ADLS_ACUITY_SCORE: 45
DEPENDENT_IADLS:: INDEPENDENT
ADLS_ACUITY_SCORE: 39

## 2025-07-10 NOTE — CONSULTS
"PULMONARY/CRITICAL CARE CONSULT NOTE    Date / Time of Admission:  7/9/2025  2:55 PM  Assessment:   39yoF with known endometrial cancer based on biopsy from 6/2024 now with several lung nodules 1cm and smaller concerning for metastasis.     Clinically Significant Risk Factors Present on Admission         # Hyponatremia: Lowest Na = 134 mmol/L in last 2 days, will monitor as appropriate                # Anemia: based on hgb <11  # Anemia: based on hgb <11       # Morbid Obesity: Estimated body mass index is 42.91 kg/m  as calculated from the following:    Height as of this encounter: 1.626 m (5' 4\").    Weight as of this encounter: 113.4 kg (250 lb).       # Asthma: noted on problem list              Active Problems:    Vaginal bleeding    Anemia due to blood loss, acute        Plan:   Discussed with reading radiologist feasibility of obtaining a biopsy. I am concerned about waiting for PET scan as outpatient due to her difficulty with follow up.   Had an indepth discussion with patient. Reviewed imaging with her. Explained importance of knowing if this is cancer. Given her age, this would more likely be metastatic from her endometrial cancer than primary lung cancer.   Discussed plan with [facility].       Subjective:    cc:    HPI: 39 year old female with history of obesity, daily cigarette/marijuana smoking and known endometrial cancer by biopsy 6/2024 here with severe vaginal hemorrhage from the endometrial cancer.    CTA performed due to bleeding but caught the bases of her lungs. CT chest then performed that found 1cm and smaller nodules in the lungs.     She does report smoking cigarettes and marijuana and vaping daily.     Past Medical History:   Diagnosis Date    Chlamydia 2013    Diabetes (H)     Endometrial hyperplasia, complex 05/05/2016 5/16: Endometrial echo 20 mm, thickened.  Pt is bleeding, the measured thickness appears to have vascular flow within, may wish to consider hysteroscopy and/or " D&C for optimal evaluation.  : Endometrium, biopsy --  Focal complex hyperplasia without atypia and rare squamous   Morules, No malignancy seen. Some studies report an associated increased risk of concurrent or   future development of e    Gastroesophageal reflux disease     Hepatitis     Infertility counseling 2021    Trying from 1768-0201 without pregnancy. Oligomenorrhea.    Major depressive disorder, recurrent, in full remission 2013    Marijuana dependence (H) 2009    Obese     Secondary oligomenorrhea 2021    PCOS?       Social History     Tobacco Use    Smoking status: Every Day     Current packs/day: 0.10     Average packs/day: 0.1 packs/day for 10.0 years (1.0 ttl pk-yrs)     Types: Cigarettes    Smokeless tobacco: Never   Substance Use Topics    Alcohol use: No       Family History   Problem Relation Age of Onset    ALS Mother 73    Hypertension Father     Hepatitis Father          of liver failure due to hep B 65y    ALS Maternal Uncle        Current Facility-Administered Medications   Medication Dose Route Frequency Provider Last Rate Last Admin    acetaminophen (TYLENOL) tablet 650 mg  650 mg Oral Q4H PRN Jono Julio MD        Or    acetaminophen (TYLENOL) Suppository 650 mg  650 mg Rectal Q4H PRN Jono Julio MD        albuterol (PROVENTIL HFA/VENTOLIN HFA) inhaler  2 puff Inhalation Q4H PRN Jono Julio MD        calcium carbonate (TUMS) chewable tablet 1,000 mg  1,000 mg Oral 4x Daily PRN Jono Julio MD        cetirizine (zyrTEC) tablet 10 mg  10 mg Oral Daily Jono Julio MD   10 mg at 07/10/25 0916    glucose gel 15-30 g  15-30 g Oral Q15 Min PRN Jono Julio MD        Or    dextrose 50 % injection 25-50 mL  25-50 mL Intravenous Q15 Min PRN Jono Julio MD        Or    glucagon injection 1 mg  1 mg Subcutaneous Q15 Min PRN Jono Julio MD        famotidine (PEPCID) tablet 20 mg  20 mg Oral BID Jono Julio MD   20 mg at  07/10/25 1044    ferrous gluconate (FERGON) tablet 324 mg  324 mg Oral Every Other Day Jono Julio MD   324 mg at 07/10/25 0916    FLUoxetine (PROzac) capsule 40 mg  40 mg Oral Daily Jono Julio MD   40 mg at 07/10/25 1043    fluticasone (FLONASE) 50 MCG/ACT spray 2 spray  2 spray Both Nostrils Daily Jono Julio MD   2 spray at 07/10/25 1043    insulin aspart (NovoLOG) injection (RAPID ACTING)  1-7 Units Subcutaneous TID AC Jono Julio MD   1 Units at 07/10/25 0914    insulin aspart (NovoLOG) injection (RAPID ACTING)  1-5 Units Subcutaneous At Bedtime Jono Julio MD   1 Units at 07/10/25 0200    lidocaine (LMX4) cream   Topical Q1H PRN Jono Julio MD        lidocaine 1 % 0.1-1 mL  0.1-1 mL Other Q1H PRN Jono Julio MD        melatonin tablet 5 mg  5 mg Oral At Bedtime PRN Jono Julio MD        naloxone (NARCAN) injection 0.2 mg  0.2 mg Intravenous Q2 Min PRN Jono Julio MD        Or    naloxone (NARCAN) injection 0.4 mg  0.4 mg Intravenous Q2 Min PRN Jono Julio MD        Or    naloxone (NARCAN) injection 0.2 mg  0.2 mg Intramuscular Q2 Min PRN Jono Julio MD        Or    naloxone (NARCAN) injection 0.4 mg  0.4 mg Intramuscular Q2 Min PRN Jono Julio MD        ondansetron (ZOFRAN ODT) ODT tab 4 mg  4 mg Oral Q6H PRN Jono Julio MD        Or    ondansetron (ZOFRAN) injection 4 mg  4 mg Intravenous Q6H PRN Jnoo Julio MD   4 mg at 07/10/25 0808    oxyCODONE (ROXICODONE) tablet 5 mg  5 mg Oral Q4H PRN Jono Julio MD   5 mg at 07/10/25 0916    Or    oxyCODONE (ROXICODONE) tablet 10 mg  10 mg Oral Q4H PRN Jono Julio MD        pantoprazole (PROTONIX) EC tablet 40 mg  40 mg Oral QAM AC Jono Julio MD   40 mg at 07/10/25 0916    polyethylene glycol (MIRALAX) Packet 17 g  17 g Oral BID PRN Jono Julio MD        senna-docusate (SENOKOT-S/PERICOLACE) 8.6-50 MG per tablet 1 tablet  1 tablet Oral BID PRN Jono Julio  "MD        Or    senna-docusate (SENOKOT-S/PERICOLACE) 8.6-50 MG per tablet 2 tablet  2 tablet Oral BID PRN Jono Julio MD        sodium chloride (PF) 0.9% PF flush 3 mL  3 mL Intracatheter Q8H Jono Julio MD   3 mL at 07/10/25 0556    sodium chloride (PF) 0.9% PF flush 3 mL  3 mL Intracatheter q1 min prn Jono Julio MD        traZODone (DESYREL) tablet 100 mg  100 mg Oral At Bedtime Jono Julio MD        vitamin C (ASCORBIC ACID) tablet 1,000 mg  1,000 mg Oral Daily Jono Julio MD   1,000 mg at 07/10/25 0915    Vitamin D3 (CHOLECALCIFEROL) tablet 100 mcg  100 mcg Oral Daily Jono Julio MD   100 mcg at 07/10/25 0916         Review of Systems: 12-point Review performed and negative aside from that noted in HPI.    Objective:    Vital signs:  BP (!) 140/91   Pulse 75   Temp 98.1  F (36.7  C) (Oral)   Resp 18   Ht 1.626 m (5' 4\")   Wt 113.4 kg (250 lb)   SpO2 100%   BMI 42.91 kg/m      GENERAL APPEARANCE: healthy, alert and no distress     EYES: EOMI, - PERRL     NECK: no adenopathy, no asymmetry, masses, or scars and thyroid normal to palpation     RESP: lungs clear to auscultation - no rales, rhonchi or wheezes     CV: regular rates and rhythm, normal S1 S2, no S3 or S4 and no murmur, click or rub -     ABDOMEN:  soft, nontender, no HSM or masses and bowel sounds normal     MS: extremities normal- no gross deformities noted, no evidence of inflammation in joints, FROM in all extremities.     SKIN: no suspicious lesions or rashes     NEURO: Normal strength and tone, sensory exam grossly normal, mentation intact and speech normal     PSYCH: mentation appears normal. and affect normal/bright     LYMPHATICS: No axillary, cervical, inguinal, or supraclavicular nodes        Data  CT chest personally reviewed  EXAM: CT CHEST W/O CONTRAST  LOCATION: [facility]  DATE: 7/10/2025     INDICATION: Pulmonary Nodules  COMPARISON: CT abdomen/pelvis from " 7/9/2025.  TECHNIQUE: CT chest without IV contrast. Multiplanar reformats were obtained. Dose reduction techniques were used.  CONTRAST: None.     FINDINGS:   LUNGS AND PLEURA: Bandlike atelectasis in the right lower lobe. Again demonstrated are 2 adjacent nodules in the inferior segment of the lingula, one of which measures 9 mm on image 186 of series 4 and the second of which measures 7 mm on image 194.   There is also right-sided nodules including a 10 mm nodule in the anterior, medial aspect of the right middle lobe on image 131 of series a 5 mm nodule in the inferior, lateral segment of the right middle lobe on image 151. No pneumothorax or pleural   effusion.     MEDIASTINUM/AXILLAE: Cardiac size at upper limits normal. No pericardial effusion.     CORONARY ARTERY CALCIFICATION: None.     UPPER ABDOMEN: No acute findings.     MUSCULOSKELETAL: Normal.                                                                      IMPRESSION:   1.  Bilateral pulmonary nodules measuring up to 10 mm, of indeterminate etiology or significance in absence of comparison thoracic imaging. Recommend pulmonology consultation for further management and follow-up.        Laboratory:  Results for orders placed or performed during the hospital encounter of 07/09/25   Basic metabolic panel    Collection Time: 07/09/25  3:04 PM   Result Value Ref Range    Sodium 134 (L) 135 - 145 mmol/L    Potassium 4.3 3.4 - 5.3 mmol/L    Chloride 103 98 - 107 mmol/L    Carbon Dioxide (CO2) 18 (L) 22 - 29 mmol/L    Anion Gap 13 7 - 15 mmol/L    Urea Nitrogen 16.5 6.0 - 20.0 mg/dL    Creatinine 0.80 0.51 - 0.95 mg/dL    GFR Estimate >90 >60 mL/min/1.73m2    Calcium 9.6 8.8 - 10.4 mg/dL    Glucose 253 (H) 70 - 99 mg/dL     Lab Results   Component Value Date    WBC 10.0 07/10/2025    HGB 7.5 (L) 07/10/2025    HCT 24.3 (L) 07/10/2025    MCV 75 (L) 07/10/2025     07/10/2025
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
## 2025-07-10 NOTE — PROGRESS NOTES
Dual Skin Assessment Note:    Patient admitted from ED from to P2.     Comprehensive skin inspection completed by myself and Erum Agudelo RN.     Abnormal skin assessment findings: None    LDA Initiated for skin breakdown/non-blanchable redness: Not applicable    Provider notified: Not applicable    If yes, WOC Consult order obtained: Not applicable    Anderson Anne RN 11:18 PM

## 2025-07-10 NOTE — PLAN OF CARE
Problem: Adult Inpatient Plan of Care  Goal: Absence of Hospital-Acquired Illness or Injury  Outcome: Progressing  Intervention: Identify and Manage Fall Risk  Recent Flowsheet Documentation  Taken 7/10/2025 0915 by Chloé Dorsey RN  Safety Promotion/Fall Prevention:   activity supervised   assistive device/personal items within reach   clutter free environment maintained   nonskid shoes/slippers when out of bed  Intervention: Prevent Infection  Recent Flowsheet Documentation  Taken 7/10/2025 0915 by Chloé Dorsey RN  Infection Prevention:   hand hygiene promoted   rest/sleep promoted   single patient room provided     Problem: Adult Inpatient Plan of Care  Goal: Optimal Comfort and Wellbeing  Outcome: Progressing  Intervention: Monitor Pain and Promote Comfort  Recent Flowsheet Documentation  Taken 7/10/2025 0915 by Chloé Dorsey RN  Pain Management Interventions: medication (see MAR)     Problem: Anemia  Goal: Anemia Symptom Improvement  Outcome: Progressing  Intervention: Monitor and Manage Anemia  Recent Flowsheet Documentation  Taken 7/10/2025 0915 by Chloé Dorsey RN  Safety Promotion/Fall Prevention:   activity supervised   assistive device/personal items within reach   clutter free environment maintained   nonskid shoes/slippers when out of bed     Problem: Pain Acute  Goal: Optimal Pain Control and Function  Outcome: Progressing  Intervention: Develop Pain Management Plan  Recent Flowsheet Documentation  Taken 7/10/2025 0915 by Chloé Dorsey RN  Pain Management Interventions: medication (see MAR)  Pt nauseated and painful this am.  Oxycodone and zofran given with good results.  Pt was able to eat this am and she felt better after meal.    Her HBG was 7.5 this am, transfusion was ordered and given without transfusion reaction.    Pt requested someone from [facility] health visit her.  See note for details of visit.

## 2025-07-10 NOTE — PROGRESS NOTES
[facility]    Medicine Progress Note - Hospitalist Service    Date of Admission:  7/9/2025    Assessment & Plan      Monica Doshi is a 39 year old female with history of Endometrial Cancer, Asthma, GERD who was admitted on 7/9/25 after presenting to [facility] ED for Acute Anemia.    She was transfused with 2 units of PRBC. She is scheduled for surgery on 7/15/25 by OB/GYN service.  Patient reported history of hepatitis B.  Hepatitis panel ordered    #Acute Blood Loss Anemia  #Endometrial Cancer  CTA A/P showed fluid within the endometrial canal and proximal vaginal canal likely representing blood clots. She was transfused with 2 units PRBCs in the ED. Ordered additional 1 unit of PRBC.  -Surgery tentatively scheduled for 7/15/2025 by OB/GYN  - OB/GYN [id number] assistance    #Type 2 Diabetes  - Last A1c was 6.5% in 05/2025  - Home medications include metformin, tirzepatide  - Hold home metformin, tirzepatide  - Sliding Scale Insulin with Glucose Checks    Concern for chronic hepatitis  -Follow-up hepatitis panel  -Monitor LFT  Will consider GI/hepatology evaluation depending on results    #Pulmonary Nodules  -Unclear whether this is secondary to metastasis from endometrial cancer  -Incidental finding on CT Abd in the ED  -IR biopsy scheduled    #Mood Disorder - Continue PTA Fluoxetine  #Asthma - Continue PTA Inhaler  #GERD - Continue PTA Famotidine/Omeprazole      Diet: Moderate Consistent Carb (60 g CHO per Meal) Diet    DVT Prophylaxis: Pneumatic Compression Devices  Navarro Catheter: Not present  Lines: None     Cardiac Monitoring: None  Code Status: Full Code      Clinically Significant Risk Factors Present on Admission         # Hyponatremia: Lowest Na = 134 mmol/L in last 2 days, will monitor as appropriate                # Anemia: based on hgb <11  # Anemia: based on hgb <11       # Morbid Obesity: Estimated body mass index is 42.91 kg/m  as calculated from the following:    Height as  "of this encounter: 1.626 m (5' 4\").    Weight as of this encounter: 113.4 kg (250 lb).       # Asthma: noted on problem list        Social Drivers of Health    Food Insecurity: High Risk (7/10/2025)    Food Insecurity     Within the past 12 months, did you worry that your food would run out before you got money to buy more?: Yes     Within the past 12 months, did the food you bought just not last and you didn t have money to get more?: No   Housing Stability: High Risk (7/10/2025)    Housing Stability     Do you have housing? : Yes     Are you worried about losing your housing?: Yes   Tobacco Use: High Risk (1/31/2025)    Received from [facility]    Patient History     Smoking Tobacco Use: Every Day     Smokeless Tobacco Use: Never     Passive Exposure: Never   Social Connections: Unknown (5/10/2024)    Social Connection and Isolation Panel [NHANES]     Frequency of Social Gatherings with Friends and Family: Once a week          Disposition Plan     Medically Ready for Discharge: Anticipated in 2-4 Days      Jannette Macias MD  Hospitalist Service  [facility]  Securely message with GetSet (more info)  Text page via [employer] Paging/Directory   ______________________________________________________________________    Interval History   She complains of generalized weakness.  She was transfused with 2 units of PRBC overnight.  Hemoglobin is 7.5 this morning.      Physical Exam   Vital Signs: Temp: 98.1  F (36.7  C) Temp src: Oral BP: 124/72 Pulse: 80   Resp: 18 SpO2: 99 % O2 Device: Nasal cannula Oxygen Delivery: 1 LPM  Weight: 250 lbs 0 oz    General appearance: Obese, pale, awake, Alert, Cooperative, not in any obvious distress and appears stated age   HEENT: Normocephalic, atraumatic, conjunctiva clear without icterus and ears without discharge  Lungs: Clear to auscultation bilaterally, no wheezing, good air exchange, normal work of breathing  Cardiovascular: Regular Rate and Rythm, " normal apical impulse, normal S1 and S2, no lower extremity edema bilaterally  Abdomen: Soft, non-tender and Non-distended, active bowel sounds  Skin: Skin color, texture normal and bruising or bleeding. No rashes or lesions over face, neck, arms and legs, turgor normal.  Musculoskeletal: No bony deformities or joint tenderness. Normal ROM upon flexion & extension.   Neurologic: Alert & Oriented X 3, Facial symmetry preserved and upper & lower extremities moving well with symmetry  Psychiatric: Calm, normal eye contact and normal affect      Medical Decision Making       55 MINUTES SPENT BY ME on the date of service doing chart review, history, exam, documentation & further activities per the note.      Data

## 2025-07-10 NOTE — ED NOTES
"[facility] ED Handoff Report    ED Chief Complaint: Vaginal bleeding    ED Diagnosis:  (D62) Anemia due to blood loss, acute    (N93.9) Vaginal bleeding      (C54.1) Endometrial cancer (H)      (R91.1) Pulmonary nodule         PMH:    Past Medical History:   Diagnosis Date    Chlamydia 2013    Diabetes (H)     Endometrial hyperplasia, complex 05/05/2016 5/16: Endometrial echo 20 mm, thickened.  Pt is bleeding, the measured thickness appears to have vascular flow within, may wish to consider hysteroscopy and/or D&C for optimal evaluation.  5/14: Endometrium, biopsy --  Focal complex hyperplasia without atypia and rare squamous   Morules, No malignancy seen. Some studies report an associated increased risk of concurrent or   future development of e    Gastroesophageal reflux disease     Hepatitis     Infertility counseling 05/05/2021    Trying from 3754-1724 without pregnancy. Oligomenorrhea.    Major depressive disorder, recurrent, in full remission 01/02/2013    Marijuana dependence (H) 02/05/2009    Obese     Secondary oligomenorrhea 05/05/2021    PCOS?        Code Status:  Full Code     Current Living Situation/Residence: lives alone     Elimination Status: Continent: Yes     Activity Level: Independent    Patients Preferred Language:  English     Needed: No    Vital Signs:  BP (!) 145/85   Pulse 83   Temp 98.9  F (37.2  C)   Resp 19   Ht 1.626 m (5' 4\")   Wt 113.4 kg (250 lb)   SpO2 100%   BMI 42.91 kg/m       Cardiac Rhythm: NSR    Pain Score: 0/10    Is the Patient Confused:  No    Last Food or Drink: 07/09/25 at 1900 had water, had food at 1100 this morning at home.    Focused Assessment:  LS clear. Pt received one unit of blood. Second unit hanging at this time. Pt very drowsy, painful and nauseated upon arrival to ED, face pale. Nausea subsided, zofran given. Dilaudid given for pain. Color improved after first unit of blood. Pt states she changed pad prior to EMS arrival at her " house, pad checked upon arrival to room 19, light spotting noted on pad.     Tests Performed: Done: Labs and Imaging    Treatments Provided:  2 units PRBCs    Family Dynamics/Concerns: No    Family Updated On Visitor Policy: No    Plan of Care Communicated to Family: No    Who Was Updated about Plan of Care: pt    Belongings Checklist Done and Signed by Patient: Yes    Medications sent with patient: N/A        RN: Lizzie Fagan RN   7/9/2025 7:15 PM

## 2025-07-10 NOTE — CONSULTS
GYN/ONC CONSULT  Patient Name: Monica Doshi  Address: [address] [place] [address]  Age:39 year old, : 1985   Sex: female   Admission Date/Time: 2025  2:55 PM   CC: I was asked to see Ms. Monica Doshi by Dr. 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 She was unable to enroll in [employer] [id number]. She is s/p Mirena IUD insertion 24 and thereafter was lost to follow up. She presents today to review recent imaging.     Clinical Course:  5/10/24: Monica presented with very heavy menstrual bleeding on a daily basis. Currently on oral iron for blood loss anemia. Plans to get pregnant. She has been having heavier cyclical bleeding, followed by prolonged bleeding for 5 years.  24: Pelvic ultrasound showed endometrium measures 26.8 nm. Appears diffusely heterogeneous with hypervascularity.  24: She underwent a hysteroscopy, D&C with polypectomy. 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 DNA MMR all intact.  24: EKG was normal with biplane LVEF 59%.  24: Pelvic MRI showed abnormal enhancing signal within the endometrium, measuring up to 4.1 cm, likely compatible with patient's known primary neoplasm.  24: Mirena IUD placement, UHCG negative.  25: Pelvic US showed solid mass in the lower uterine segment/cervix measuring 4.6 x 4.1 x 4.1 cm. While this may represent a fibroid is slightly more homogeneous than typical. This will be better evaluated on the MRI. Minimally heterogeneous endometrium. Intrauterine device within the endometrial cavity. Cyst within the right ovary measuring approximately 3.3 cm, likely functional follicular cyst. The left ovary is not identified, presumably  due to transabdominal technique.  Pelvic MRI showed large malignant appearing infiltrative mass 4.5 x 5.2 x 6.6 cm in the lower uterine segment and cervix. Mild Extension of the mass superiorly into the right fundus of the uterus. No extension into the vagina. Direct invasion into the adjacent left parametrium. No pelvic sidewall involvement. Left ovary likely in minimally involved. Tiny 7 mm bilateral internal iliac lymph node although remains within normal size limits concerning for possible lymph node metastasis.  6/13/25 Consult with Dr. Abe Isbell to review imaging. Recommend CT CAP and proceeding with surgical intervention.    Interval history:   Patient reports that delay in getting surgery scheduled was because she has not had a chance to get a hold of her family. She has spoken to her cousin who lives locally but not her siblings that live in [place].  Over the past 7 days she has been having progressively worsening bleeding, needing to change a depends every other hour, completely saturated. Also passing fist size clots.  She has felt nauseated with multiple episodes of vomiting. She feels that she at one point passed out on her bathroom floor.  Given worsening symptoms she presented to ED via EMS.    In ED she was pale, tachycardic and diaphoretic. Hgb 5.7, 2 units prbc given. CTA A/P showed fluid within endometrial canal and proximal vaginal canal likely representing blood clots but no active bleeding noted as well as bilateral pulmonary nodules. CT chest showed 10mm bilateral pulmonary nodules.   Today Hgb is 7.5. No significant bleeding since admission. She still feels very weak and nauseated but also anxious to eat.  Mild lower abdominal pain. No changes in urination, she has not had BM in a few days but feels like she needs to.     REVIEW OF SYSTEMS  GENERAL: +Malaise +generalized weakness no fevers or chills, recent weight loss  HEENT: no URI symptoms, no changes with vision, no difficulty swallowing    CARDIOVASCULAR: denies chest pain, palpitations, dyspnea on exertion  PULMONARY: no SOB, no cough, no history of asthma  GI: denies melena, BRBPR, hematemesis, GERD, constipation, or diarrhea  : no dysuria, hematuria   GYN: ++vaginal bleeding.   SKIN: no recent rashes or discoloration, no jaundice  HEME: no prior history of blood product transfusions, no clotting or bleeding disorders  ENDOCRINE: no history of diabetes or thyroid problems  NEURO: no history of seizures, no headaches, no neurologic disorders    PAST MEDICAL HISTORY    Past Medical History:   Diagnosis Date    Chlamydia 2013    Diabetes (H)     Endometrial hyperplasia, complex 05/05/2016 5/16: Endometrial echo 20 mm, thickened.  Pt is bleeding, the measured thickness appears to have vascular flow within, may wish to consider hysteroscopy and/or D&C for optimal evaluation.  5/14: Endometrium, biopsy --  Focal complex hyperplasia without atypia and rare squamous   Morules, No malignancy seen. Some studies report an associated increased risk of concurrent or   future development of e    Gastroesophageal reflux disease     Hepatitis     Infertility counseling 05/05/2021    Trying from 4358-1746 without pregnancy. Oligomenorrhea.    Major depressive disorder, recurrent, in full remission 01/02/2013    Marijuana dependence (H) 02/05/2009    Obese     Secondary oligomenorrhea 05/05/2021    PCOS?      PAST SURGICAL HISTORY    Past Surgical History:   Procedure Laterality Date    DILATION AND CURETTAGE, OPERATIVE HYSTEROSCOPY, COMBINED N/A 6/7/2024    Procedure: HYSTEROSCOPY WITH BIOPSY OF ENDOMETRIUM AND POLYPECTOMY;  Surgeon: Magali Ramirez DO;  Location: [facility] OR    NO PAST SURGERIES         CURRENT MEDS    Current Facility-Administered Medications   Medication Dose Route Frequency Provider Last Rate Last Admin    acetaminophen (TYLENOL) tablet 650 mg  650 mg Oral Q4H PRN Jono Julio MD        Or    acetaminophen (TYLENOL)  Suppository 650 mg  650 mg Rectal Q4H PRN Jono Julio MD        albuterol (PROVENTIL HFA/VENTOLIN HFA) inhaler  2 puff Inhalation Q4H PRN Jono Julio MD        calcium carbonate (TUMS) chewable tablet 1,000 mg  1,000 mg Oral 4x Daily PRN Jono Julio MD        cetirizine (zyrTEC) tablet 10 mg  10 mg Oral Daily Jono Julio MD   10 mg at 07/10/25 0916    glucose gel 15-30 g  15-30 g Oral Q15 Min PRN Jono Julio MD        Or    dextrose 50 % injection 25-50 mL  25-50 mL Intravenous Q15 Min PRN Jono Julio MD        Or    glucagon injection 1 mg  1 mg Subcutaneous Q15 Min PRN Jono Julio MD        famotidine (PEPCID) tablet 20 mg  20 mg Oral BID Jono Julio MD        ferrous gluconate (FERGON) tablet 324 mg  324 mg Oral Every Other Day Jono Julio MD   324 mg at 07/10/25 0916    FLUoxetine (PROzac) capsule 40 mg  40 mg Oral Daily Jono Julio MD        fluticasone (FLONASE) 50 MCG/ACT spray 2 spray  2 spray Both Nostrils Daily Jono Julio MD        insulin aspart (NovoLOG) injection (RAPID ACTING)  1-7 Units Subcutaneous TID AC Jono Julio MD   1 Units at 07/10/25 0914    insulin aspart (NovoLOG) injection (RAPID ACTING)  1-5 Units Subcutaneous At Bedtime Jono Julio MD   1 Units at 07/10/25 0200    lidocaine (LMX4) cream   Topical Q1H PRN Jono Julio MD        lidocaine 1 % 0.1-1 mL  0.1-1 mL Other Q1H PRN Jono Julio MD        melatonin tablet 5 mg  5 mg Oral At Bedtime PRN Jono Juloi MD        naloxone (NARCAN) injection 0.2 mg  0.2 mg Intravenous Q2 Min PRN Jono Julio MD        Or    naloxone (NARCAN) injection 0.4 mg  0.4 mg Intravenous Q2 Min PRN Jono Julio MD        Or    naloxone (NARCAN) injection 0.2 mg  0.2 mg Intramuscular Q2 Min PRN Jono Julio MD        Or    naloxone (NARCAN) injection 0.4 mg  0.4 mg Intramuscular Q2 Min PRN Jono Julio MD        ondansetron (ZOFRAN ODT) ODT tab 4 mg  4  mg Oral Q6H PRN Jono Julio MD        Or    ondansetron (ZOFRAN) injection 4 mg  4 mg Intravenous Q6H PRN Jono Julio MD   4 mg at 07/10/25 0808    oxyCODONE (ROXICODONE) tablet 5 mg  5 mg Oral Q4H PRN Jono Julio MD   5 mg at 07/10/25 0916    Or    oxyCODONE (ROXICODONE) tablet 10 mg  10 mg Oral Q4H PRN Jono Julio MD        pantoprazole (PROTONIX) EC tablet 40 mg  40 mg Oral QAM AC Jono Julio MD   40 mg at 07/10/25 0916    polyethylene glycol (MIRALAX) Packet 17 g  17 g Oral BID PRN Jono Julio MD        senna-docusate (SENOKOT-S/PERICOLACE) 8.6-50 MG per tablet 1 tablet  1 tablet Oral BID PRN Jono Julio MD        Or    senna-docusate (SENOKOT-S/PERICOLACE) 8.6-50 MG per tablet 2 tablet  2 tablet Oral BID PRN Jono Julio MD        sodium chloride (PF) 0.9% PF flush 3 mL  3 mL Intracatheter Q8H Jono Julio MD   3 mL at 07/10/25 0556    sodium chloride (PF) 0.9% PF flush 3 mL  3 mL Intracatheter q1 min prn Jono Julio MD        traZODone (DESYREL) tablet 100 mg  100 mg Oral At Bedtime Jono Julio MD        vitamin C (ASCORBIC ACID) tablet 1,000 mg  1,000 mg Oral Daily Jono Julio MD   1,000 mg at 07/10/25 0915    Vitamin D3 (CHOLECALCIFEROL) tablet 100 mcg  100 mcg Oral Daily Jono Julio MD   100 mcg at 07/10/25 0916     ALLERGIES/SENSITIVITIES    Allergies   Allergen Reactions    Zoloft      Sharp pains in arms     FAMILY HISTORY    Family History   Problem Relation Age of Onset    ALS Mother 73    Hypertension Father     Hepatitis Father          of liver failure due to hep B 65y    ALS Maternal Uncle      SOCIAL HISTORY    Social History     Socioeconomic History    Marital status: Single     Spouse name: Not on file    Number of children: 0    Years of education: Not on file    Highest education level: Not on file   Occupational History    Not on file   Tobacco Use    Smoking status: Every Day     Current packs/day: 0.10      Average packs/day: 0.1 packs/day for 10.0 years (1.0 ttl pk-yrs)     Types: Cigarettes    Smokeless tobacco: Never   Vaping Use    Vaping status: Never Used   Substance and Sexual Activity    Alcohol use: No    Drug use: Yes     Types: Marijuana     Comment: Drug use: h/o positive amphetamines on screen    Sexual activity: Yes     Partners: Male     Birth control/protection: None     Comment: Planning pregnancy (experiencing infertility)   Other Topics Concern    Not on file   Social History Narrative    Not on file     Social Drivers of Health     Financial Resource Strain: Low Risk  (7/10/2025)    Financial Resource Strain     Within the past 12 months, have you or your family members you live with been unable to get utilities (heat, electricity) when it was really needed?: No   Food Insecurity: High Risk (7/10/2025)    Food Insecurity     Within the past 12 months, did you worry that your food would run out before you got money to buy more?: Yes     Within the past 12 months, did the food you bought just not last and you didn t have money to get more?: No   Transportation Needs: Low Risk  (7/10/2025)    Transportation Needs     Within the past 12 months, has lack of transportation kept you from medical appointments, getting your medicines, non-medical meetings or appointments, work, or from getting things that you need?: No   Physical Activity: Sufficiently Active (5/10/2024)    Exercise Vital Sign     Days of Exercise per Week: 3 days     Minutes of Exercise per Session: 60 min   Stress: No Stress Concern Present (5/10/2024)    [employer] - Occupational Stress Questionnaire     Feeling of Stress : Only a little   Social Connections: Unknown (5/10/2024)    Social Connection and Isolation Panel [NHANES]     Frequency of Communication with Friends and Family: Not on file     Frequency of Social Gatherings with Friends and Family: Once a week     Attends Restorationist Services: Not on file  "    Active Member of Clubs or Organizations: Not on file     Attends Club or Organization Meetings: Not on file     Marital Status: Not on file   Interpersonal Safety: Low Risk  (7/10/2025)    Interpersonal Safety     Do you feel physically and emotionally safe where you currently live?: Yes     Within the past 12 months, have you been hit, slapped, kicked or otherwise physically hurt by someone?: No     Within the past 12 months, have you been humiliated or emotionally abused in other ways by your partner or ex-partner?: No   Housing Stability: High Risk (7/10/2025)    Housing Stability     Do you have housing? : Yes     Are you worried about losing your housing?: Yes      PHYSICAL EXAM  BP (!) 142/86 (BP Location: Right arm)   Pulse 86   Temp 97.6  F (36.4  C) (Oral)   Resp 20   Ht 1.626 m (5' 4\")   Wt 113.4 kg (250 lb)   SpO2 98%   BMI 42.91 kg/m    Body mass index is 42.91 kg/m .    GENERAL: pale, NAD    HEENT: normocephalic, no scleral icterus,   NECK: supple  CV: RRR  LUNGS:  No dyspnea  ABDOMEN: Non distended, +BS, soft, mild lower abdominal tenderness, no guarding or rebound. No hernias, no surgical scars  RECTAL/GENITAL: deferred  EXTREMITIES:  No edema, no calf tenderness  SKIN: warm and dry, no jaundice, no rashes  NEURO:  motor exam intact   PSYCH: appropriate mood and affect    LABS  Recent Labs   Lab Test 07/10/25  0601 07/09/25  2342   WBC 10.0 10.8   RBC 3.26* 3.35*   HGB 7.5* 7.7*   HCT 24.3* 25.1*   MCV 75* 75*   MCH 23.0* 23.0*   MCHC 30.9* 30.7*    285     Recent Labs   Lab Test 07/10/25  0601 07/09/25  1931 07/09/25  1504   POTASSIUM 4.3 4.2 4.3   CHLORIDE 105  --  103   BUN 11.3  --  16.5       Recent Labs   Lab Test 07/10/25  0601 02/22/24  1625 10/07/22  0907   PROTEIN  --   --  30*   BILITOTAL 0.7 0.4  --    AST 10 18  --    ALT 10 27  --      INR (no units)   Date Value   07/09/2025 1.04         IMAGING    Narrative & Impression   EXAM: CTA ABDOMEN PELVIS WITH " CONTRAST  LOCATION: [facility]  DATE: 7/9/2025     INDICATION: vaginal bleeding evaluate for active hemorrhage  COMPARISON: 5/30/2025.  TECHNIQUE: CT angiogram abdomen pelvis during arterial and venous phase of injection of IV contrast. 2D and 3D MIP reconstructions were performed by the CT technologist. Dose reduction techniques were used.  CONTRAST: 90ml isovue 370 from a single use vial     FINDINGS:  ANGIOGRAM ABDOMEN/PELVIS: The abdominal aorta, visceral arteries, and visualized iliac arteries are widely patent. Visualized lower extremity arteries are widely patent and unremarkable.     LOWER CHEST: Mild cardiomegaly. 5 mm nodule in the anterior right lung base. Respiratory motion artifact. Scattered atelectasis. A 0.8 x 0.9 cm mass in the lingula. No pleural effusion or pneumothorax.     HEPATOBILIARY: Normal.     PANCREAS: Normal.     SPLEEN: Normal.     ADRENAL GLANDS: Normal.     KIDNEYS/BLADDER: Normal.     BOWEL: Normal.     LYMPH NODES: Normal.     PELVIC ORGANS: IUD in appropriate position. Fluid within the endometrial canal and within the proximal vagina, likely representing blood clots. No active extravasation.     MUSCULOSKELETAL: Normal.                                                                      IMPRESSION:  1.  Fluid within the endometrial canal and proximal vaginal canal likely representing blood clots. No active bleeding identified.  2.  2 visualized pulmonary nodules, largest measuring 0.8 x 0.9 cm within the lingula. Recommend CT of the chest without contrast for further evaluation.       Narrative & Impression   EXAM: CT CHEST W/O CONTRAST  LOCATION: [facility]  DATE: 7/10/2025     INDICATION: Pulmonary Nodules  COMPARISON: CT abdomen/pelvis from 7/9/2025.  TECHNIQUE: CT chest without IV contrast. Multiplanar reformats were obtained. Dose reduction techniques were used.  CONTRAST: None.     FINDINGS:   LUNGS AND PLEURA: Bandlike  atelectasis in the right lower lobe. Again demonstrated are 2 adjacent nodules in the inferior segment of the lingula, one of which measures 9 mm on image 186 of series 4 and the second of which measures 7 mm on image 194.   There is also right-sided nodules including a 10 mm nodule in the anterior, medial aspect of the right middle lobe on image 131 of series a 5 mm nodule in the inferior, lateral segment of the right middle lobe on image 151. No pneumothorax or pleural   effusion.     MEDIASTINUM/AXILLAE: Cardiac size at upper limits normal. No pericardial effusion.     CORONARY ARTERY CALCIFICATION: None.     UPPER ABDOMEN: No acute findings.     MUSCULOSKELETAL: Normal.                                                                      IMPRESSION:   1.  Bilateral pulmonary nodules measuring up to 10 mm, of indeterminate etiology or significance in absence of comparison thoracic imaging. Recommend pulmonology consultation for further management and follow-up.         ASSESSMENT AND PLAN :   39 year old female with endometrial cancer, diagnosed back in May 2024. Mirena IUD placed and patient was lost to follow up due to lapse in insurance.  Recently seen as outpatient with imaging concerning for disease progression. Now admitted for profound anemia from worsening vaginal bleeding.   Endometrial cancer:   -concern for progression on 5/30/25 MRI scan, discussion for surgical intervention at that time  -Will repeat MRI pelvis to ensure no significant changes  -Discussed with patient that given significant bleeding would like to proceed with surgical intervention during hospitalization. Tentatively scheduled for next Tuesday  -Hgb at least between 8-9 in preparation for surgery  -Patient understands with plan    Pulmonary nodule  -Bilateral pulmonary nodules noted on CT chest, largest size 10mm  -Discussed with Dr Sen from pulmonology. Case reviewed with IR and amenable for biopsy  -Plan to schedule this for  today    DM  -blood sugar management per medicine team, appreciate assistance    Hepatitis B  -Patient reports history of hepatitis B but admits to improper follow up  -Liver labs wnl  -Notified medicine team     Discussed plan with Dr. Abe Isbell, Dr. Sen and Dr. Macias.     SAM Joyce

## 2025-07-10 NOTE — CONSULTS
Care Management Initial Consult    General Information  Assessment completed with: Patient, patient  Type of CM/SW Visit: Initial Assessment    Primary Care Provider verified and updated as needed: Yes   Readmission within the last 30 days:           Advance Care Planning: Advance Care Planning Reviewed: no concerns identified          Communication Assessment  Patient's communication style: spoken language (English or Bilingual)    Hearing Difficulty or Deaf: no   Wear Glasses or Blind: yes    Cognitive  Cognitive/Neuro/Behavioral: WDL                      Living Environment:   People in home: alone     Current living Arrangements: apartment      Able to return to prior arrangements: yes  Living Arrangement Comments: apartment - alone    Family/Social Support:  Care provided by: self  Provides care for: no one  Marital Status: Single  Support system: Friend          Description of Support System: Supportive, Involved    Support Assessment: Adequate family and caregiver support    Current Resources:   Patient receiving home care services: No        Community Resources: [facility]  Equipment currently used at home: none  Supplies currently used at home: None    Employment/Financial:  Employment Status: unemployed        Financial Concerns: eviction pending (pt states she is behind on rent)   Referral to Financial Worker: No       Does the patient's insurance plan have a 3 day qualifying hospital stay waiver?  No    Lifestyle & Psychosocial Needs:  Social Drivers of Health     Food Insecurity: High Risk (7/10/2025)    Food Insecurity     Within the past 12 months, did you worry that your food would run out before you got money to buy more?: Yes     Within the past 12 months, did the food you bought just not last and you didn t have money to get more?: No   Depression: Not at risk (5/10/2024)    PHQ-2     PHQ-2 Score: 2   Housing Stability: High Risk (7/10/2025)    Housing Stability     Do you have housing? : Yes      Are you worried about losing your housing?: Yes   Tobacco Use: High Risk (1/31/2025)    Received from [facility]    Patient History     Smoking Tobacco Use: Every Day     Smokeless Tobacco Use: Never     Passive Exposure: Never   Financial Resource Strain: Low Risk  (7/10/2025)    Financial Resource Strain     Within the past 12 months, have you or your family members you live with been unable to get utilities (heat, electricity) when it was really needed?: No   Alcohol Use: Not on file   Transportation Needs: Low Risk  (7/10/2025)    Transportation Needs     Within the past 12 months, has lack of transportation kept you from medical appointments, getting your medicines, non-medical meetings or appointments, work, or from getting things that you need?: No   Physical Activity: Sufficiently Active (5/10/2024)    Exercise Vital Sign     Days of Exercise per Week: 3 days     Minutes of Exercise per Session: 60 min   Interpersonal Safety: Low Risk  (7/10/2025)    Interpersonal Safety     Do you feel physically and emotionally safe where you currently live?: Yes     Within the past 12 months, have you been hit, slapped, kicked or otherwise physically hurt by someone?: No     Within the past 12 months, have you been humiliated or emotionally abused in other ways by your partner or ex-partner?: No   Stress: No Stress Concern Present (5/10/2024)    [employer] [place] [employer] - Occupational Stress Questionnaire     Feeling of Stress : Only a little   Social Connections: Unknown (5/10/2024)    Social Connection and Isolation Panel [NHANES]     Frequency of Communication with Friends and Family: Not on file     Frequency of Social Gatherings with Friends and Family: Once a week     Attends [employer] Services: Not on file     Active Member of Clubs or Organizations: Not on file     Attends Club or Organization Meetings: Not on file     Marital Status: Not on file   Health Literacy: Not on file        Functional Status:  Prior to admission patient needed assistance:   Dependent ADLs:: Independent  Dependent IADLs:: Independent  Assesssment of Functional Status: Not at baseline with ADL Functioning    Mental Health Status:  Mental Health Status: No Current Concerns       Chemical Dependency Status:  Chemical Dependency Status: No Current Concerns             Values/Beliefs:  Spiritual, Cultural Beliefs, Adventism Practices, Values that affect care:                 Discussed  Partnership in Safe Discharge Planning  document with patient/family: No    Additional Information:  SW met with patient at bedside to introduce self, CM role and complete initial assessment. Patient pleasant and glad for SW visit.   Patient lives alone in an apartment. She is currently not working and was receiving unemployment up until last week.   Patient is interested in resources as she is not working. She states her landlord verbally informed her he was going to evict her as she is behind on the rent. SW offered to assist with resources. Pt would like more information on [place] State Laws for eviction and landlords. SW reviewed information on [place]  website and provided printed information. Patient states she does receive a small amount of EBT. SW will provide resources on applying for more [facility] programs as she currently has no income. She reports she has been exhausted and requesting some support with applications.   If she does need to be out of her apartment , she reports having a friends home she can stay at, however is also worried about recovery and needing help for surgery she may need. She reports having two good friends that live in her apartment and she considers them supportive.     Next Steps: SW will follow and assist with applications as able.     Ella [place], Dorothea Dix Psychiatric CenterSW

## 2025-07-10 NOTE — PLAN OF CARE
Goal Outcome Evaluation:    Problem: Comorbidity Management  Goal: Blood Glucose Levels Within Targeted Range  Outcome: Progressing     Problem: Anemia  Goal: Anemia Symptom Improvement  Outcome: Progressing

## 2025-07-11 ENCOUNTER — APPOINTMENT (OUTPATIENT)
Dept: ULTRASOUND IMAGING | Facility: HOSPITAL | Age: 40
DRG: 740 | End: 2025-07-11
Attending: RADIOLOGY
Payer: COMMERCIAL

## 2025-07-11 ENCOUNTER — APPOINTMENT (OUTPATIENT)
Dept: RADIOLOGY | Facility: HOSPITAL | Age: 40
DRG: 740 | End: 2025-07-11
Attending: RADIOLOGY
Payer: COMMERCIAL

## 2025-07-11 ENCOUNTER — APPOINTMENT (OUTPATIENT)
Dept: MRI IMAGING | Facility: HOSPITAL | Age: 40
DRG: 740 | End: 2025-07-11
Attending: PHYSICIAN ASSISTANT
Payer: COMMERCIAL

## 2025-07-11 LAB
ERYTHROCYTE [DISTWIDTH] IN BLOOD BY AUTOMATED COUNT: 18.5 % (ref 10–15)
GLUCOSE BLDC GLUCOMTR-MCNC: 162 MG/DL (ref 70–99)
GLUCOSE BLDC GLUCOMTR-MCNC: 216 MG/DL (ref 70–99)
GLUCOSE BLDC GLUCOMTR-MCNC: 233 MG/DL (ref 70–99)
GLUCOSE BLDC GLUCOMTR-MCNC: 250 MG/DL (ref 70–99)
GLUCOSE BLDC GLUCOMTR-MCNC: 262 MG/DL (ref 70–99)
HCT VFR BLD AUTO: 29.6 % (ref 35–47)
HGB BLD-MCNC: 9.3 G/DL (ref 11.7–15.7)
HOLD SPECIMEN: NORMAL
MAGNESIUM SERPL-MCNC: 2.1 MG/DL (ref 1.7–2.3)
MCH RBC QN AUTO: 24 PG (ref 26.5–33)
MCHC RBC AUTO-ENTMCNC: 31.4 G/DL (ref 31.5–36.5)
MCV RBC AUTO: 77 FL (ref 78–100)
PHOSPHATE SERPL-MCNC: 3.5 MG/DL (ref 2.5–4.5)
PLATELET # BLD AUTO: 331 10E3/UL (ref 150–450)
POTASSIUM SERPL-SCNC: 4 MMOL/L (ref 3.4–5.3)
RBC # BLD AUTO: 3.87 10E6/UL (ref 3.8–5.2)
WBC # BLD AUTO: 13.4 10E3/UL (ref 4–11)

## 2025-07-11 PROCEDURE — 36415 COLL VENOUS BLD VENIPUNCTURE: CPT | Performed by: INTERNAL MEDICINE

## 2025-07-11 PROCEDURE — 84132 ASSAY OF SERUM POTASSIUM: CPT | Performed by: INTERNAL MEDICINE

## 2025-07-11 PROCEDURE — 88172 CYTP DX EVAL FNA 1ST EA SITE: CPT | Mod: TC | Performed by: RADIOLOGY

## 2025-07-11 PROCEDURE — 250N000011 HC RX IP 250 OP 636: Performed by: RADIOLOGY

## 2025-07-11 PROCEDURE — 0BBK3ZX EXCISION OF RIGHT LUNG, PERCUTANEOUS APPROACH, DIAGNOSTIC: ICD-10-PCS | Performed by: RADIOLOGY

## 2025-07-11 PROCEDURE — 32408 CORE NDL BX LNG/MED PERQ: CPT

## 2025-07-11 PROCEDURE — 99232 SBSQ HOSP IP/OBS MODERATE 35: CPT | Performed by: INTERNAL MEDICINE

## 2025-07-11 PROCEDURE — 72197 MRI PELVIS W/O & W/DYE: CPT

## 2025-07-11 PROCEDURE — 88172 CYTP DX EVAL FNA 1ST EA SITE: CPT | Mod: 26 | Performed by: PATHOLOGY

## 2025-07-11 PROCEDURE — 85027 COMPLETE CBC AUTOMATED: CPT | Performed by: INTERNAL MEDICINE

## 2025-07-11 PROCEDURE — 255N000002 HC RX 255 OP 636: Performed by: PHYSICIAN ASSISTANT

## 2025-07-11 PROCEDURE — 83735 ASSAY OF MAGNESIUM: CPT | Performed by: INTERNAL MEDICINE

## 2025-07-11 PROCEDURE — A9585 GADOBUTROL INJECTION: HCPCS | Performed by: PHYSICIAN ASSISTANT

## 2025-07-11 PROCEDURE — 84100 ASSAY OF PHOSPHORUS: CPT | Performed by: INTERNAL MEDICINE

## 2025-07-11 PROCEDURE — 120N000001 HC R&B MED SURG/OB

## 2025-07-11 PROCEDURE — 88173 CYTOPATH EVAL FNA REPORT: CPT | Mod: 26 | Performed by: PATHOLOGY

## 2025-07-11 PROCEDURE — 88305 TISSUE EXAM BY PATHOLOGIST: CPT | Mod: 26 | Performed by: PATHOLOGY

## 2025-07-11 PROCEDURE — 250N000013 HC RX MED GY IP 250 OP 250 PS 637: Performed by: STUDENT IN AN ORGANIZED HEALTH CARE EDUCATION/TRAINING PROGRAM

## 2025-07-11 PROCEDURE — 999N000065 XR CHEST PORT 1 VIEW

## 2025-07-11 RX ORDER — FENTANYL CITRATE 50 UG/ML
25-50 INJECTION, SOLUTION INTRAMUSCULAR; INTRAVENOUS EVERY 5 MIN PRN
Refills: 0 | Status: DISCONTINUED | OUTPATIENT
Start: 2025-07-11 | End: 2025-07-11 | Stop reason: HOSPADM

## 2025-07-11 RX ORDER — NALOXONE HYDROCHLORIDE 0.4 MG/ML
0.4 INJECTION, SOLUTION INTRAMUSCULAR; INTRAVENOUS; SUBCUTANEOUS
Status: DISCONTINUED | OUTPATIENT
Start: 2025-07-11 | End: 2025-07-11 | Stop reason: HOSPADM

## 2025-07-11 RX ORDER — NALOXONE HYDROCHLORIDE 0.4 MG/ML
0.2 INJECTION, SOLUTION INTRAMUSCULAR; INTRAVENOUS; SUBCUTANEOUS
Status: DISCONTINUED | OUTPATIENT
Start: 2025-07-11 | End: 2025-07-11 | Stop reason: HOSPADM

## 2025-07-11 RX ORDER — GADOBUTROL 604.72 MG/ML
11 INJECTION INTRAVENOUS ONCE
Status: COMPLETED | OUTPATIENT
Start: 2025-07-11 | End: 2025-07-11

## 2025-07-11 RX ADMIN — GADOBUTROL 11 ML: 604.72 INJECTION INTRAVENOUS at 09:27

## 2025-07-11 RX ADMIN — Medication 1000 MG: at 08:08

## 2025-07-11 RX ADMIN — FENTANYL CITRATE 50 MCG: 50 INJECTION, SOLUTION INTRAMUSCULAR; INTRAVENOUS at 12:08

## 2025-07-11 RX ADMIN — FLUOXETINE HYDROCHLORIDE 40 MG: 20 CAPSULE ORAL at 08:11

## 2025-07-11 RX ADMIN — OXYCODONE HYDROCHLORIDE 10 MG: 5 TABLET ORAL at 13:25

## 2025-07-11 RX ADMIN — PANTOPRAZOLE SODIUM 40 MG: 40 TABLET, DELAYED RELEASE ORAL at 08:08

## 2025-07-11 RX ADMIN — INSULIN ASPART 1 UNITS: 100 INJECTION, SOLUTION INTRAVENOUS; SUBCUTANEOUS at 13:26

## 2025-07-11 RX ADMIN — FAMOTIDINE 20 MG: 20 TABLET ORAL at 08:08

## 2025-07-11 RX ADMIN — INSULIN ASPART 2 UNITS: 100 INJECTION, SOLUTION INTRAVENOUS; SUBCUTANEOUS at 08:07

## 2025-07-11 RX ADMIN — TRAZODONE HYDROCHLORIDE 100 MG: 50 TABLET ORAL at 21:53

## 2025-07-11 RX ADMIN — FAMOTIDINE 20 MG: 20 TABLET ORAL at 21:53

## 2025-07-11 RX ADMIN — INSULIN ASPART 3 UNITS: 100 INJECTION, SOLUTION INTRAVENOUS; SUBCUTANEOUS at 17:26

## 2025-07-11 RX ADMIN — CETIRIZINE HYDROCHLORIDE 10 MG: 10 TABLET, FILM COATED ORAL at 08:08

## 2025-07-11 RX ADMIN — Medication 100 MCG: at 08:08

## 2025-07-11 ASSESSMENT — ACTIVITIES OF DAILY LIVING (ADL)
ADLS_ACUITY_SCORE: 41
ADLS_ACUITY_SCORE: 42
ADLS_ACUITY_SCORE: 41
ADLS_ACUITY_SCORE: 42
ADLS_ACUITY_SCORE: 41
ADLS_ACUITY_SCORE: 42
ADLS_ACUITY_SCORE: 41
ADLS_ACUITY_SCORE: 42
ADLS_ACUITY_SCORE: 42
ADLS_ACUITY_SCORE: 41
ADLS_ACUITY_SCORE: 42

## 2025-07-11 NOTE — PROCEDURES
[facility]    Procedure: US guided chest nodule biopsy    Date/Time: 7/11/2025 12:47 PM    Performed by: Fahrner, Lester, MD  Authorized by: Fahrner, Lester, MD  IR Fellow Physician:    Pre Procedure Diagnosis: right chest nodule  Post Procedure Diagnosis: right chest nodule    UNIVERSAL PROTOCOL   Site Marked: Yes  Prior Images Obtained and Reviewed:  Yes  Required items: Required blood products, implants, devices and special equipment available    Patient identity confirmed:  Verbally with patient and provided demographic data  Patient was reevaluated immediately before administering moderate or deep sedation or anesthesia  Confirmation Checklist:  Patient's identity using two indicators, relevant allergies, procedure was appropriate and matched the consent or emergent situation and correct equipment/implants were available  Time out: Immediately prior to the procedure a time out was called    Universal Protocol: the Joint Commission Universal Protocol was followed    Preparation: Patient was prepped and draped in usual sterile fashion    ESBL (mL):  0     ANESTHESIA    Anesthesia:  See MAR for details  Local Anesthetic:  Lidocaine 1% without epinephrine  Anesthetic Total (mL):  6      SEDATION    Patient Sedated: No    See dictated procedure note for full details.  Findings: See US report for details.    Specimens: fine needle aspirate for cytological analysis    Procedural Complications: None    Condition: Stable      PROCEDURE    Patient Tolerance:  Patient tolerated the procedure well with no immediate complications  Length of time physician/provider present for 1:1 monitoring during sedation:  0 min

## 2025-07-11 NOTE — DISCHARGE INSTRUCTIONS
[facility] Assistance  [facility] offers cash assistance to eligible low-income residents through a variety of programs.    Apply for [place] Assistance through [place]. This is the fastest and easiest way to apply.    Contact Us  Program Information  [phone]    Toll free: [phone]    Hours: 8 a.m.-3:30 p.m.  [address] [place], [place] [address]

## 2025-07-11 NOTE — PLAN OF CARE
Goal Outcome Evaluation:    Pt AO x4. C/o pressure from biopsy site, PRN oxy x1. Able to make needs known. Up IND in the room.

## 2025-07-11 NOTE — DISCHARGE SUMMARY
"[facility]  Hospitalist Discharge Summary      Date of Admission:  7/9/2025  Date of Discharge:  7/11/2025  Discharging Provider: Jannette Macias MD  Discharge Service: Hospitalist Service    Discharge Diagnoses   Endometrial cancer with concern for disease progression  Severe acute blood loss anemia  Vaginal bleeding  Bilateral pulmonary nodules  Chronic hepatitis B infection    Clinically Significant Risk Factors     # Morbid Obesity: Estimated body mass index is 42.91 kg/m  as calculated from the following:    Height as of this encounter: 1.626 m (5' 4\").    Weight as of this encounter: 113.4 kg (250 lb).       Follow-ups Needed After Discharge   Follow-up Appointments       Hospital Follow-up with Existing Primary Care Provider (PCP)      Follow-up pending pelvic MRI report  Follow-up pending lung biopsy results  Need for outpatient labs and surgical follow-up  Robotic laparoscopic hysterectomy with bilateral salpingo-oophorectomy as planned on 7/15/2025  Patient advised to return to the ER in the event of bleeding or worsening symptoms or severe anemia to next OB/GYN evaluation.  Monitor LFTs  Outpatient follow-up for further management of chronic hepatitis at the GI clinic as arranged.    Schedule Primary Care visit within: 7 Days               Unresulted Labs Ordered in the Past 30 Days of this Admission       Date and Time Order Name Status Description    7/11/2025 12:43 PM Cytology, non-gynecologic In process         These results will be followed up by OBGYN oncologist, Dr. Isbell     Discharge Disposition   Discharged to home  [facility]    Hospital Course   Monica Doshi is a 39-year-old female with history of endometrial cancer, asthma, and GERD admitted on 7/9/2025 with severe acute blood loss anemia secondary to worsening vaginal bleeding in the setting of progressive endometrial cancer.    She was transfused with 3 units of PRBC. CT angiogram of the abdomen " and pelvis performed on 7/9/2025 showed fluid within the endometrial canal and proximal vaginal canal likely representing blood clots and 2 visualized pulmonary nodules, largest measuring 0.8 x 0.9 cm within the lingula. Report of pelvic MRI performed 7/11/2025 is pending.    Chest CT performed on 7/10/2025 showed bilateral pulmonary nodules measuring up to 10 mm pulmonologist recommends lung biopsy.  Patient was evaluated by pulmonologist who recommended lung biopsy which was subsequently performed by IR on 7/11/2025.  Report of US guided lung biopsy performed on 7/11/2025 is pending. Hepatitis panel ordered during hospital stay due to reports of chronic hepatitis returned positive for hepatitis B surface antigen.  She will follow-up at the GI clinic for further management of chronic hepatitis.      On 7/11/2025, she opted to leave the hospital against medical advice in order to address personal issues, including packing belongings in anticipation of an impending eviction from her current residence.  Patient was advised to remain in the hospital due to ongoing vaginal bleed and risk of severe anemia which might require further blood transfusion, however, she patient declined. OB/GYN oncology service has arranged for a repeat laboratory testing including hemoglobin check on 7/14/2025.  She is also scheduled to return for robotic-assisted total laparoscopic hysterectomy with bilateral salpingo-oophorectomy and sentinel lymph node biopsy on 7/15/2025.    Consultations This Hospital Stay   HEMATOLOGY & ONCOLOGY IP CONSULT  CARE MANAGEMENT / SOCIAL WORK IP CONSULT  PULMONARY IP CONSULT  SPIRITUAL HEALTH SERVICES IP CONSULT  INTERVENTIONAL RADIOLOGY ADULT/PEDS IP CONSULT    Code Status   Full Code    Time Spent on this Encounter   I, Jannette Macias MD, personally saw the patient today and spent greater than 30 minutes discharging this patient.       Jannette Macias MD  [facility]  [address] [address]  Phone: [phone]  Fax: [phone]  ______________________________________________________________________    Physical Exam   Vital Signs: Temp: 98.6  F (37  C) Temp src: Oral BP: 134/73 Pulse: 71   Resp: 18 SpO2: 100 % O2 Device: None (Room air)    Weight: 250 lbs 0 oz    General appearance: Obese, pale, awake, Alert, Cooperative, not in any obvious distress and appears stated age   HEENT: Normocephalic, atraumatic, conjunctiva clear without icterus and ears without discharge  Lungs: Clear to auscultation bilaterally, no wheezing, good air exchange, normal work of breathing  Cardiovascular: Regular Rate and Rythm, normal apical impulse, normal S1 and S2, no lower extremity edema bilaterally  Abdomen: Soft, non-tender and Non-distended, active bowel sounds  Skin: Skin color, texture normal and bruising or bleeding. No rashes or lesions over face, neck, arms and legs, turgor normal.  Musculoskeletal: No bony deformities or joint tenderness. Normal ROM upon flexion & extension.   Neurologic: Alert & Oriented X 3, Facial symmetry preserved and upper & lower extremities moving well with symmetry  Psychiatric: Calm, normal eye contact and normal affect       Primary Care Physician   Abril Martin    Discharge Orders      Adult GI  Referral - Consult Only      Reason for your hospital stay    Endometrial cancer with concern for disease progression  Severe acute blood loss anemia  Vaginal bleeding  Bilateral pulmonary nodules     Activity    Your activity upon discharge: activity as tolerated     Diet    Follow this diet upon discharge: Current Diet:Orders Placed This Encounter      NPO for Procedure/Surgery per Anesthesia Guidelines Except for: Meds; Clear liquids before procedure/surgery: ADULT (Age GREATER than or Equal to 18 years) - Clear liquids 2 hours before procedure/surgery     Hospital Follow-up with Existing Primary Care Provider (PCP)    Follow-up  pending pelvic MRI report  Follow-up pending lung biopsy results  Need for outpatient labs and surgical follow-up  Robotic laparoscopic hysterectomy with bilateral salpingo-oophorectomy as planned on 7/15/2025  Patient advised to return to the ER in the event of bleeding or worsening symptoms or severe anemia to next OB/GYN evaluation.  Monitor LFTs  Outpatient follow-up for further management of chronic hepatitis at the GI clinic as arranged.       Significant Results and Procedures   Most Recent 3 CBC's:  Recent Labs   Lab Test 07/11/25  0657 07/10/25  0601 07/09/25  2342   WBC 13.4* 10.0 10.8   HGB 9.3* 7.5* 7.7*   MCV 77* 75* 75*    293 285     Most Recent 3 BMP's:  Recent Labs   Lab Test 07/11/25  1325 07/11/25  0722 07/11/25  0657 07/11/25  0157 07/10/25  0855 07/10/25  0601 07/10/25  0110 07/09/25  1931 07/09/25  1528 07/09/25  1504 06/04/24  1605   NA  --   --   --   --   --  136  --   --   --  134* 139   POTASSIUM  --   --  4.0  --   --  4.3  --  4.2  --  4.3 4.5   CHLORIDE  --   --   --   --   --  105  --   --   --  103 105   CO2  --   --   --   --   --  25  --   --   --  18* 23   BUN  --   --   --   --   --  11.3  --   --   --  16.5 17.1   CR  --   --   --   --   --  0.60  --   --  0.8 0.80 0.55   ANIONGAP  --   --   --   --   --  6*  --   --   --  13 11   GABRIELA  --   --   --   --   --  9.3  --   --   --  9.6 10.2*   * 216*  --  250*   < > 232*   < >  --   --  253* 101*    < > = values in this interval not displayed.   ,   Results for orders placed or performed during the hospital encounter of 07/09/25   CTA Abdomen Pelvis with Contrast    Narrative    EXAM: CTA ABDOMEN PELVIS WITH CONTRAST  LOCATION: [facility]  DATE: 7/9/2025    INDICATION: vaginal bleeding evaluate for active hemorrhage  COMPARISON: 5/30/2025.  TECHNIQUE: CT angiogram abdomen pelvis during arterial and venous phase of injection of IV contrast. 2D and 3D MIP reconstructions were performed by the CT  technologist. Dose reduction techniques were used.  CONTRAST: 90ml isovue 370 from a single use vial    FINDINGS:  ANGIOGRAM ABDOMEN/PELVIS: The abdominal aorta, visceral arteries, and visualized iliac arteries are widely patent. Visualized lower extremity arteries are widely patent and unremarkable.    LOWER CHEST: Mild cardiomegaly. 5 mm nodule in the anterior right lung base. Respiratory motion artifact. Scattered atelectasis. A 0.8 x 0.9 cm mass in the lingula. No pleural effusion or pneumothorax.    HEPATOBILIARY: Normal.    PANCREAS: Normal.    SPLEEN: Normal.    ADRENAL GLANDS: Normal.    KIDNEYS/BLADDER: Normal.    BOWEL: Normal.    LYMPH NODES: Normal.    PELVIC ORGANS: IUD in appropriate position. Fluid within the endometrial canal and within the proximal vagina, likely representing blood clots. No active extravasation.    MUSCULOSKELETAL: Normal.      Impression    IMPRESSION:  1.  Fluid within the endometrial canal and proximal vaginal canal likely representing blood clots. No active bleeding identified.  2.  2 visualized pulmonary nodules, largest measuring 0.8 x 0.9 cm within the lingula. Recommend CT of the chest without contrast for further evaluation.   CT Chest w/o Contrast    Narrative    EXAM: CT CHEST W/O CONTRAST  LOCATION: [facility]  DATE: 7/10/2025    INDICATION: Pulmonary Nodules  COMPARISON: CT abdomen/pelvis from 7/9/2025.  TECHNIQUE: CT chest without IV contrast. Multiplanar reformats were obtained. Dose reduction techniques were used.  CONTRAST: None.    FINDINGS:   LUNGS AND PLEURA: Bandlike atelectasis in the right lower lobe. Again demonstrated are 2 adjacent nodules in the inferior segment of the lingula, one of which measures 9 mm on image 186 of series 4 and the second of which measures 7 mm on image 194.   There is also right-sided nodules including a 10 mm nodule in the anterior, medial aspect of the right middle lobe on image 131 of series a 5 mm nodule  in the inferior, lateral segment of the right middle lobe on image 151. No pneumothorax or pleural   effusion.    MEDIASTINUM/AXILLAE: Cardiac size at upper limits normal. No pericardial effusion.    CORONARY ARTERY CALCIFICATION: None.    UPPER ABDOMEN: No acute findings.    MUSCULOSKELETAL: Normal.      Impression    IMPRESSION:   1.  Bilateral pulmonary nodules measuring up to 10 mm, of indeterminate etiology or significance in absence of comparison thoracic imaging. Recommend pulmonology consultation for further management and follow-up.     MR Pelvis (GYN) wo & w Contrast    Narrative    EXAM: MR PELVIS (GYN) W/O and W CONTRAST  LOCATION: [facility]  DATE: 7/11/2025    INDICATION: endometrial cancer, worsening bleeding compare to 5 30 MR  COMPARISON: MR pelvis 05/30/2025  TECHNIQUE: Routine MRI female pelvis protocol including T1 in/out phase, diffusion, thin section high resolution multiplane T2, and post contrast T1.  CONTRAST: 11ml gadavist    FINDINGS:     UTERUS: There has been interval decrease in size of the mildly T2 hyperintense, hypoenhancing mass involving the lower uterine segment and cervix, which measures approximately 6 x 4.7 x 4.9 cm, previously measured 6.6 x 4.5 x 5.2 cm. The extent of this   mass is similar to the prior examination. This mass does not extend into the upper vagina. Similar nodular thickening in the fundal and mid uterine body endometrial canal with corresponding diffusion restriction and  hypoenhancement. Intrauterine device appears in functional position.    There is similar appearance of the infiltrating soft tissue extending into the left parametrium with possible contact of the left ovary (for example image 24, series 2 ill-defined right parametrial tissue is seen on image 22, series 2), suspicious for   right parametrial involvement.    Similar subcentimeter in short axis pelvic sidewall lymph nodes bilaterally measuring up to 7 mm on the right  (coronal image 22, series 9) and 6 mm on the left.    ADNEXA: The ovaries are normal in size. Trace pelvic free fluid. Previously seen right ovarian cyst has resolved. Small follicles are seen in the ovaries bilaterally.    ADDITIONAL FINDINGS: No suspicious osseous lesion. No obstruction of the imaged bowel.      Impression    IMPRESSION:  1.  Slight interval decrease in the size of the infiltrative mass involving the inferior uterus and cervix since the prior examination 05/30/2025, consistent with the patient's known endometrial adenocarcinoma and probable treatment effect. Similar   parametrial involvement on the left, which abuts the left ovary. There is also parametrial involvement on the right. Similar appearance of indeterminate pelvic sidewall lymph nodes bilaterally, suspicious for christine disease and probable IIIc staging.  2.  Trace pelvic free fluid.   US Biopsy Lung/Mediastinum    Narrative    EXAM:  1. PERCUTANEOUS BIOPSY RIGHT CHEST/PLEURA/LUNG  2. ULTRASOUND GUIDANCE  LOCATION: [facility]  DATE: 7/11/2025    INDICATION: Endometrial carcinoma, Rt internal mammary LN vs subpleural RML nodule    PROCEDURE: Informed consent obtained. Site marked. Prior images reviewed. Required items made available. Patient identity was confirmed verbally and with arm band. Patient reevaluated immediately before beginning the procedure. Universal protocol was   followed. Time out performed. The site was prepped and draped in sterile fashion. 50 mcg IV fentanyl administered for pain management. 5 mL of 1 percent lidocaine was infused into the local soft tissues. Using standard technique and under direct   ultrasound guidance, a 25-gauge biopsy needle was used to obtained to fine needle samples. However, the needle tip was likely not in the nodule (the needle tip could not be seen deep to the cartilage). Tissue was submitted to Pathology.     The patient tolerated the procedure well. No  complications.      Impression    IMPRESSION:  Status post US-guided biopsy of the right chest/pleural/lung nodule.    Reference CPT Code: 28327, 17042   XR Chest Port 1 View    Narrative    EXAM: XR CHEST PORT 1 VIEW  LOCATION: [facility]  DATE: 7/11/2025    INDICATION: Chest Biopsy right middle lobe nodule.  COMPARISON: None.      Impression    IMPRESSION:     No pneumothorax. No pleural effusion.    No airspace opacities or focal lung volume loss.    Unchanged heart and mediastinal interfaces.    Bone mineralization is normal.       Discharge Medications      Review of your medicines        CONTINUE these medicines which have NOT CHANGED        Dose / Directions   Accu-Chek Guide test strip  Used for: Encounter for medication refill, Type 2 diabetes mellitus without complication, without long-term current use of insulin (H)  Generic drug: blood glucose      Use to test blood sugar ONCE daily.  Quantity: 100 strip  Refills: 1     acetaminophen 500 MG tablet  Commonly known as: TYLENOL  Used for: Chronic low back pain with sciatica, sciatica laterality unspecified, unspecified back pain laterality      Dose: 1,000 mg  Take 2 tablets (1,000 mg) by mouth every 6 hours as needed for mild pain.  Quantity: 200 tablet  Refills: 1     albuterol 108 (90 Base) MCG/ACT inhaler  Commonly known as: PROAIR HFA/PROVENTIL HFA/VENTOLIN HFA  Used for: Wheezing      Dose: 2 puff  Inhale 2 puffs into the lungs every 4 hours as needed  Quantity: 18 g  Refills: 0     alcohol swab prep pads  Used for: Type 2 diabetes mellitus  (H)      Use to swab area of injection/nishant as directed.  Quantity: 100 each  Refills: 11     blood glucose lancets standard  Commonly known as: NO BRAND SPECIFIED  Used for: Type 2 diabetes mellitus  (H)      Use to test blood sugar 1 times daily or as directed.  Quantity: 100 each  Refills: 1     cetirizine 10 MG tablet  Commonly known as: zyrTEC  Used for: Allergy, initial encounter       Dose: 10 mg  Take 1 tablet (10 mg) by mouth daily  Quantity: 90 tablet  Refills: 4     famotidine 20 MG tablet  Commonly known as: PEPCID  Used for: Gastroesophageal reflux disease, unspecified whether esophagitis present      Dose: 20 mg  Take 1 tablet (20 mg) by mouth 2 times daily.  Quantity: 180 tablet  Refills: 0     ferrous gluconate 324 (38 Fe) MG tablet  Commonly known as: FERGON  Used for: Iron deficiency anemia due to chronic blood loss      Dose: 324 mg  Take 1 tablet (324 mg) by mouth every other day  Quantity: 100 tablet  Refills: 1     FLUoxetine 40 MG capsule  Commonly known as: PROzac  Used for: Major depressive disorder, recurrent, in full remission      Dose: 40 mg  Take 1 capsule (40 mg) by mouth daily.  Quantity: 90 capsule  Refills: 0     fluticasone 50 MCG/ACT nasal spray  Commonly known as: FLONASE  Used for: Seasonal allergic rhinitis, unspecified trigger      Dose: 2 spray  Spray 2 sprays into both nostrils daily  Quantity: 18.2 g  Refills: 4     FreeStyle Richard 3 Sensor Misc  Used for: Type 2 diabetes mellitus  (H)      Dose: 1 each  1 each every 14 days. Use 1 sensor every 14 days. Use to read blood sugars per 's instructions.  Quantity: 6 each  Refills: 5     metFORMIN 500 MG 24 hr tablet  Commonly known as: GLUCOPHAGE XR  Used for: Type 2 diabetes mellitus with microalbuminuria, without long-term current use of insulin (H)      Dose: 500 mg  Take 1 tablet (500 mg) by mouth daily (with dinner).  Quantity: 90 tablet  Refills: 0     omeprazole 20 MG DR capsule  Commonly known as: PriLOSEC  Used for: Gastroesophageal reflux disease with esophagitis without hemorrhage      Dose: 20 mg  Take 1 capsule (20 mg) by mouth daily.  Quantity: 90 capsule  Refills: 0     Sharps Container Misc  Used for: Type 2 diabetes mellitus  (H)      Dose: 1 each  1 each as needed (for auto-injector disposal).  Quantity: 1 each  Refills: 1     tirzepatide 10 MG/0.5ML Soaj auto-injector pen  Commonly  known as: MOUNJARO  Used for: Type 2 diabetes mellitus with microalbuminuria, without long-term current use of insulin (H)      Dose: 10 mg  Inject 0.5 mLs (10 mg) subcutaneously every 7 days.  Quantity: 2 mL  Refills: 2     traZODone 100 MG tablet  Commonly known as: DESYREL  Used for: Major depressive disorder, recurrent, in full remission      Dose: 100 mg  Take 1 tablet (100 mg) by mouth at bedtime.  Quantity: 90 tablet  Refills: 4     vitamin C 500 MG tablet  Commonly known as: ASCORBIC ACID      Dose: 1,000 mg  Take 1,000 mg by mouth  Refills: 0     vitamin D3 50 mcg (2000 units) tablet  Commonly known as: CHOLECALCIFEROL  Used for: Vitamin D deficiency      Dose: 2 tablet  Take 2 tablets (100 mcg) by mouth daily.  Quantity: 60 tablet  Refills: 5            Allergies   Allergies   Allergen Reactions    Zoloft      Sharp pains in arms

## 2025-07-11 NOTE — PRE-PROCEDURE
GENERAL PRE-PROCEDURE:   Procedure:  Imaging guided chest nodule core biopsy or FNA with possible chest tube placement.  Date/Time:  7/11/2025 10:25 AM    Verbal consent obtained?: Yes    Written consent obtained?: Yes    Risks and benefits: Risks, benefits and alternatives were discussed    DC Plan: Appropriate discharge home plan in place for patients who are going home after procedure   Consent given by:  Patient  Patient states understanding of procedure being performed: Yes    Patient's understanding of procedure matches consent: Yes    Procedure consent matches procedure scheduled: Yes    Expected level of sedation:  Moderate  Appropriately NPO:  Yes  ASA Class:  2  Mallampati  :  Grade 2- soft palate, base of uvula, tonsillar pillars, and portion of posterior pharyngeal wall visible  Lungs:  Lungs clear with good breath sounds bilaterally  Heart:  Normal heart sounds and rate  History & Physical reviewed:  History and physical reviewed and no updates needed  Statement of review:  I have reviewed the lab findings, diagnostic data, medications, and the plan for sedation

## 2025-07-11 NOTE — PLAN OF CARE
"  Problem: Adult Inpatient Plan of Care  Goal: Optimal Comfort and Wellbeing  Outcome: Progressing     Problem: Comorbidity Management  Goal: Blood Glucose Levels Within Targeted Range  Outcome: Progressing  Intervention: Monitor and Manage Glycemia  Recent Flowsheet Documentation  Taken 7/11/2025 0300 by Soledad Gardiner RN  Medication Review/Management: medications reviewed  Taken 7/10/2025 2048 by Soledad Gardiner RN  Medication Review/Management: medications reviewed     Problem: Anemia  Goal: Anemia Symptom Improvement  Outcome: Progressing  Intervention: Monitor and Manage Anemia  Recent Flowsheet Documentation  Taken 7/11/2025 0300 by Soledad Gardiner RN  Safety Promotion/Fall Prevention:   activity supervised   assistive device/personal items within reach   clutter free environment maintained   nonskid shoes/slippers when out of bed  Taken 7/10/2025 2048 by Soledad Gardiner RN  Safety Promotion/Fall Prevention:   activity supervised   assistive device/personal items within reach   clutter free environment maintained   nonskid shoes/slippers when out of bed     Problem: Pain Acute  Goal: Optimal Pain Control and Function  Outcome: Progressing  Intervention: Prevent or Manage Pain  Recent Flowsheet Documentation  Taken 7/11/2025 0300 by Soledad Gardiner RN  Medication Review/Management: medications reviewed  Taken 7/10/2025 2048 by Soledad Gardiner RN  Medication Review/Management: medications reviewed   Goal Outcome Evaluation:       Pt is A/O x4. Denies pain. Both IV removed per pt request. '' Stating that if we do not removed the IV she will pull it off \". Pt continue to have vaginal bleed.  and 254. VSS, continue to monitor. Soledad Gardiner RN                       "

## 2025-07-11 NOTE — PROGRESS NOTES
[facility]    Medicine Progress Note - Hospitalist Service    Date of Admission:  7/9/2025    Assessment & Plan   Monica Doshi is a 39-year-old female with history of endometrial cancer, asthma, and GERD admitted on 7/9/2025 with severe acute blood loss anemia secondary to worsening vaginal bleeding in the setting of progressive endometrial cancer.     She was transfused with 3 units of PRBC. CT angiogram of the abdomen and pelvis performed on 7/9/2025 showed fluid within the endometrial canal and proximal vaginal canal likely representing blood clots and 2 visualized pulmonary nodules, largest measuring 0.8 x 0.9 cm within the lingula. Report of pelvic MRI performed 7/11/2025 is pending.     Chest CT performed on 7/10/2025 showed bilateral pulmonary nodules measuring up to 10 mm pulmonologist recommends lung biopsy.  Patient was evaluated by pulmonologist who recommended lung biopsy which was subsequently performed by IR on 7/11/2025.  Report of US guided lung biopsy performed on 7/11/2025 is pending. Hepatitis panel ordered during hospital stay due to reports of chronic hepatitis returned positive for hepatitis B surface antigen.  She will follow-up at the GI clinic for further management of chronic hepatitis.       On 7/11/2025, she opted to leave the hospital against medical advice in order to address personal issues, including packing belongings in anticipation of an impending eviction from her current residence.  Patient was advised to remain in the hospital due to ongoing vaginal bleed and risk of severe anemia which might require further blood transfusion, however, she patient declined. OB/GYN oncology service has arranged for a repeat laboratory testing including hemoglobin check on 7/14/2025.  She is also scheduled to return for robotic-assisted total laparoscopic hysterectomy with bilateral salpingo-oophorectomy and sentinel lymph node biopsy on 7/15/2025.       #Acute Blood Loss  "Anemia  #Endometrial Cancer  CTA A/P showed fluid within the endometrial canal and proximal vaginal canal likely representing blood clots. She was transfused with 2 units PRBCs in the ED. Ordered additional 1 unit of PRBC.  -Pelvic [id number] report  -Surgery tentatively scheduled for 7/15/2025 by OB/GYN  - OB/GYN [id number] assistance    #Type 2 Diabetes  - Last A1c was 6.5% in 05/2025  - Home medications include metformin, tirzepatide  - Hold home metformin, tirzepatide  - Sliding Scale Insulin with Glucose Checks    Concern for chronic hepatitis  -Follow-up hepatitis panel  -Monitor LFT  Will consider GI/hepatology evaluation depending on results    #Pulmonary Nodules  -Unclear whether this is secondary to metastasis from endometrial cancer  -Incidental finding on CT Abd in the ED  -IR biopsy-performed on 7/11/2025; follow-up biopsy report    #Mood Disorder - Continue PTA Fluoxetine  #Asthma - Continue PTA Inhaler  #GERD - Continue PTA Famotidine/Omeprazole      Diet: Diet  NPO for Procedure/Surgery per Anesthesia Guidelines Except for: Meds; Clear liquids before procedure/surgery: ADULT (Age GREATER than or Equal to 18 years) - Clear liquids 2 hours before procedure/surgery  Regular Diet Adult    DVT Prophylaxis: Pneumatic Compression Devices  Navarro Catheter: Not present  Lines: None     Cardiac Monitoring: None  Code Status: Full Code      Clinically Significant Risk Factors                                # Morbid Obesity: Estimated body mass index is 42.91 kg/m  as calculated from the following:    Height as of this encounter: 1.626 m (5' 4\").    Weight as of this encounter: 113.4 kg (250 lb)., PRESENT ON ADMISSION     # Financial/Environmental Concerns: eviction pending (pt states she is behind on rent)  # Asthma: noted on problem list        Social Drivers of Health    Food Insecurity: High Risk (7/10/2025)    Food Insecurity     Within the past 12 months, did you worry that your food would run " out before you got money to buy more?: Yes     Within the past 12 months, did the food you bought just not last and you didn t have money to get more?: No   Housing Stability: High Risk (7/10/2025)    Housing Stability     Do you have housing? : Yes     Are you worried about losing your housing?: Yes   Tobacco Use: High Risk (1/31/2025)    Received from [facility]    Patient History     Smoking Tobacco Use: Every Day     Smokeless Tobacco Use: Never     Passive Exposure: Never   Social Connections: Unknown (5/10/2024)    Social Connection and Isolation Panel [NHANES]     Frequency of Social Gatherings with Friends and Family: Once a week          Disposition Plan     Medically Ready for Discharge: Anticipated in 2-4 Days      Jannette Macias MD  Hospitalist Service  [facility]  Securely message with [employer] (more info)  Text page via [facility] Paging/Directory   ______________________________________________________________________    Interval History   No new complaints today and no acute events overnight.  Patient feels much better after transfusion yesterday.  Patient requests to be discharged AGAINST MEDICAL ADVICE in order to pack her belongings in anticipation for impending eviction.    Patient later changed her mind and decided to stay in the hospital for the night due to chest discomfort from lung biopsy.  Physical Exam   Vital Signs: Temp: 98  F (36.7  C) Temp src: Oral BP: 139/83 Pulse: 73   Resp: 18 SpO2: 100 % O2 Device: None (Room air)    Weight: 250 lbs 0 oz    General appearance: Obese, pale, awake, Alert, Cooperative, not in any obvious distress and appears stated age   HEENT: Normocephalic, atraumatic, conjunctiva clear without icterus and ears without discharge  Lungs: Clear to auscultation bilaterally, no wheezing, good air exchange, normal work of breathing  Cardiovascular: Regular Rate and Rythm, normal apical impulse, normal S1 and S2, no lower extremity edema  bilaterally  Abdomen: Soft, non-tender and Non-distended, active bowel sounds  Skin: Skin color, texture normal and bruising or bleeding. No rashes or lesions over face, neck, arms and legs, turgor normal.  Musculoskeletal: No bony deformities or joint tenderness. Normal ROM upon flexion & extension.   Neurologic: Alert & Oriented X 3, Facial symmetry preserved and upper & lower extremities moving well with symmetry  Psychiatric: Calm, normal eye contact and normal affect      Medical Decision Making       55 MINUTES SPENT BY ME on the date of service doing chart review, history, exam, documentation & further activities per the note.      Data

## 2025-07-11 NOTE — SEDATION DOCUMENTATION
Patient Name: Monica Doshi  Medical Record Number: [id number]  Today's Date: 7/11/2025    Procedure: Lymph node/nodule biopsy  Proceduralist: Dr. Fahrner    Procedure Start: 1207  Procedure end: 1239  Sedation medications administered: 0 mg midazolam and 50 mcg fentanyl   Sedation time: 0 minutes Fentanyl only    Report given to: Primary nurse at bedside upon return to room 217 after chect xray on the way back up to room      Other Notes: Pt arrived to U/s room 4 from Pre/post bay 2. Consent reviewed. Pt denies any questions or concerns regarding procedure. Pt positioned Supine and monitored per protocol. Pt tolerated procedure without any noted complications. VSS on Transfer. Pt transferred back to Chest xray then P2 room 217.

## 2025-07-11 NOTE — PROGRESS NOTES
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 She is s/p Mirena IUD insertion 8/20/24 and thereafter failed to follow up. She has now been seen in an urgent, acute fashion with heavy vaginal bleeding and severe anemia on multiple occasions with evidence for progressive disease. She was recommended surgical management in May, 2025 as an outpatient and failed to follow through with surgical planning.     Subjective:   Bleeding is steady, however, not as heavy as the last two weeks. Monica states she desires to go home. She is set to be evicted and needs to get herself moved out of her house and into her cousin's house. She states she does have [employer] insurance. She no longer wants to spend the weekend in the hospital and needs to go home to move. She is very animated with her discussion.    EXAM:  Intake/Output Summary (Last 24 hours) at 7/11/2025 1051  Last data filed at 7/10/2025 2050  Gross per 24 hour   Intake 870 ml   Output --   Net 870 ml        Vitals: BP (!) 150/80   Pulse 85   Temp 98.4  F (36.9  C) (Oral)   Resp 20   Ht 1.626 m (5' 4\")   Wt 113.4 kg (250 lb)   SpO2 96%   BMI 42.91 kg/m    BMI= Body mass index is 42.91 kg/m .     GENERAL: alert, NAD; obese - mildly pale  CV: RRR, no murmurs  RESPIRATORY: no dyspnea, clear bilat  ABDOMEN: soft, non-tender, no r/g  EXTREMITY: no edema, neg Tree's  SKIN: warm and dry, no jaundice no rashes  NEURO: cranial nerves II-XII grossly intact, motor exam intact    LABS  Recent Labs   Lab Test 07/11/25  0657 07/10/25  0601   WBC 13.4* 10.0   RBC 3.87 3.26*   HGB 9.3* 7.5*   HCT 29.6* 24.3*   MCV 77* 75*   MCH 24.0* 23.0* "   MCHC 31.4* 30.9*    293     Recent Labs   Lab Test 07/11/25  0657 07/10/25  0601 07/09/25  1931 07/09/25  1504   POTASSIUM 4.0 4.3   < > 4.3   CHLORIDE  --  105  --  103   BUN  --  11.3  --  16.5    < > = values in this interval not displayed.      Recent Labs   Lab Test 07/10/25  0601 02/22/24  1625 10/07/22  0907   PROTEIN  --   --  30*   BILITOTAL 0.7 0.4  --    AST 10 18  --    ALT 10 27  --        ASSESSMENT  39 year old female with endometrial cancer, diagnosed back in May 2024. Mirena IUD placed and patient was lost to follow up due to lapse in insurance.  Recently seen as outpatient with imaging concerning for disease progression. Now admitted for profound anemia from worsening vaginal bleeding. Bilateral pulmonary nodules idenitifed on chest imaging.     Endometrial cancer:   -concern for progression on 5/30/25 MRI scan, discussion for surgical intervention at that time. Rolalisa failed to follow through with surgical planning despite my recommendations.   -Will repeat MRI pelvis to ensure no significant changes (avoid a situation where neoadjuvant therapy would be necessary).  -Discussed with patient that given significant bleeding would like to proceed with surgical intervention during hospitalization. Tentatively scheduled for next Tuesday.   -Hgb at least between 8-9 in preparation for surgery  -While Monica stated she previously understood the need to stay in house for observation prior to her surgery Tuesday, she is now insisting on discharge. Despite our pleas to stay and ensure her surgery is completed, she is insisting on leaving. We discussed that if she leaves, we will get an outpatient CBC, CMP and Follow-up for surgery as an outpatient Tuesday. We discussed our strong concerns that she could start hemorrhaging again as an outpatient and my additional great fear is that she will not return. Our plan at this point is for an RATLH/BSO/SLN Bx Tuesday 7/15/25.      Pulmonary nodule  -Bilateral  pulmonary nodules noted on CT chest, largest size 10mm  -Discussed with Dr Sen from pulmonology. Case reviewed with IR and amenable for biopsy  -Plan to schedule this for today  -Unlikely to alter course of therapy as patient is bleeding very heavily and needs the primary bleeding tumor removed.      DM  -blood sugar management per medicine team, appreciate assistance     Hepatitis B  -Patient reports history of hepatitis B but admits to improper follow up  -Liver labs wnl  -Notified medicine team     Mary Ellen Isbell MD  Gynecologic Oncology  [facility]  [facility]: [phone]

## 2025-07-11 NOTE — PROGRESS NOTES
Care Management Discharge Note    Discharge Date: 07/11/2025       Discharge Disposition:  return to current apartment    Discharge Services:  no skilled services     Discharge DME:  NA    Discharge Transportation: Family or friend      Private pay costs discussed: Not applicable    Education Provided on the Discharge Plan:    Persons Notified of Discharge Plans: patient   Patient/Family in Agreement with the Plan:      Handoff Referral Completed: No, handoff not indicated or clinically appropriate    Additional Information:  Patient has discharge orders complete.   SW met with patient today to review additional support and resources prior to discharge. SW provided printed information on  services and housing  per pt request.   Provided information on applying for cash benefits with [facility]. Pt states understanding and denies additional needs at this time.   She has family and friends who will be supporting her through this time.     Ella Carolina, MALGORZATASW

## 2025-07-11 NOTE — PROGRESS NOTES
PULMONARY PROGRESS NOTE    Date / Time of Admission:  7/9/2025  2:55 PM  Assessment:   39yoF with known endometrial cancer based on biopsy from 6/2024 now with several lung nodules 1cm and smaller concerning for metastasis.     Active Problems:    Vaginal bleeding    Anemia due to blood loss, acute      Plan:   Biopsy was done today with convincing but unfortunately not certain that adequate tissue was obtained. This has been rescheduled for Monday but patient is expressing the desire to leave and I fear she will not follow up.   Encouraged her to pursue a diagnosis so that she can make plans for treatment. Reiterated how sick she was from all the bleeding on admission. She no longer wants to wait in house until Tuesday.   Pulmonary will sign off.       Subjective:   HPI:  Monica Doshi is a 39 year old female with history of obesity, daily cigarette/marijuana smoking and known endometrial cancer by biopsy 6/2024 here with severe vaginal hemorrhage from the endometrial cancer.     CTA performed due to bleeding but caught the bases of her lungs. CT chest then performed that found 1cm and smaller nodules in the lungs.      She does report smoking cigarettes and marijuana and vaping daily.   She seemed to have a change in behavior this morning, wanting to leave, explaining she has significant issues at home and needs to get out of here, but this afternoon is now expressing the desire to stay overnight at least.         Allergies:   Allergies   Allergen Reactions    Zoloft      Sharp pains in arms        MEDS:  Scheduled Meds:  Current Facility-Administered Medications   Medication Dose Route Frequency Provider Last Rate Last Admin    cetirizine (zyrTEC) tablet 10 mg  10 mg Oral Daily Jono Julio MD   10 mg at 07/11/25 0808    famotidine (PEPCID) tablet 20 mg  20 mg Oral BID Jono Julio MD   20 mg at 07/11/25 0808    ferrous gluconate (FERGON) tablet 324 mg  324 mg Oral Every Other Day Jono Julio MD   324  mg at 07/10/25 0916    FLUoxetine (PROzac) capsule 40 mg  40 mg Oral Daily Jono Julio MD   40 mg at 07/11/25 0811    fluticasone (FLONASE) 50 MCG/ACT spray 2 spray  2 spray Both Nostrils Daily Jono Julio MD   2 spray at 07/10/25 1043    insulin aspart (NovoLOG) injection (RAPID ACTING)  1-7 Units Subcutaneous TID AC Jono Julio MD   3 Units at 07/11/25 1726    insulin aspart (NovoLOG) injection (RAPID ACTING)  1-5 Units Subcutaneous At Bedtime Jono Julio MD   2 Units at 07/10/25 2050    pantoprazole (PROTONIX) EC tablet 40 mg  40 mg Oral QAM AC Jono Julio MD   40 mg at 07/11/25 0808    sodium chloride (PF) 0.9% PF flush 3 mL  3 mL Intracatheter Q8H Jono Julio MD   3 mL at 07/11/25 1325    traZODone (DESYREL) tablet 100 mg  100 mg Oral At Bedtime Jono Julio MD   100 mg at 07/10/25 2050    vitamin C (ASCORBIC ACID) tablet 1,000 mg  1,000 mg Oral Daily Jono Julio MD   1,000 mg at 07/11/25 0808    Vitamin D3 (CHOLECALCIFEROL) tablet 100 mcg  100 mcg Oral Daily Jono Julio MD   100 mcg at 07/11/25 0808     Continuous Infusions:  Current Facility-Administered Medications   Medication Dose Route Frequency Provider Last Rate Last Admin     PRN Meds:.  Current Facility-Administered Medications   Medication Dose Route Frequency Provider Last Rate Last Admin    acetaminophen (TYLENOL) tablet 650 mg  650 mg Oral Q4H PRN Jono Julio MD        Or    acetaminophen (TYLENOL) Suppository 650 mg  650 mg Rectal Q4H PRN Jono Julio MD        albuterol (PROVENTIL HFA/VENTOLIN HFA) inhaler  2 puff Inhalation Q4H PRN Jono Julio MD        calcium carbonate (TUMS) chewable tablet 1,000 mg  1,000 mg Oral 4x Daily PRN Jono Julio MD        glucose gel 15-30 g  15-30 g Oral Q15 Min PRN Jono Julio MD        Or    dextrose 50 % injection 25-50 mL  25-50 mL Intravenous Q15 Min PRN Jono Julio MD        Or    glucagon injection 1 mg  1 mg  "Subcutaneous Q15 Min PRN Jono Julio MD        lidocaine (LMX4) cream   Topical Q1H PRN Jono Julio MD        lidocaine 1 % 0.1-1 mL  0.1-1 mL Other Q1H PRN Jono Julio MD        melatonin tablet 5 mg  5 mg Oral At Bedtime PRN Jono Julio MD        naloxone (NARCAN) injection 0.2 mg  0.2 mg Intravenous Q2 Min PRN Jono Julio MD        Or    naloxone (NARCAN) injection 0.4 mg  0.4 mg Intravenous Q2 Min PRN Jono Julio MD        Or    naloxone (NARCAN) injection 0.2 mg  0.2 mg Intramuscular Q2 Min PRN Jono Julio MD        Or    naloxone (NARCAN) injection 0.4 mg  0.4 mg Intramuscular Q2 Min PRN Jono Julio MD        ondansetron (ZOFRAN ODT) ODT tab 4 mg  4 mg Oral Q6H PRN Jono Julio MD        Or    ondansetron (ZOFRAN) injection 4 mg  4 mg Intravenous Q6H PRN Jono Julio MD   4 mg at 07/10/25 0808    oxyCODONE (ROXICODONE) tablet 5 mg  5 mg Oral Q4H PRN Jono Julio MD   5 mg at 07/10/25 0916    Or    oxyCODONE (ROXICODONE) tablet 10 mg  10 mg Oral Q4H PRN Jono Julio MD   10 mg at 07/11/25 1325    polyethylene glycol (MIRALAX) Packet 17 g  17 g Oral BID PRN Jono Julio MD        senna-docusate (SENOKOT-S/PERICOLACE) 8.6-50 MG per tablet 1 tablet  1 tablet Oral BID PRJono Mathew MD        Or    senna-docusate (SENOKOT-S/PERICOLACE) 8.6-50 MG per tablet 2 tablet  2 tablet Oral BID PRN Jono Julio MD        sodium chloride (PF) 0.9% PF flush 3 mL  3 mL Intracatheter q1 min prn Jono Julio MD   3 mL at 07/10/25 2057       Objective:   VITALS:  /83 (BP Location: Right arm)   Pulse 73   Temp 98  F (36.7  C) (Oral)   Resp 18   Ht 1.626 m (5' 4\")   Wt 113.4 kg (250 lb)   SpO2 100%   BMI 42.91 kg/m    EXAM:    GENERAL APPEARANCE: Alert, no acute distress  HENT: Oral mucosa and posterior oropharynx normal, moist mucous membranes  NECK: No adenopathy,masses or thyromegaly  RESP: lungs clear to auscultation " bilaterally  CV: regular rate and rhythm, no murmur, rub or gallop  ABDOMEN: normal bowel sounds, soft, nontender, no hepatosplenomegaly or other masses  LYMPHATICS: No cervical, or supraclavicular adenopathy  SKIN: no suspicious lesions or rashes  NEURO: Alert, oriented x 3, speech and mentation normal        I&O:        Data Review:    Imaging: personally reviewed  Chest CT  EXAM: CT CHEST W/O CONTRAST  LOCATION[facility]  DATE: 7/10/2025     INDICATION: Pulmonary Nodules  COMPARISON: CT abdomen/pelvis from 7/9/2025.  TECHNIQUE: CT chest without IV contrast. Multiplanar reformats were obtained. Dose reduction techniques were used.  CONTRAST: None.     FINDINGS:   LUNGS AND PLEURA: Bandlike atelectasis in the right lower lobe. Again demonstrated are 2 adjacent nodules in the inferior segment of the lingula, one of which measures 9 mm on image 186 of series 4 and the second of which measures 7 mm on image 194.   There is also right-sided nodules including a 10 mm nodule in the anterior, medial aspect of the right middle lobe on image 131 of series a 5 mm nodule in the inferior, lateral segment of the right middle lobe on image 151. No pneumothorax or pleural   effusion.     MEDIASTINUM/AXILLAE: Cardiac size at upper limits normal. No pericardial effusion.     CORONARY ARTERY CALCIFICATION: None.     UPPER ABDOMEN: No acute findings.     MUSCULOSKELETAL: Normal.                                                                      IMPRESSION:   1.  Bilateral pulmonary nodules measuring up to 10 mm, of indeterminate etiology or significance in absence of comparison thoracic imaging. Recommend pulmonology consultation for further management and follow-up.       Results for orders placed or performed during the hospital encounter of 07/09/25   Basic metabolic panel    Collection Time: 07/09/25  3:04 PM   Result Value Ref Range    Sodium 134 (L) 135 - 145 mmol/L    Potassium 4.3 3.4 - 5.3 mmol/L     Chloride 103 98 - 107 mmol/L    Carbon Dioxide (CO2) 18 (L) 22 - 29 mmol/L    Anion Gap 13 7 - 15 mmol/L    Urea Nitrogen 16.5 6.0 - 20.0 mg/dL    Creatinine 0.80 0.51 - 0.95 mg/dL    GFR Estimate >90 >60 mL/min/1.73m2    Calcium 9.6 8.8 - 10.4 mg/dL    Glucose 253 (H) 70 - 99 mg/dL     Lab Results   Component Value Date    WBC 13.4 (H) 07/11/2025    HGB 9.3 (L) 07/11/2025    HCT 29.6 (L) 07/11/2025    MCV 77 (L) 07/11/2025     07/11/2025

## 2025-07-12 LAB
GLUCOSE BLDC GLUCOMTR-MCNC: 154 MG/DL (ref 70–99)
GLUCOSE BLDC GLUCOMTR-MCNC: 175 MG/DL (ref 70–99)
GLUCOSE BLDC GLUCOMTR-MCNC: 198 MG/DL (ref 70–99)
GLUCOSE BLDC GLUCOMTR-MCNC: 201 MG/DL (ref 70–99)
GLUCOSE BLDC GLUCOMTR-MCNC: 212 MG/DL (ref 70–99)
HGB BLD-MCNC: 8.2 G/DL (ref 11.7–15.7)
HOLD SPECIMEN: NORMAL
HOLD SPECIMEN: NORMAL
MAGNESIUM SERPL-MCNC: 2.1 MG/DL (ref 1.7–2.3)
MCV RBC AUTO: 77 FL (ref 78–100)
PHOSPHATE SERPL-MCNC: 4 MG/DL (ref 2.5–4.5)
POTASSIUM SERPL-SCNC: 4.3 MMOL/L (ref 3.4–5.3)

## 2025-07-12 PROCEDURE — 83735 ASSAY OF MAGNESIUM: CPT | Performed by: INTERNAL MEDICINE

## 2025-07-12 PROCEDURE — 84132 ASSAY OF SERUM POTASSIUM: CPT | Performed by: INTERNAL MEDICINE

## 2025-07-12 PROCEDURE — 99232 SBSQ HOSP IP/OBS MODERATE 35: CPT | Performed by: INTERNAL MEDICINE

## 2025-07-12 PROCEDURE — 250N000013 HC RX MED GY IP 250 OP 250 PS 637: Performed by: STUDENT IN AN ORGANIZED HEALTH CARE EDUCATION/TRAINING PROGRAM

## 2025-07-12 PROCEDURE — 36415 COLL VENOUS BLD VENIPUNCTURE: CPT | Performed by: INTERNAL MEDICINE

## 2025-07-12 PROCEDURE — 120N000001 HC R&B MED SURG/OB

## 2025-07-12 PROCEDURE — 85018 HEMOGLOBIN: CPT | Performed by: INTERNAL MEDICINE

## 2025-07-12 PROCEDURE — 84100 ASSAY OF PHOSPHORUS: CPT | Performed by: INTERNAL MEDICINE

## 2025-07-12 RX ADMIN — FERROUS GLUCONATE 324 MG: 324 TABLET ORAL at 08:41

## 2025-07-12 RX ADMIN — FAMOTIDINE 20 MG: 20 TABLET ORAL at 08:40

## 2025-07-12 RX ADMIN — Medication 1000 MG: at 08:40

## 2025-07-12 RX ADMIN — INSULIN ASPART 1 UNITS: 100 INJECTION, SOLUTION INTRAVENOUS; SUBCUTANEOUS at 08:44

## 2025-07-12 RX ADMIN — Medication 100 MCG: at 08:41

## 2025-07-12 RX ADMIN — FAMOTIDINE 20 MG: 20 TABLET ORAL at 21:59

## 2025-07-12 RX ADMIN — FLUOXETINE HYDROCHLORIDE 40 MG: 20 CAPSULE ORAL at 08:41

## 2025-07-12 RX ADMIN — PANTOPRAZOLE SODIUM 40 MG: 40 TABLET, DELAYED RELEASE ORAL at 08:41

## 2025-07-12 RX ADMIN — INSULIN ASPART 2 UNITS: 100 INJECTION, SOLUTION INTRAVENOUS; SUBCUTANEOUS at 16:53

## 2025-07-12 RX ADMIN — POLYETHYLENE GLYCOL 3350 17 G: 17 POWDER, FOR SOLUTION ORAL at 17:43

## 2025-07-12 RX ADMIN — CETIRIZINE HYDROCHLORIDE 10 MG: 10 TABLET, FILM COATED ORAL at 08:40

## 2025-07-12 RX ADMIN — INSULIN ASPART 2 UNITS: 100 INJECTION, SOLUTION INTRAVENOUS; SUBCUTANEOUS at 13:20

## 2025-07-12 ASSESSMENT — ACTIVITIES OF DAILY LIVING (ADL)
ADLS_ACUITY_SCORE: 41

## 2025-07-12 NOTE — PROGRESS NOTES
Care Management Follow Up    Length of Stay (days): 3    Expected Discharge Date: 07/11/2025    Anticipated Discharge Plan: Home    Transportation: Anticipate Family/friend    PT Recommendations:    OT Recommendations:        Barriers to Discharge: medical stability, diagnostic workup    Prior Living Situation: apartment with alone    Discussed  Partnership in Safe Discharge Planning  document with patient/family: No     Handoff Completed: No, handoff not indicated or clinically appropriate    Patient/Spokesperson Updated: No    Additional Information:  RNCM Chart review, patient independent in room, alert and oriented x4, no new skilled nursing needs identified. Resources previously provided for information on housing, evictions related to [place] State laws and applying for [facility] resources. Anticipate discharge home self care.     Next Steps: No further care management intervention anticipated at this time.  Please re-consult if further needs arise. Care management signing off.      Mary Ellen Jones RN  Acute Care Management  [facility]  For further questions/concerns you can reach us at Vocera Web Console

## 2025-07-12 NOTE — PROGRESS NOTES
[facility]    Medicine Progress Note - Hospitalist Service    Date of Admission:  7/9/2025    Assessment & Plan   Monica Doshi is a 39-year-old female with history of endometrial cancer, asthma, and GERD admitted on 7/9/2025 with severe acute blood loss anemia secondary to worsening vaginal bleeding in the setting of progressive endometrial cancer.     She was transfused with 3 units of PRBC. CT angiogram of the abdomen and pelvis performed on 7/9/2025 showed fluid within the endometrial canal and proximal vaginal canal likely representing blood clots and 2 visualized pulmonary nodules, largest measuring 0.8 x 0.9 cm within the lingula. Report of pelvic MRI performed 7/11/2025 is pending.     Chest CT performed on 7/10/2025 showed bilateral pulmonary nodules measuring up to 10 mm pulmonologist recommends lung biopsy.  Patient was evaluated by pulmonologist who recommended lung biopsy which was subsequently performed by IR on 7/11/2025.  Report of US guided lung biopsy performed on 7/11/2025 is pending. Hepatitis panel ordered during hospital stay due to reports of chronic hepatitis returned positive for hepatitis B surface antigen.  She will follow-up at the GI clinic for further management of chronic hepatitis.       On 7/11/2025, she opted to leave the hospital against medical advice in order to address personal issues, including packing belongings in anticipation of an impending eviction from her current residence.  Patient was advised to remain in the hospital due to ongoing vaginal bleed and risk of severe anemia which might require further blood transfusion, however, she patient declined. OB/GYN oncology service has arranged for a repeat laboratory testing including hemoglobin check on 7/14/2025.  She is also scheduled to return for robotic-assisted total laparoscopic hysterectomy with bilateral salpingo-oophorectomy and sentinel lymph node biopsy on 7/15/2025.       #Acute Blood Loss  "Anemia  #Endometrial Cancer  CTA A/P showed fluid within the endometrial canal and proximal vaginal canal likely representing blood clots. She was transfused with 2 units PRBCs in the ED. Ordered additional 1 unit of PRBC.  -Pelvic MQG-lylajn-du report  -Surgery tentatively scheduled for 7/15/2025 by OB/GYN  - OB/GYN [id number] assistance    #Type 2 Diabetes  - Last A1c was 6.5% in 05/2025  - Home medications include metformin, tirzepatide  - Hold home metformin, tirzepatide  - Sliding Scale Insulin with Glucose Checks    Concern for chronic hepatitis  -Follow-up hepatitis panel  -Monitor LFT  Will consider GI/hepatology evaluation depending on results    #Pulmonary Nodules  -Unclear whether this is secondary to metastasis from endometrial cancer  -Incidental finding on CT Abd in the ED  -IR biopsy-performed on 7/11/2025; follow-up biopsy report    #Mood Disorder - Continue PTA Fluoxetine  #Asthma - Continue PTA Inhaler  #GERD - Continue PTA Famotidine/Omeprazole      Diet: Diet  NPO for Procedure/Surgery per Anesthesia Guidelines Except for: Meds; Clear liquids before procedure/surgery: ADULT (Age GREATER than or Equal to 18 years) - Clear liquids 2 hours before procedure/surgery  Regular Diet Adult    DVT Prophylaxis: Pneumatic Compression Devices  Navarro Catheter: Not present  Lines: None     Cardiac Monitoring: None  Code Status: Full Code      Clinically Significant Risk Factors                                # Morbid Obesity: Estimated body mass index is 42.91 kg/m  as calculated from the following:    Height as of this encounter: 1.626 m (5' 4\").    Weight as of this encounter: 113.4 kg (250 lb)., PRESENT ON ADMISSION     # Financial/Environmental Concerns: eviction pending (pt states she is behind on rent)  # Asthma: noted on problem list        Social Drivers of Health    Food Insecurity: High Risk (7/10/2025)    Food Insecurity     Within the past 12 months, did you worry that your food would run " out before you got money to buy more?: Yes     Within the past 12 months, did the food you bought just not last and you didn t have money to get more?: No   Housing Stability: High Risk (7/10/2025)    Housing Stability     Do you have housing? : Yes     Are you worried about losing your housing?: Yes   Tobacco Use: High Risk (1/31/2025)    Received from [facility]    Patient History     Smoking Tobacco Use: Every Day     Smokeless Tobacco Use: Never     Passive Exposure: Never   Social Connections: Unknown (5/10/2024)    Social Connection and Isolation Panel [NHANES]     Frequency of Social Gatherings with Friends and Family: Once a week          Disposition Plan     Medically Ready for Discharge: Anticipated in 2-4 Days      Jannette Macias MD  Hospitalist Service  [facility]  Securely message with [employer] (more info)  Text page via [facility] Paging/Directory   ______________________________________________________________________    Interval History   No new complaints today and no acute events overnight.  Hemoglobin dropped from 9.3 to 8.2.  She initially asked to be discharged against medical advice but later opted to continue to receive care in the hospital.    Physical Exam   Vital Signs: Temp: 98  F (36.7  C) Temp src: Oral BP: 130/70 Pulse: 77   Resp: 18 SpO2: 92 % O2 Device: None (Room air)    Weight: 250 lbs 0 oz    General appearance: Obese, pale, awake, Alert, Cooperative, not in any obvious distress and appears stated age   HEENT: Normocephalic, atraumatic, conjunctiva clear without icterus and ears without discharge  Lungs: Clear to auscultation bilaterally, no wheezing, good air exchange, normal work of breathing  Cardiovascular: Regular Rate and Rythm, normal apical impulse, normal S1 and S2, no lower extremity edema bilaterally  Abdomen: Soft, non-tender and Non-distended, active bowel sounds  Skin: Skin color, texture normal and bruising or bleeding. No rashes or lesions  over face, neck, arms and legs, turgor normal.  Musculoskeletal: No bony deformities or joint tenderness. Normal ROM upon flexion & extension.   Neurologic: Alert & Oriented X 3, Facial symmetry preserved and upper & lower extremities moving well with symmetry  Psychiatric: Calm, normal eye contact and normal affect      Medical Decision Making       55 MINUTES SPENT BY ME on the date of service doing chart review, history, exam, documentation & further activities per the note.      Data

## 2025-07-12 NOTE — PLAN OF CARE
Problem: Adult Inpatient Plan of Care  Goal: Absence of Hospital-Acquired Illness or Injury  Intervention: Identify and Manage Fall Risk  Recent Flowsheet Documentation  Taken 7/12/2025 1836 by Mignon Alberts RN  Safety Promotion/Fall Prevention:   clutter free environment maintained   lighting adjusted   nonskid shoes/slippers when out of bed   patient and family education   safety round/check completed     Problem: Comorbidity Management  Goal: Maintenance of Behavioral Health Symptom Control  Outcome: Progressing   Goal Outcome Evaluation:Pt alert and oriented x 4, denied pain, independent in room, pt wants to leave AMA, and go home, IV out. Pt later decided not to leave against Medical advice, pt requested for miralax for constipation, pt in room, uses dentures, Pt able to make her needs known.

## 2025-07-12 NOTE — PLAN OF CARE
Problem: Adult Inpatient Plan of Care  Goal: Optimal Comfort and Wellbeing  Outcome: Progressing   Goal Outcome Evaluation:       Pt denies pain. Awakenings minimized. BG at 0200 198. Independent in room.

## 2025-07-12 NOTE — PROGRESS NOTES
GYN oncology chart check:     It appears Monica may have stayed overnight. Her Hgb trend is downward slightly, now 8.3 g/dl. Her pelvic MRI showed possible involvement of the left parametria and the left ovary. No clear progression. Bx may not have obtained adequate sample. Plan was to repeat Monday. All teams continue to recommend remaining in house. Plan for surgery Tuesday. Keep Hgb 8-9 g/dl. We will continue to follow closely over the weekend. I will stop by to review surgery again tomorrow as Friday was abrupted by the discussion to remain in house.     Mary Ellen Isbell MD  Gynecologic Oncology  [facility]  [facility]: [phone]

## 2025-07-13 LAB
ERYTHROCYTE [DISTWIDTH] IN BLOOD BY AUTOMATED COUNT: 19.9 % (ref 10–15)
GLUCOSE BLDC GLUCOMTR-MCNC: 193 MG/DL (ref 70–99)
GLUCOSE BLDC GLUCOMTR-MCNC: 237 MG/DL (ref 70–99)
GLUCOSE BLDC GLUCOMTR-MCNC: 278 MG/DL (ref 70–99)
GLUCOSE BLDC GLUCOMTR-MCNC: 306 MG/DL (ref 70–99)
GLUCOSE BLDC GLUCOMTR-MCNC: 319 MG/DL (ref 70–99)
HCT VFR BLD AUTO: 26.4 % (ref 35–47)
HGB BLD-MCNC: 8 G/DL (ref 11.7–15.7)
MCH RBC QN AUTO: 23.7 PG (ref 26.5–33)
MCHC RBC AUTO-ENTMCNC: 30.3 G/DL (ref 31.5–36.5)
MCV RBC AUTO: 78 FL (ref 78–100)
PLATELET # BLD AUTO: 298 10E3/UL (ref 150–450)
RBC # BLD AUTO: 3.37 10E6/UL (ref 3.8–5.2)
WBC # BLD AUTO: 10.8 10E3/UL (ref 4–11)

## 2025-07-13 PROCEDURE — 250N000013 HC RX MED GY IP 250 OP 250 PS 637: Performed by: STUDENT IN AN ORGANIZED HEALTH CARE EDUCATION/TRAINING PROGRAM

## 2025-07-13 PROCEDURE — 120N000001 HC R&B MED SURG/OB

## 2025-07-13 PROCEDURE — 36415 COLL VENOUS BLD VENIPUNCTURE: CPT | Performed by: INTERNAL MEDICINE

## 2025-07-13 PROCEDURE — 99232 SBSQ HOSP IP/OBS MODERATE 35: CPT | Performed by: INTERNAL MEDICINE

## 2025-07-13 PROCEDURE — 85014 HEMATOCRIT: CPT | Performed by: INTERNAL MEDICINE

## 2025-07-13 RX ADMIN — Medication 100 MCG: at 10:07

## 2025-07-13 RX ADMIN — TRAZODONE HYDROCHLORIDE 100 MG: 50 TABLET ORAL at 21:25

## 2025-07-13 RX ADMIN — INSULIN ASPART 4 UNITS: 100 INJECTION, SOLUTION INTRAVENOUS; SUBCUTANEOUS at 17:39

## 2025-07-13 RX ADMIN — CETIRIZINE HYDROCHLORIDE 10 MG: 10 TABLET, FILM COATED ORAL at 10:07

## 2025-07-13 RX ADMIN — SENNOSIDES AND DOCUSATE SODIUM 2 TABLET: 50; 8.6 TABLET ORAL at 10:14

## 2025-07-13 RX ADMIN — INSULIN ASPART 4 UNITS: 100 INJECTION, SOLUTION INTRAVENOUS; SUBCUTANEOUS at 12:25

## 2025-07-13 RX ADMIN — PANTOPRAZOLE SODIUM 40 MG: 40 TABLET, DELAYED RELEASE ORAL at 10:07

## 2025-07-13 RX ADMIN — FLUOXETINE HYDROCHLORIDE 40 MG: 20 CAPSULE ORAL at 10:06

## 2025-07-13 RX ADMIN — POLYETHYLENE GLYCOL 3350 17 G: 17 POWDER, FOR SOLUTION ORAL at 10:14

## 2025-07-13 RX ADMIN — INSULIN ASPART 2 UNITS: 100 INJECTION, SOLUTION INTRAVENOUS; SUBCUTANEOUS at 07:58

## 2025-07-13 RX ADMIN — Medication 1000 MG: at 10:06

## 2025-07-13 RX ADMIN — FAMOTIDINE 20 MG: 20 TABLET ORAL at 10:07

## 2025-07-13 RX ADMIN — FAMOTIDINE 20 MG: 20 TABLET ORAL at 21:24

## 2025-07-13 ASSESSMENT — ACTIVITIES OF DAILY LIVING (ADL)
ADLS_ACUITY_SCORE: 41

## 2025-07-13 NOTE — PROGRESS NOTES
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 She is s/p Mirena IUD insertion 8/20/24 and thereafter failed to follow up. She has now been seen in an urgent, acute fashion with heavy vaginal bleeding and severe anemia on multiple occasions with evidence for progressive disease. She was recommended surgical management in May, 2025 as an outpatient and failed to follow through with surgical planning.     Subjective:   Bleeding is moderately improved.  Monica states she has spotting with intermittent increased bleeding but largely less than last week.  She denies lightheadedness or dizziness.  She understands that she is staying in the hospital in anticipation for surgery and understands the goals to make sure that her hemoglobin does not drop.    EXAM:  Intake/Output Summary (Last 24 hours) at 7/11/2025 1051  Last data filed at 7/10/2025 2050  Gross per 24 hour   Intake 870 ml   Output --   Net 870 ml        Vitals: /76 (BP Location: Right arm)   Pulse 102   Temp 99  F (37.2  C) (Oral)   Resp 18   Ht 1.626 m (5' 4\")   Wt 113.4 kg (250 lb)   SpO2 99%   BMI 42.91 kg/m    BMI= Body mass index is 42.91 kg/m .     GENERAL: alert, NAD; obese -improved pallor  CV: RRR, no murmurs  RESPIRATORY: no dyspnea, clear bilat  ABDOMEN: soft, non-tender, no r/g  EXTREMITY: no edema, neg Tree's  SKIN: warm and dry, no jaundice no rashes  NEURO: cranial nerves II-XII grossly intact, motor exam intact    LABS  Recent Labs   Lab Test 07/12/25  0958 07/11/25  0657 07/10/25  0601   WBC  --  13.4* 10.0   RBC  --  3.87 3.26*   HGB 8.2* 9.3* 7.5*   HCT  --  29.6* 24.3*   MCV 77* 77* 75*   MCH  --  " 24.0* 23.0*   MCHC  --  31.4* 30.9*   PLT  --  331 293     Recent Labs   Lab Test 07/12/25  0742 07/11/25  0657 07/10/25  0601 07/09/25  1931 07/09/25  1504   POTASSIUM 4.3 4.0 4.3   < > 4.3   CHLORIDE  --   --  105  --  103   BUN  --   --  11.3  --  16.5    < > = values in this interval not displayed.      Recent Labs   Lab Test 07/10/25  0601 02/22/24  1625 10/07/22  0907   PROTEIN  --   --  30*   BILITOTAL 0.7 0.4  --    AST 10 18  --    ALT 10 27  --        ASSESSMENT  39 year old female with endometrial cancer, diagnosed back in May 2024. Mirena IUD placed and patient was lost to follow up due to lapse in insurance.  Recently seen as outpatient with imaging concerning for disease progression. Now admitted for profound anemia from worsening vaginal bleeding. Bilateral pulmonary nodules idenitifed on chest imaging.     Endometrial cancer:   -concern for progression on 5/30/25 MRI scan, discussion for surgical intervention at that time. Monica failed to follow through with surgical planning despite recommendations.   -Repeat MRI 7/11/2025 without significant progression of disease.  Concern for possible left parametrial involvement.  Left ovarian involvement.  -Discussed with patient that given significant bleeding would like to proceed with surgical intervention during hospitalization.  Scheduled for next Tuesday.   -Hgb at least between 8-9 in preparation for surgery.  Doing well today.  Plan on rechecking hemoglobin tomorrow.  - Plan for RATLH/BSO/SLN Bx Tuesday 7/15/25.  We discussed the surgery in great detail today.  We reviewed the steps and discussed the benefits including discontinuation of hemorrhage.  We reviewed the risks of surgery including bleeding, infection, damage to surrounding structures.  We reviewed general perioperative expectations.     Pulmonary nodule  -Bilateral pulmonary nodules noted on CT chest, largest size 10mm  -Status post attempt at percutaneous biopsy without significant tissue  return.  Repeat attempt Monday, 7/14/2025.  - Plan to proceed with surgical management Tuesday, 7/15/2025 as patient is bleeding very heavily and needs the primary bleeding tumor removed.      DM  -blood sugar management per medicine team, appreciate assistance     Hepatitis B  -Patient reports history of hepatitis B but admits to improper follow up  -Liver labs wnl  -Notified medicine team     Mary Ellen Isbell MD  Gynecologic Oncology  [facility]  [facility]: [phone]

## 2025-07-13 NOTE — PLAN OF CARE
Goal Outcome Evaluation:      Problem: Pain Acute  Goal: Optimal Pain Control and Function  Outcome: Progressing     Problem: Adult Inpatient Plan of Care  Goal: Optimal Comfort and Wellbeing  Outcome: Progressing    Pt AO x4. Denies pain. Able to make needs known. Up IND in the room.

## 2025-07-13 NOTE — PLAN OF CARE
Problem: Adult Inpatient Plan of Care  Goal: Optimal Comfort and Wellbeing  Outcome: Progressing     Problem: Comorbidity Management  Goal: Blood Glucose Levels Within Targeted Range  Outcome: Progressing     Problem: Pain Acute  Goal: Optimal Pain Control and Function  Outcome: Progressing   Goal Outcome Evaluation:    A/Ox4. Denied pain and nausea.     BG of 175 at bedtime. Later had a late meal and BG was 237 afterward.    IV was removed yesterday afternoon. Approached pt about placing new IV and pt refused; stated she wanted to leave it out overnight so it would not interfere with her sleep.     Resting between cares. Independent in room.        Marlo Zendejas RN               10-Jul-2023 14:09

## 2025-07-13 NOTE — PROGRESS NOTES
[facility]    Medicine Progress Note - Hospitalist Service    Date of Admission:  7/9/2025    Assessment & Plan   Monica Doshi is a 39-year-old female with history of endometrial cancer, asthma, and GERD admitted on 7/9/2025 with severe acute blood loss anemia secondary to worsening vaginal bleeding in the setting of progressive endometrial cancer.     She was transfused with 3 units of PRBC. CT angiogram of the abdomen and pelvis performed on 7/9/2025 showed fluid within the endometrial canal and proximal vaginal canal likely representing blood clots and 2 visualized pulmonary nodules, largest measuring 0.8 x 0.9 cm within the lingula. Report of pelvic MRI performed 7/11/2025 is pending.     Chest CT performed on 7/10/2025 showed bilateral pulmonary nodules measuring up to 10 mm pulmonologist recommends lung biopsy.  Patient was evaluated by pulmonologist who recommended lung biopsy which was subsequently performed by IR on 7/11/2025.  Report of US guided lung biopsy performed on 7/11/2025 is pending. Hepatitis panel ordered during hospital stay due to reports of chronic hepatitis returned positive for hepatitis B surface antigen.  She will follow-up at the GI clinic for further management of chronic hepatitis.       On 7/11/2025, she opted to leave the hospital against medical advice in order to address personal issues, including packing belongings in anticipation of an impending eviction from her current residence.  Patient was advised to remain in the hospital due to ongoing vaginal bleed and risk of severe anemia which might require further blood transfusion, however, she patient declined. OB/GYN oncology service has arranged for a repeat laboratory testing including hemoglobin check on 7/14/2025.  She is also scheduled to return for robotic-assisted total laparoscopic hysterectomy with bilateral salpingo-oophorectomy and sentinel lymph node biopsy on 7/15/2025.       #Acute Blood Loss  "Anemia  #Endometrial Cancer  CTA A/P showed fluid within the endometrial canal and proximal vaginal canal likely representing blood clots. She was transfused with 2 units PRBCs in the ED. Ordered additional 1 unit of PRBC.  -Pelvic RWE-gnxfxz-th report  -Surgery tentatively scheduled for 7/15/2025 by OB/GYN  - OB/GYN [id number] assistance    #Type 2 Diabetes  - Last A1c was 6.5% in 05/2025  - Home medications include metformin, tirzepatide  - Hold home metformin, tirzepatide  - Sliding Scale Insulin with Glucose Checks    Concern for chronic hepatitis  -Follow-up hepatitis panel  -Monitor LFT  Will consider GI/hepatology evaluation depending on results    #Pulmonary Nodules  -Unclear whether this is secondary to metastasis from endometrial cancer  -Incidental finding on CT Abd in the ED  -IR biopsy-performed on 7/11/2025; follow-up biopsy report    #Mood Disorder - Continue PTA Fluoxetine  #Asthma - Continue PTA Inhaler  #GERD - Continue PTA Famotidine/Omeprazole      Diet: Diet  NPO for Procedure/Surgery per Anesthesia Guidelines Except for: Meds; Clear liquids before procedure/surgery: ADULT (Age GREATER than or Equal to 18 years) - Clear liquids 2 hours before procedure/surgery  Regular Diet Adult    DVT Prophylaxis: Pneumatic Compression Devices  Navarro Catheter: Not present  Lines: None     Cardiac Monitoring: None  Code Status: Full Code      Clinically Significant Risk Factors                                # Morbid Obesity: Estimated body mass index is 42.91 kg/m  as calculated from the following:    Height as of this encounter: 1.626 m (5' 4\").    Weight as of this encounter: 113.4 kg (250 lb)., PRESENT ON ADMISSION     # Financial/Environmental Concerns: eviction pending (pt states she is behind on rent)  # Asthma: noted on problem list        Social Drivers of Health    Food Insecurity: High Risk (7/10/2025)    Food Insecurity     Within the past 12 months, did you worry that your food would run " out before you got money to buy more?: Yes     Within the past 12 months, did the food you bought just not last and you didn t have money to get more?: No   Housing Stability: High Risk (7/10/2025)    Housing Stability     Do you have housing? : Yes     Are you worried about losing your housing?: Yes   Tobacco Use: High Risk (1/31/2025)    Received from [facility]    Patient History     Smoking Tobacco Use: Every Day     Smokeless Tobacco Use: Never     Passive Exposure: Never   Social Connections: Unknown (5/10/2024)    Social Connection and Isolation Panel [NHANES]     Frequency of Social Gatherings with Friends and Family: Once a week          Disposition Plan     Medically Ready for Discharge: Anticipated in 2-4 Days      Jannette Macias MD  Hospitalist Service  [facility]  Securely message with [employer] (more info)  Text page via [facility] Paging/Directory   ______________________________________________________________________    Interval History   No new complaints today and no acute events overnight.  Hemoglobin is stable.  Patient has agreed to remain in the hospital in anticipation for planned surgery.    Physical Exam   Vital Signs: Temp: 98.2  F (36.8  C) Temp src: Oral BP: 130/77 Pulse: 88   Resp: 20 SpO2: 95 % O2 Device: None (Room air)    Weight: 250 lbs 0 oz    General appearance: Obese, pale, awake, Alert, Cooperative, not in any obvious distress and appears stated age   HEENT: Normocephalic, atraumatic, conjunctiva clear without icterus and ears without discharge  Lungs: Clear to auscultation bilaterally, no wheezing, good air exchange, normal work of breathing  Cardiovascular: Regular Rate and Rythm, normal apical impulse, normal S1 and S2, no lower extremity edema bilaterally  Abdomen: Soft, non-tender and Non-distended, active bowel sounds  Skin: Skin color, texture normal and bruising or bleeding. No rashes or lesions over face, neck, arms and legs, turgor  normal.  Musculoskeletal: No bony deformities or joint tenderness. Normal ROM upon flexion & extension.   Neurologic: Alert & Oriented X 3, Facial symmetry preserved and upper & lower extremities moving well with symmetry  Psychiatric: Calm, normal eye contact and normal affect      Medical Decision Making       55 MINUTES SPENT BY ME on the date of service doing chart review, history, exam, documentation & further activities per the note.      Data

## 2025-07-14 ENCOUNTER — PATIENT OUTREACH (OUTPATIENT)
Dept: CARE COORDINATION | Facility: CLINIC | Age: 40
End: 2025-07-14
Payer: COMMERCIAL

## 2025-07-14 LAB
ABO + RH BLD: NORMAL
BLD GP AB SCN SERPL QL: NEGATIVE
BLD PROD TYP BPU: NORMAL
BLOOD COMPONENT TYPE: NORMAL
CODING SYSTEM: NORMAL
CROSSMATCH: NORMAL
GLUCOSE BLDC GLUCOMTR-MCNC: 189 MG/DL (ref 70–99)
GLUCOSE BLDC GLUCOMTR-MCNC: 217 MG/DL (ref 70–99)
GLUCOSE BLDC GLUCOMTR-MCNC: 218 MG/DL (ref 70–99)
GLUCOSE BLDC GLUCOMTR-MCNC: 232 MG/DL (ref 70–99)
GLUCOSE BLDC GLUCOMTR-MCNC: 287 MG/DL (ref 70–99)
HGB BLD-MCNC: 7.5 G/DL (ref 11.7–15.7)
HGB BLD-MCNC: 8.7 G/DL (ref 11.7–15.7)
ISSUE DATE AND TIME: NORMAL
MAGNESIUM SERPL-MCNC: 2 MG/DL (ref 1.7–2.3)
MCV RBC AUTO: 78 FL (ref 78–100)
MCV RBC AUTO: 79 FL (ref 78–100)
PHOSPHATE SERPL-MCNC: 3.1 MG/DL (ref 2.5–4.5)
POTASSIUM SERPL-SCNC: 4.3 MMOL/L (ref 3.4–5.3)
SPECIMEN EXP DATE BLD: NORMAL
UNIT ABO/RH: NORMAL
UNIT NUMBER: NORMAL
UNIT STATUS: NORMAL
UNIT TYPE ISBT: 6200

## 2025-07-14 PROCEDURE — 99233 SBSQ HOSP IP/OBS HIGH 50: CPT | Performed by: INTERNAL MEDICINE

## 2025-07-14 PROCEDURE — 36415 COLL VENOUS BLD VENIPUNCTURE: CPT | Performed by: STUDENT IN AN ORGANIZED HEALTH CARE EDUCATION/TRAINING PROGRAM

## 2025-07-14 PROCEDURE — 84132 ASSAY OF SERUM POTASSIUM: CPT | Performed by: INTERNAL MEDICINE

## 2025-07-14 PROCEDURE — 85018 HEMOGLOBIN: CPT | Performed by: OBSTETRICS & GYNECOLOGY

## 2025-07-14 PROCEDURE — 86923 COMPATIBILITY TEST ELECTRIC: CPT | Performed by: PHYSICIAN ASSISTANT

## 2025-07-14 PROCEDURE — 250N000011 HC RX IP 250 OP 636: Performed by: STUDENT IN AN ORGANIZED HEALTH CARE EDUCATION/TRAINING PROGRAM

## 2025-07-14 PROCEDURE — 250N000013 HC RX MED GY IP 250 OP 250 PS 637: Performed by: STUDENT IN AN ORGANIZED HEALTH CARE EDUCATION/TRAINING PROGRAM

## 2025-07-14 PROCEDURE — 120N000001 HC R&B MED SURG/OB

## 2025-07-14 PROCEDURE — 83735 ASSAY OF MAGNESIUM: CPT | Performed by: INTERNAL MEDICINE

## 2025-07-14 PROCEDURE — 86923 COMPATIBILITY TEST ELECTRIC: CPT | Performed by: OBSTETRICS & GYNECOLOGY

## 2025-07-14 PROCEDURE — P9016 RBC LEUKOCYTES REDUCED: HCPCS | Performed by: PHYSICIAN ASSISTANT

## 2025-07-14 PROCEDURE — 85018 HEMOGLOBIN: CPT | Performed by: STUDENT IN AN ORGANIZED HEALTH CARE EDUCATION/TRAINING PROGRAM

## 2025-07-14 PROCEDURE — 86900 BLOOD TYPING SEROLOGIC ABO: CPT | Performed by: INTERNAL MEDICINE

## 2025-07-14 PROCEDURE — 36415 COLL VENOUS BLD VENIPUNCTURE: CPT | Performed by: OBSTETRICS & GYNECOLOGY

## 2025-07-14 PROCEDURE — 84100 ASSAY OF PHOSPHORUS: CPT | Performed by: INTERNAL MEDICINE

## 2025-07-14 PROCEDURE — 36415 COLL VENOUS BLD VENIPUNCTURE: CPT | Performed by: INTERNAL MEDICINE

## 2025-07-14 RX ADMIN — FLUOXETINE HYDROCHLORIDE 40 MG: 20 CAPSULE ORAL at 10:52

## 2025-07-14 RX ADMIN — ONDANSETRON 4 MG: 4 TABLET, ORALLY DISINTEGRATING ORAL at 10:43

## 2025-07-14 RX ADMIN — PANTOPRAZOLE SODIUM 40 MG: 40 TABLET, DELAYED RELEASE ORAL at 06:37

## 2025-07-14 RX ADMIN — FAMOTIDINE 20 MG: 20 TABLET ORAL at 21:05

## 2025-07-14 RX ADMIN — OXYCODONE HYDROCHLORIDE 10 MG: 5 TABLET ORAL at 02:23

## 2025-07-14 RX ADMIN — INSULIN ASPART 1 UNITS: 100 INJECTION, SOLUTION INTRAVENOUS; SUBCUTANEOUS at 08:25

## 2025-07-14 RX ADMIN — TRAZODONE HYDROCHLORIDE 100 MG: 50 TABLET ORAL at 21:05

## 2025-07-14 RX ADMIN — CETIRIZINE HYDROCHLORIDE 10 MG: 10 TABLET, FILM COATED ORAL at 10:51

## 2025-07-14 RX ADMIN — Medication 1000 MG: at 10:51

## 2025-07-14 RX ADMIN — SENNOSIDES AND DOCUSATE SODIUM 2 TABLET: 50; 8.6 TABLET ORAL at 21:05

## 2025-07-14 RX ADMIN — INSULIN ASPART 2 UNITS: 100 INJECTION, SOLUTION INTRAVENOUS; SUBCUTANEOUS at 18:24

## 2025-07-14 RX ADMIN — FERROUS GLUCONATE 324 MG: 324 TABLET ORAL at 10:52

## 2025-07-14 RX ADMIN — OXYCODONE HYDROCHLORIDE 10 MG: 5 TABLET ORAL at 06:37

## 2025-07-14 RX ADMIN — Medication 100 MCG: at 10:52

## 2025-07-14 RX ADMIN — INSULIN ASPART 2 UNITS: 100 INJECTION, SOLUTION INTRAVENOUS; SUBCUTANEOUS at 14:05

## 2025-07-14 RX ADMIN — FAMOTIDINE 20 MG: 20 TABLET ORAL at 10:52

## 2025-07-14 ASSESSMENT — ACTIVITIES OF DAILY LIVING (ADL)
ADLS_ACUITY_SCORE: 45
ADLS_ACUITY_SCORE: 46
ADLS_ACUITY_SCORE: 41
ADLS_ACUITY_SCORE: 46
ADLS_ACUITY_SCORE: 45
ADLS_ACUITY_SCORE: 45
ADLS_ACUITY_SCORE: 46
ADLS_ACUITY_SCORE: 45
ADLS_ACUITY_SCORE: 46
ADLS_ACUITY_SCORE: 46
ADLS_ACUITY_SCORE: 41
ADLS_ACUITY_SCORE: 46
ADLS_ACUITY_SCORE: 45
ADLS_ACUITY_SCORE: 46
ADLS_ACUITY_SCORE: 45

## 2025-07-14 NOTE — PROGRESS NOTES
Clinic Care Coordination Contact  [facility]/Voicemail    Clinical Data: Care Coordinator Outreach    Outreach Documentation Number of Outreach Attempt   7/14/2025   9:55 AM 1       Left message on patient's voicemail with call back information and requested return call.    Chart review notes discharge AMA due to person SDOH issues  Pt was evaluated by Ob/Gyn team and it looks like she will be returning for procedure on 7.15  RN CC will monitor chart for discharge     Plan:   RN CC will monitor for discharge and outreach per standard work

## 2025-07-14 NOTE — PROGRESS NOTES
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 She is s/p Mirena IUD insertion 8/20/24 and thereafter failed to follow up. She has now been seen in an urgent, acute fashion with heavy vaginal bleeding and severe anemia on multiple occasions with evidence for progressive disease. She was recommended surgical management in May, 2025 as an outpatient and failed to follow through with surgical planning.     Subjective:   Patient reports increased bleeding yesterday, which she especially noticed after eating and has been passing some quarter sized clots. She denies soaking a pad in an hour. She is currently NPO but eating a bag of Doritos, and would like her diet order changed so she can order breakfast. She tells me she will not be doing her bx until after surgery.     EXAM:  Intake/Output Summary (Last 24 hours) at 7/11/2025 1051  Last data filed at 7/10/2025 2050  Gross per 24 hour   Intake 870 ml   Output --   Net 870 ml        Vitals: /74 (BP Location: Right arm)   Pulse 76   Temp 98.3  F (36.8  C) (Oral)   Resp 18   Ht 1.626 m (5' 4\")   Wt 113.4 kg (250 lb)   SpO2 95%   BMI 42.91 kg/m    BMI= Body mass index is 42.91 kg/m .     GENERAL: alert, NAD; obese -improved pallor  RESPIRATORY: no dyspnea  SKIN: warm and dry, no jaundice no rashes    LABS  Recent Labs   Lab Test 07/14/25  0632 07/13/25  0743 07/12/25  0958 07/11/25  0657   WBC  --  10.8  --  13.4*   RBC  --  3.37*  --  3.87   HGB 7.5* 8.0*   < > 9.3*   HCT  --  26.4*  --  29.6*   MCV 78 78   < > 77*   MCH  --  23.7*  --  24.0*   MCHC  --  30.3*  --  31.4*   PLT  --  298  --  331    < > = values in this interval " not displayed.     Recent Labs   Lab Test 07/12/25  0742 07/11/25  0657 07/10/25  0601 07/09/25  1931 07/09/25  1504   POTASSIUM 4.3 4.0 4.3   < > 4.3   CHLORIDE  --   --  105  --  103   BUN  --   --  11.3  --  16.5    < > = values in this interval not displayed.      Recent Labs   Lab Test 07/10/25  0601 02/22/24  1625 10/07/22  0907   PROTEIN  --   --  30*   BILITOTAL 0.7 0.4  --    AST 10 18  --    ALT 10 27  --        ASSESSMENT  39 year old female with endometrial cancer, diagnosed back in May 2024. Mirena IUD placed and patient was lost to follow up due to lapse in insurance.  Recently seen as outpatient with imaging concerning for disease progression. Now admitted for profound anemia from worsening vaginal bleeding. Bilateral pulmonary nodules idenitifed on chest imaging.     Endometrial cancer:   -concern for progression on 5/30/25 MRI scan, discussion for surgical intervention at that time. Monica failed to follow through with surgical planning despite recommendations.   -Repeat MRI 7/11/2025 without significant progression of disease.  Concern for possible left parametrial involvement.  Left ovarian involvement.  -Discussed with patient that given significant bleeding recommend surgical intervention during hospitalization.  Scheduled for tomorrow.  -Hgb at least between 8-9 in preparation for surgery.  Hgb down to 7.5 today, ordered 1 unit pRBCS.  - Plan for RATLH/BSO/SLN Bx tomorrow 7/15/25.  Dr. Abe Isbell discussed the surgery in great detail yesterday. They reviewed the steps and discussed the benefits including discontinuation of hemorrhage.  We reviewed the risks of surgery including bleeding, infection, damage to surrounding structures.  We reviewed general perioperative expectations.     Pulmonary nodule  -Bilateral pulmonary nodules noted on CT chest, largest size 10mm  -Status post attempt at percutaneous biopsy without significant tissue return.  Repeat attempt was planned for today, but patient  tells me today that she does not want to do the bx until after her surgery.  - Plan to proceed with surgical management Tuesday, 7/15/2025 as patient is bleeding very heavily and needs the primary bleeding tumor removed.      DM  -blood sugar management per medicine team, appreciate assistance     Hepatitis B  -Patient reports history of hepatitis B but admits to improper follow up  -Liver labs irvin Bruno PA-C  Gynecologic Oncology  [facility]  [facility]: [phone]

## 2025-07-14 NOTE — PROGRESS NOTES
SPIRITUAL HEALTH SERVICES Progress Note  [facility], P2    Saw pt Monica Doshi per staff referral/patient request.    Patient/Family Understanding of Illness and Goals of Care - Monica shared about her upcoming surgery tomorrow to remove her uterus. She also stated that she will eventually have a lung biopsy to see if her cancer has spread there.     Distress and Loss - Anticipation of surgery tomorrow and future medical procedures.     Strengths, Coping, and Resources - Not discussed during this visit.     Meaning, Beliefs, and Spirituality - Monica would like prayer pre-surgery tomorrow and Monica greatly appreciated visit last week from  Mary Ellen.  will offer supportive presence tomorrow, as able.    Plan of Care - [facility] will visit again tomorrow as able pre-surgery.     Atif Troy MDiv, [facility]  /Manager Spiritual Health Services  [phone]       Spiritual Health Services is available 24/7 for emergent requests and consults, either by paging the on-call  or by entering an ASAP/STAT consult in [employer], which will also page the on-call .

## 2025-07-14 NOTE — PLAN OF CARE
"  Problem: Adult Inpatient Plan of Care  Goal: Optimal Comfort and Wellbeing  Intervention: Monitor Pain and Promote Comfort  Recent Flowsheet Documentation  Taken 7/14/2025 0223 by Viki Parrish, RN  Pain Management Interventions: medication (see MAR)   Goal Outcome Evaluation:  Pt is alert and oriented, able to make needs known. Pt is up independently in her room. Pt has had more vaginal bleeding this shift. Per pt she has had it 3 times overnight. PRN oxycodone given for abdominal pain. Pt is NPO for biopsy. Per pt \"I'm not doing another biopsy I just want to do my surgery and worry about that later, one thing at a time\". .  Pt does not currently have an iv. Hgb check this morning.  Viki Parrish, RN                            "

## 2025-07-14 NOTE — PROGRESS NOTES
[facility] Progress Note - Hospitalist Service    Date of Admission:  7/9/2025    Assessment & Plan   Monica Doshi is a 39-year-old female with history of endometrial cancer, asthma, and GERD admitted on 7/9/2025 with severe acute blood loss anemia secondary to worsening vaginal bleeding in the setting of progressive endometrial cancer.     She was transfused with 4 units of PRBC during hospital stay. CT angiogram of the abdomen and pelvis performed on 7/9/2025 showed fluid within the endometrial canal and proximal vaginal canal likely representing blood clots and 2 visualized pulmonary nodules. MRI 7/11/2025 demonstrated slight interval decrease in the infiltrative mass involving the inferior uterus and cervix with persistent bilateral parametrial involvement and suspicious pelvic sidewall lymphadenopathy.     Chest CT performed on 7/10/2025 showed bilateral pulmonary nodules measuring up to 10 mm pulmonologist recommends lung biopsy.  Patient was evaluated by pulmonologist who recommended lung biopsy.  Lung biopsy attempted by IR on 7/11/2025 was unsuccessful.  Patient declined repeat biopsy attempt by IR on 7/14/2025 stating that she has decided to defer procedure until after surgery by OB/GYN. Hepatitis panel ordered during hospital stay due to reports of chronic hepatitis returned positive for hepatitis B surface antigen.      She is scheduled for robotic-assisted total laparoscopic hysterectomy with bilateral salpingo-oophorectomy and sentinel lymph node biopsy on 7/15/2025.      #Acute Blood Loss Anemia  #Endometrial Cancer  CTA A/P showed fluid within the endometrial canal and proximal vaginal canal likely representing blood clots. She was transfused with 3 units PRBCs in the ED. Ordered additional 1 unit of PRBC.  -Transfused with additional 1 unit of PRBC on 7/14/2025  -Surgery tentatively scheduled for 7/15/2025 by OB/GYN  - OB/GYN [id number] assistance    #Type  "2 Diabetes  - Last A1c was 6.5% in 05/2025  - Home medications include metformin, tirzepatide  - Hold home metformin, tirzepatide  - Sliding Scale Insulin with Glucose Checks    Concern for chronic hepatitis  -Follow-up hepatitis panel  -Monitor LFT  Will consider GI/hepatology evaluation depending on results    #Pulmonary Nodules  -Unclear whether this is secondary to metastasis from endometrial cancer  -Incidental finding on CT Abd in the ED  -IR biopsy-performed on 7/11/2025; follow-up biopsy report    #Mood Disorder - Continue PTA Fluoxetine  #Asthma - Continue PTA Inhaler  #GERD - Continue PTA Famotidine/Omeprazole      Diet: Diet  NPO for Procedure/Surgery per Anesthesia Guidelines Except for: Meds; Clear liquids before procedure/surgery: ADULT (Age GREATER than or Equal to 18 years) - Clear liquids 2 hours before procedure/surgery  Moderate Consistent Carb (60 g CHO per Meal) Diet    DVT Prophylaxis: Pneumatic Compression Devices  Navarro Catheter: Not present  Lines: None     Cardiac Monitoring: None  Code Status: Full Code      Clinically Significant Risk Factors                                # Morbid Obesity: Estimated body mass index is 42.91 kg/m  as calculated from the following:    Height as of this encounter: 1.626 m (5' 4\").    Weight as of this encounter: 113.4 kg (250 lb).      # Financial/Environmental Concerns: eviction pending (pt states she is behind on rent)  # Asthma: noted on problem list        Social Drivers of Health    Food Insecurity: High Risk (7/10/2025)    Food Insecurity     Within the past 12 months, did you worry that your food would run out before you got money to buy more?: Yes     Within the past 12 months, did the food you bought just not last and you didn t have money to get more?: No   Housing Stability: High Risk (7/10/2025)    Housing Stability     Do you have housing? : Yes     Are you worried about losing your housing?: Yes   Tobacco Use: High Risk (1/31/2025)    Received " from [facility]    Patient History     Smoking Tobacco Use: Every Day     Smokeless Tobacco Use: Never     Passive Exposure: Never   Social Connections: Unknown (5/10/2024)    Social Connection and Isolation Panel [NHANES]     Frequency of Social Gatherings with Friends and Family: Once a week          Disposition Plan     Medically Ready for Discharge: Anticipated in 2-4 Days      Jannette Macias MD  Hospitalist Service  [facility]  Securely message with [employer] (more info)  Text page via [employer] Paging/Directory   ______________________________________________________________________    Interval History   No new complaints today and no acute events overnight.  Hemoglobin is stable.  Patient has agreed to remain in the hospital in anticipation for planned surgery.    Physical Exam   Vital Signs: Temp: 98  F (36.7  C) Temp src: Oral BP: (!) 181/92 Pulse: 92   Resp: 18 SpO2: 99 % O2 Device: None (Room air)    Weight: 250 lbs 0 oz    General appearance: Obese, pale, awake, Alert, Cooperative, not in any obvious distress and appears stated age   HEENT: Normocephalic, atraumatic, conjunctiva clear without icterus and ears without discharge  Lungs: Clear to auscultation bilaterally, no wheezing, good air exchange, normal work of breathing  Cardiovascular: Regular Rate and Rythm, normal apical impulse, normal S1 and S2, no lower extremity edema bilaterally  Abdomen: Soft, non-tender and Non-distended, active bowel sounds  Skin: Skin color, texture normal and bruising or bleeding. No rashes or lesions over face, neck, arms and legs, turgor normal.  Musculoskeletal: No bony deformities or joint tenderness. Normal ROM upon flexion & extension.   Neurologic: Alert & Oriented X 3, Facial symmetry preserved and upper & lower extremities moving well with symmetry  Psychiatric: Calm, normal eye contact and normal affect      Medical Decision Making       55 MINUTES SPENT BY ME on the date of service  doing chart review, history, exam, documentation & further activities per the note.      Data

## 2025-07-14 NOTE — PLAN OF CARE
Goal Outcome Evaluation:  Patient is alert and oriented and able to make needs known. Speaks English, no  needed.  Denies pain.  Complained of nausea this am which delayed breakfast but felt much better after PO Zofran. Patient took pills well, whole with water. Was NPO for Lung biopsy this morning but she declined. She stated she had spoken with Dr Isbell yesterday about this. When IR called for patient, situation was explained and procedure cancelled for today. GYN Oncology ordered diabetic diet. Patient tolerating meals well the rest of the day.  Mg, K and Phos protocols ran-no replacement needed. Rechecks in am.  Patient continues to have vaginal bleeding. Pt states it was worse overnight than today. Hgb this morning was 7.5. Getting 1 unit of PRBC now in anticipation of surgery tomorrow. Plan is for Hysterectomy tomorrow and goal Hgb prior to surgery is 8-9 per provider. Patient encouraged to leave PIV in place as she will need for surgery tomorrow and required PICC team to place.  Patient is up ot the BR independently. Took shower today.  Patient requested follow up from spiritual care prior to surgery. Call made to that department and message left for Mary Ellen if possible.

## 2025-07-15 ENCOUNTER — ANESTHESIA (OUTPATIENT)
Dept: SURGERY | Facility: HOSPITAL | Age: 40
End: 2025-07-15
Payer: COMMERCIAL

## 2025-07-15 ENCOUNTER — ANESTHESIA EVENT (OUTPATIENT)
Dept: SURGERY | Facility: HOSPITAL | Age: 40
End: 2025-07-15
Payer: COMMERCIAL

## 2025-07-15 LAB
ANION GAP SERPL CALCULATED.3IONS-SCNC: 5 MMOL/L (ref 7–15)
BLD PROD TYP BPU: NORMAL
BLOOD COMPONENT TYPE: NORMAL
BUN SERPL-MCNC: 10.7 MG/DL (ref 6–20)
CALCIUM SERPL-MCNC: 9.6 MG/DL (ref 8.8–10.4)
CHLORIDE SERPL-SCNC: 103 MMOL/L (ref 98–107)
CODING SYSTEM: NORMAL
CREAT SERPL-MCNC: 0.63 MG/DL (ref 0.51–0.95)
CROSSMATCH: NORMAL
EGFRCR SERPLBLD CKD-EPI 2021: >90 ML/MIN/1.73M2
ERYTHROCYTE [DISTWIDTH] IN BLOOD BY AUTOMATED COUNT: 19.2 % (ref 10–15)
GLUCOSE BLDC GLUCOMTR-MCNC: 178 MG/DL (ref 70–99)
GLUCOSE BLDC GLUCOMTR-MCNC: 219 MG/DL (ref 70–99)
GLUCOSE BLDC GLUCOMTR-MCNC: 238 MG/DL (ref 70–99)
GLUCOSE BLDC GLUCOMTR-MCNC: 280 MG/DL (ref 70–99)
GLUCOSE SERPL-MCNC: 207 MG/DL (ref 70–99)
HCG UR QL: NEGATIVE
HCO3 SERPL-SCNC: 30 MMOL/L (ref 22–29)
HCT VFR BLD AUTO: 26.3 % (ref 35–47)
HGB BLD-MCNC: 8.2 G/DL (ref 11.7–15.7)
MAGNESIUM SERPL-MCNC: 2.2 MG/DL (ref 1.7–2.3)
MCH RBC QN AUTO: 24.3 PG (ref 26.5–33)
MCHC RBC AUTO-ENTMCNC: 31.2 G/DL (ref 31.5–36.5)
MCV RBC AUTO: 78 FL (ref 78–100)
PHOSPHATE SERPL-MCNC: 3.4 MG/DL (ref 2.5–4.5)
PLATELET # BLD AUTO: 272 10E3/UL (ref 150–450)
POTASSIUM SERPL-SCNC: 4 MMOL/L (ref 3.4–5.3)
RBC # BLD AUTO: 3.38 10E6/UL (ref 3.8–5.2)
SODIUM SERPL-SCNC: 138 MMOL/L (ref 135–145)
UNIT ABO/RH: NORMAL
UNIT NUMBER: NORMAL
UNIT STATUS: NORMAL
UNIT TYPE ISBT: 6200
WBC # BLD AUTO: 10.9 10E3/UL (ref 4–11)

## 2025-07-15 PROCEDURE — 250N000011 HC RX IP 250 OP 636: Performed by: PHYSICIAN ASSISTANT

## 2025-07-15 PROCEDURE — 250N000011 HC RX IP 250 OP 636: Mod: JW | Performed by: PHYSICIAN ASSISTANT

## 2025-07-15 PROCEDURE — 258N000003 HC RX IP 258 OP 636: Performed by: NURSE ANESTHETIST, CERTIFIED REGISTERED

## 2025-07-15 PROCEDURE — 88309 TISSUE EXAM BY PATHOLOGIST: CPT | Mod: 26 | Performed by: PATHOLOGY

## 2025-07-15 PROCEDURE — 4A1635H MONITORING OF LYMPHATIC FLOW USING INDOCYANINE GREEN DYE, PERCUTANEOUS APPROACH: ICD-10-PCS | Performed by: OBSTETRICS & GYNECOLOGY

## 2025-07-15 PROCEDURE — 250N000011 HC RX IP 250 OP 636: Performed by: NURSE ANESTHETIST, CERTIFIED REGISTERED

## 2025-07-15 PROCEDURE — 80048 BASIC METABOLIC PNL TOTAL CA: CPT | Performed by: STUDENT IN AN ORGANIZED HEALTH CARE EDUCATION/TRAINING PROGRAM

## 2025-07-15 PROCEDURE — 8E0W4CZ ROBOTIC ASSISTED PROCEDURE OF TRUNK REGION, PERCUTANEOUS ENDOSCOPIC APPROACH: ICD-10-PCS | Performed by: OBSTETRICS & GYNECOLOGY

## 2025-07-15 PROCEDURE — 0UT44ZZ RESECTION OF UTERINE SUPPORTING STRUCTURE, PERCUTANEOUS ENDOSCOPIC APPROACH: ICD-10-PCS | Performed by: OBSTETRICS & GYNECOLOGY

## 2025-07-15 PROCEDURE — 0WBH4ZX EXCISION OF RETROPERITONEUM, PERCUTANEOUS ENDOSCOPIC APPROACH, DIAGNOSTIC: ICD-10-PCS | Performed by: OBSTETRICS & GYNECOLOGY

## 2025-07-15 PROCEDURE — 07BC4ZX EXCISION OF PELVIS LYMPHATIC, PERCUTANEOUS ENDOSCOPIC APPROACH, DIAGNOSTIC: ICD-10-PCS | Performed by: OBSTETRICS & GYNECOLOGY

## 2025-07-15 PROCEDURE — 250N000009 HC RX 250: Performed by: NURSE ANESTHETIST, CERTIFIED REGISTERED

## 2025-07-15 PROCEDURE — 88342 IMHCHEM/IMCYTCHM 1ST ANTB: CPT | Mod: 26 | Performed by: PATHOLOGY

## 2025-07-15 PROCEDURE — 36415 COLL VENOUS BLD VENIPUNCTURE: CPT | Performed by: STUDENT IN AN ORGANIZED HEALTH CARE EDUCATION/TRAINING PROGRAM

## 2025-07-15 PROCEDURE — 0TBB4ZX EXCISION OF BLADDER, PERCUTANEOUS ENDOSCOPIC APPROACH, DIAGNOSTIC: ICD-10-PCS | Performed by: OBSTETRICS & GYNECOLOGY

## 2025-07-15 PROCEDURE — 258N000003 HC RX IP 258 OP 636: Performed by: ANESTHESIOLOGY

## 2025-07-15 PROCEDURE — 999N000141 HC STATISTIC PRE-PROCEDURE NURSING ASSESSMENT: Performed by: OBSTETRICS & GYNECOLOGY

## 2025-07-15 PROCEDURE — 272N000001 HC OR GENERAL SUPPLY STERILE: Performed by: OBSTETRICS & GYNECOLOGY

## 2025-07-15 PROCEDURE — 88305 TISSUE EXAM BY PATHOLOGIST: CPT | Mod: 26 | Performed by: PATHOLOGY

## 2025-07-15 PROCEDURE — 360N000080 HC SURGERY LEVEL 7, PER MIN: Performed by: OBSTETRICS & GYNECOLOGY

## 2025-07-15 PROCEDURE — 88307 TISSUE EXAM BY PATHOLOGIST: CPT | Mod: 26 | Performed by: PATHOLOGY

## 2025-07-15 PROCEDURE — 85018 HEMOGLOBIN: CPT | Performed by: STUDENT IN AN ORGANIZED HEALTH CARE EDUCATION/TRAINING PROGRAM

## 2025-07-15 PROCEDURE — 83735 ASSAY OF MAGNESIUM: CPT | Performed by: INTERNAL MEDICINE

## 2025-07-15 PROCEDURE — 370N000017 HC ANESTHESIA TECHNICAL FEE, PER MIN: Performed by: OBSTETRICS & GYNECOLOGY

## 2025-07-15 PROCEDURE — 0UT24ZZ RESECTION OF BILATERAL OVARIES, PERCUTANEOUS ENDOSCOPIC APPROACH: ICD-10-PCS | Performed by: OBSTETRICS & GYNECOLOGY

## 2025-07-15 PROCEDURE — 84100 ASSAY OF PHOSPHORUS: CPT | Performed by: INTERNAL MEDICINE

## 2025-07-15 PROCEDURE — 120N000001 HC R&B MED SURG/OB

## 2025-07-15 PROCEDURE — 81025 URINE PREGNANCY TEST: CPT | Performed by: PHYSICIAN ASSISTANT

## 2025-07-15 PROCEDURE — 250N000013 HC RX MED GY IP 250 OP 250 PS 637: Performed by: PHYSICIAN ASSISTANT

## 2025-07-15 PROCEDURE — 250N000009 HC RX 250: Performed by: OBSTETRICS & GYNECOLOGY

## 2025-07-15 PROCEDURE — 88360 TUMOR IMMUNOHISTOCHEM/MANUAL: CPT | Mod: TC | Performed by: OBSTETRICS & GYNECOLOGY

## 2025-07-15 PROCEDURE — 250N000013 HC RX MED GY IP 250 OP 250 PS 637: Performed by: STUDENT IN AN ORGANIZED HEALTH CARE EDUCATION/TRAINING PROGRAM

## 2025-07-15 PROCEDURE — 88341 IMHCHEM/IMCYTCHM EA ADD ANTB: CPT | Mod: 26 | Performed by: PATHOLOGY

## 2025-07-15 PROCEDURE — 88342 IMHCHEM/IMCYTCHM 1ST ANTB: CPT | Mod: TC | Performed by: OBSTETRICS & GYNECOLOGY

## 2025-07-15 PROCEDURE — 710N000009 HC RECOVERY PHASE 1, LEVEL 1, PER MIN: Performed by: OBSTETRICS & GYNECOLOGY

## 2025-07-15 PROCEDURE — 250N000011 HC RX IP 250 OP 636: Performed by: ANESTHESIOLOGY

## 2025-07-15 PROCEDURE — 99232 SBSQ HOSP IP/OBS MODERATE 35: CPT | Performed by: INTERNAL MEDICINE

## 2025-07-15 PROCEDURE — 0UT94ZZ RESECTION OF UTERUS, PERCUTANEOUS ENDOSCOPIC APPROACH: ICD-10-PCS | Performed by: OBSTETRICS & GYNECOLOGY

## 2025-07-15 PROCEDURE — 0UT74ZZ RESECTION OF BILATERAL FALLOPIAN TUBES, PERCUTANEOUS ENDOSCOPIC APPROACH: ICD-10-PCS | Performed by: OBSTETRICS & GYNECOLOGY

## 2025-07-15 PROCEDURE — 250N000011 HC RX IP 250 OP 636: Performed by: STUDENT IN AN ORGANIZED HEALTH CARE EDUCATION/TRAINING PROGRAM

## 2025-07-15 PROCEDURE — 258N000001 HC RX 258: Performed by: OBSTETRICS & GYNECOLOGY

## 2025-07-15 RX ORDER — AMOXICILLIN 250 MG
2 CAPSULE ORAL 2 TIMES DAILY
Status: DISCONTINUED | OUTPATIENT
Start: 2025-07-15 | End: 2025-07-17 | Stop reason: HOSPADM

## 2025-07-15 RX ORDER — DEXAMETHASONE SODIUM PHOSPHATE 4 MG/ML
4 INJECTION, SOLUTION INTRA-ARTICULAR; INTRALESIONAL; INTRAMUSCULAR; INTRAVENOUS; SOFT TISSUE
Status: DISCONTINUED | OUTPATIENT
Start: 2025-07-15 | End: 2025-07-15 | Stop reason: HOSPADM

## 2025-07-15 RX ORDER — IBUPROFEN 600 MG/1
600 TABLET, FILM COATED ORAL EVERY 6 HOURS
Status: DISCONTINUED | OUTPATIENT
Start: 2025-07-15 | End: 2025-07-17 | Stop reason: HOSPADM

## 2025-07-15 RX ORDER — DEXAMETHASONE SODIUM PHOSPHATE 10 MG/ML
INJECTION, SOLUTION INTRAMUSCULAR; INTRAVENOUS PRN
Status: DISCONTINUED | OUTPATIENT
Start: 2025-07-15 | End: 2025-07-15

## 2025-07-15 RX ORDER — FENTANYL CITRATE 50 UG/ML
50 INJECTION, SOLUTION INTRAMUSCULAR; INTRAVENOUS EVERY 5 MIN PRN
Status: DISCONTINUED | OUTPATIENT
Start: 2025-07-15 | End: 2025-07-15 | Stop reason: HOSPADM

## 2025-07-15 RX ORDER — CEFAZOLIN SODIUM/WATER 2 G/20 ML
2 SYRINGE (ML) INTRAVENOUS
Status: COMPLETED | OUTPATIENT
Start: 2025-07-15 | End: 2025-07-15

## 2025-07-15 RX ORDER — CEFAZOLIN SODIUM/WATER 2 G/20 ML
2 SYRINGE (ML) INTRAVENOUS SEE ADMIN INSTRUCTIONS
Status: DISCONTINUED | OUTPATIENT
Start: 2025-07-15 | End: 2025-07-15 | Stop reason: HOSPADM

## 2025-07-15 RX ORDER — HYDROMORPHONE HCL IN WATER/PF 6 MG/30 ML
.2-.5 PATIENT CONTROLLED ANALGESIA SYRINGE INTRAVENOUS
Status: DISCONTINUED | OUTPATIENT
Start: 2025-07-15 | End: 2025-07-17

## 2025-07-15 RX ORDER — SODIUM CHLORIDE, SODIUM LACTATE, POTASSIUM CHLORIDE, CALCIUM CHLORIDE 600; 310; 30; 20 MG/100ML; MG/100ML; MG/100ML; MG/100ML
INJECTION, SOLUTION INTRAVENOUS CONTINUOUS
Status: DISCONTINUED | OUTPATIENT
Start: 2025-07-15 | End: 2025-07-15 | Stop reason: HOSPADM

## 2025-07-15 RX ORDER — OXYCODONE HYDROCHLORIDE 5 MG/1
5 TABLET ORAL EVERY 4 HOURS PRN
Status: DISCONTINUED | OUTPATIENT
Start: 2025-07-15 | End: 2025-07-17 | Stop reason: HOSPADM

## 2025-07-15 RX ORDER — WATER 10 ML/10ML
INJECTION INTRAMUSCULAR; INTRAVENOUS; SUBCUTANEOUS PRN
Status: DISCONTINUED | OUTPATIENT
Start: 2025-07-15 | End: 2025-07-15 | Stop reason: HOSPADM

## 2025-07-15 RX ORDER — FENTANYL CITRATE 50 UG/ML
INJECTION, SOLUTION INTRAMUSCULAR; INTRAVENOUS PRN
Status: DISCONTINUED | OUTPATIENT
Start: 2025-07-15 | End: 2025-07-15

## 2025-07-15 RX ORDER — OXYCODONE HYDROCHLORIDE 5 MG/1
10 TABLET ORAL EVERY 4 HOURS PRN
Status: DISCONTINUED | OUTPATIENT
Start: 2025-07-15 | End: 2025-07-17 | Stop reason: HOSPADM

## 2025-07-15 RX ORDER — NALOXONE HYDROCHLORIDE 1 MG/ML
0.1 INJECTION INTRAMUSCULAR; INTRAVENOUS; SUBCUTANEOUS
Status: DISCONTINUED | OUTPATIENT
Start: 2025-07-15 | End: 2025-07-15 | Stop reason: HOSPADM

## 2025-07-15 RX ORDER — ONDANSETRON 4 MG/1
4 TABLET, ORALLY DISINTEGRATING ORAL EVERY 30 MIN PRN
Status: DISCONTINUED | OUTPATIENT
Start: 2025-07-15 | End: 2025-07-15 | Stop reason: HOSPADM

## 2025-07-15 RX ORDER — ONDANSETRON 2 MG/ML
4 INJECTION INTRAMUSCULAR; INTRAVENOUS EVERY 30 MIN PRN
Status: DISCONTINUED | OUTPATIENT
Start: 2025-07-15 | End: 2025-07-15 | Stop reason: HOSPADM

## 2025-07-15 RX ORDER — FENTANYL CITRATE 50 UG/ML
25 INJECTION, SOLUTION INTRAMUSCULAR; INTRAVENOUS EVERY 5 MIN PRN
Status: DISCONTINUED | OUTPATIENT
Start: 2025-07-15 | End: 2025-07-15 | Stop reason: HOSPADM

## 2025-07-15 RX ORDER — BUPIVACAINE HCL/EPINEPHRINE 0.25-.0005
VIAL (ML) INJECTION PRN
Status: DISCONTINUED | OUTPATIENT
Start: 2025-07-15 | End: 2025-07-15 | Stop reason: HOSPADM

## 2025-07-15 RX ORDER — PROPOFOL 10 MG/ML
INJECTION, EMULSION INTRAVENOUS CONTINUOUS PRN
Status: DISCONTINUED | OUTPATIENT
Start: 2025-07-15 | End: 2025-07-15

## 2025-07-15 RX ORDER — LIDOCAINE 40 MG/G
CREAM TOPICAL
Status: DISCONTINUED | OUTPATIENT
Start: 2025-07-15 | End: 2025-07-15 | Stop reason: HOSPADM

## 2025-07-15 RX ORDER — SIMETHICONE 80 MG
80 TABLET,CHEWABLE ORAL 4 TIMES DAILY PRN
Status: DISCONTINUED | OUTPATIENT
Start: 2025-07-15 | End: 2025-07-17 | Stop reason: HOSPADM

## 2025-07-15 RX ORDER — AMOXICILLIN 250 MG
1 CAPSULE ORAL 2 TIMES DAILY
Status: DISCONTINUED | OUTPATIENT
Start: 2025-07-15 | End: 2025-07-17 | Stop reason: HOSPADM

## 2025-07-15 RX ORDER — METRONIDAZOLE 500 MG/100ML
500 INJECTION, SOLUTION INTRAVENOUS
Status: COMPLETED | OUTPATIENT
Start: 2025-07-15 | End: 2025-07-15

## 2025-07-15 RX ORDER — ACETAMINOPHEN 500 MG
1000 TABLET ORAL EVERY 6 HOURS
Status: DISCONTINUED | OUTPATIENT
Start: 2025-07-15 | End: 2025-07-17 | Stop reason: HOSPADM

## 2025-07-15 RX ORDER — LIDOCAINE 40 MG/G
CREAM TOPICAL
Status: DISCONTINUED | OUTPATIENT
Start: 2025-07-15 | End: 2025-07-17 | Stop reason: HOSPADM

## 2025-07-15 RX ORDER — KETOROLAC TROMETHAMINE 30 MG/ML
INJECTION, SOLUTION INTRAMUSCULAR; INTRAVENOUS PRN
Status: DISCONTINUED | OUTPATIENT
Start: 2025-07-15 | End: 2025-07-15

## 2025-07-15 RX ORDER — INDOCYANINE GREEN AND WATER 25 MG
KIT INJECTION PRN
Status: DISCONTINUED | OUTPATIENT
Start: 2025-07-15 | End: 2025-07-15 | Stop reason: HOSPADM

## 2025-07-15 RX ORDER — HYDROXYZINE HYDROCHLORIDE 25 MG/1
25 TABLET, FILM COATED ORAL EVERY 6 HOURS PRN
Status: DISCONTINUED | OUTPATIENT
Start: 2025-07-15 | End: 2025-07-17 | Stop reason: HOSPADM

## 2025-07-15 RX ADMIN — HYDROMORPHONE HYDROCHLORIDE 0.5 MG: 1 INJECTION, SOLUTION INTRAMUSCULAR; INTRAVENOUS; SUBCUTANEOUS at 17:04

## 2025-07-15 RX ADMIN — POLYETHYLENE GLYCOL 3350 17 G: 17 POWDER, FOR SOLUTION ORAL at 00:03

## 2025-07-15 RX ADMIN — HYDROMORPHONE HYDROCHLORIDE 0.4 MG: 1 INJECTION, SOLUTION INTRAMUSCULAR; INTRAVENOUS; SUBCUTANEOUS at 19:03

## 2025-07-15 RX ADMIN — DEXAMETHASONE SODIUM PHOSPHATE 10 MG: 10 INJECTION, SOLUTION INTRAMUSCULAR; INTRAVENOUS at 15:20

## 2025-07-15 RX ADMIN — SODIUM CHLORIDE 4 MCG: 9 INJECTION, SOLUTION INTRAVENOUS at 17:48

## 2025-07-15 RX ADMIN — ACETAMINOPHEN 1000 MG: 500 TABLET ORAL at 21:13

## 2025-07-15 RX ADMIN — ROCURONIUM 30 MG: 50 INJECTION, SOLUTION INTRAVENOUS at 16:16

## 2025-07-15 RX ADMIN — SODIUM CHLORIDE 8 MCG: 9 INJECTION, SOLUTION INTRAVENOUS at 17:18

## 2025-07-15 RX ADMIN — FENTANYL CITRATE 50 MCG: 50 INJECTION, SOLUTION INTRAMUSCULAR; INTRAVENOUS at 18:39

## 2025-07-15 RX ADMIN — FAMOTIDINE 20 MG: 20 TABLET ORAL at 21:47

## 2025-07-15 RX ADMIN — SODIUM CHLORIDE, SODIUM LACTATE, POTASSIUM CHLORIDE, AND CALCIUM CHLORIDE: .6; .31; .03; .02 INJECTION, SOLUTION INTRAVENOUS at 18:18

## 2025-07-15 RX ADMIN — PROPOFOL 200 MCG/KG/MIN: 10 INJECTION, EMULSION INTRAVENOUS at 15:12

## 2025-07-15 RX ADMIN — PROPOFOL 160 MG: 10 INJECTION, EMULSION INTRAVENOUS at 15:11

## 2025-07-15 RX ADMIN — SODIUM CHLORIDE, SODIUM LACTATE, POTASSIUM CHLORIDE, AND CALCIUM CHLORIDE: .6; .31; .03; .02 INJECTION, SOLUTION INTRAVENOUS at 14:19

## 2025-07-15 RX ADMIN — KETOROLAC TROMETHAMINE 15 MG: 30 INJECTION, SOLUTION INTRAMUSCULAR at 17:14

## 2025-07-15 RX ADMIN — FENTANYL CITRATE 50 MCG: 50 INJECTION, SOLUTION INTRAMUSCULAR; INTRAVENOUS at 18:34

## 2025-07-15 RX ADMIN — FENTANYL CITRATE 100 MCG: 50 INJECTION, SOLUTION INTRAMUSCULAR; INTRAVENOUS at 15:11

## 2025-07-15 RX ADMIN — ROCURONIUM 100 MG: 50 INJECTION, SOLUTION INTRAVENOUS at 15:11

## 2025-07-15 RX ADMIN — INSULIN ASPART 2 UNITS: 100 INJECTION, SOLUTION INTRAVENOUS; SUBCUTANEOUS at 07:52

## 2025-07-15 RX ADMIN — Medication 2 G: at 15:10

## 2025-07-15 RX ADMIN — HYDROMORPHONE HYDROCHLORIDE 0.2 MG: 1 INJECTION, SOLUTION INTRAMUSCULAR; INTRAVENOUS; SUBCUTANEOUS at 19:16

## 2025-07-15 RX ADMIN — HYDROMORPHONE HYDROCHLORIDE 0.4 MG: 1 INJECTION, SOLUTION INTRAMUSCULAR; INTRAVENOUS; SUBCUTANEOUS at 18:55

## 2025-07-15 RX ADMIN — SUGAMMADEX 250 MG: 100 INJECTION, SOLUTION INTRAVENOUS at 17:30

## 2025-07-15 RX ADMIN — METRONIDAZOLE 500 MG: 500 INJECTION, SOLUTION INTRAVENOUS at 13:15

## 2025-07-15 RX ADMIN — MIDAZOLAM HYDROCHLORIDE 2 MG: 1 INJECTION, SOLUTION INTRAMUSCULAR; INTRAVENOUS at 15:04

## 2025-07-15 RX ADMIN — PHENYLEPHRINE HYDROCHLORIDE 100 MCG: 10 INJECTION INTRAVENOUS at 15:36

## 2025-07-15 RX ADMIN — HYDROMORPHONE HYDROCHLORIDE 0.5 MG: 1 INJECTION, SOLUTION INTRAMUSCULAR; INTRAVENOUS; SUBCUTANEOUS at 17:48

## 2025-07-15 RX ADMIN — SODIUM CHLORIDE, SODIUM LACTATE, POTASSIUM CHLORIDE, AND CALCIUM CHLORIDE: .6; .31; .03; .02 INJECTION, SOLUTION INTRAVENOUS at 08:00

## 2025-07-15 RX ADMIN — ONDANSETRON 4 MG: 2 INJECTION INTRAMUSCULAR; INTRAVENOUS at 16:59

## 2025-07-15 RX ADMIN — SODIUM CHLORIDE 8 MCG: 9 INJECTION, SOLUTION INTRAVENOUS at 15:49

## 2025-07-15 RX ADMIN — PHENYLEPHRINE HYDROCHLORIDE 100 MCG: 10 INJECTION INTRAVENOUS at 15:27

## 2025-07-15 RX ADMIN — POLYETHYLENE GLYCOL 3350 17 G: 17 POWDER, FOR SOLUTION ORAL at 21:55

## 2025-07-15 RX ADMIN — HYDROMORPHONE HYDROCHLORIDE 0.5 MG: 0.2 INJECTION, SOLUTION INTRAMUSCULAR; INTRAVENOUS; SUBCUTANEOUS at 23:42

## 2025-07-15 ASSESSMENT — ACTIVITIES OF DAILY LIVING (ADL)
ADLS_ACUITY_SCORE: 45
ADLS_ACUITY_SCORE: 41
ADLS_ACUITY_SCORE: 45
ADLS_ACUITY_SCORE: 41
ADLS_ACUITY_SCORE: 45
ADLS_ACUITY_SCORE: 41
ADLS_ACUITY_SCORE: 41
ADLS_ACUITY_SCORE: 45
ADLS_ACUITY_SCORE: 41
ADLS_ACUITY_SCORE: 45

## 2025-07-15 ASSESSMENT — ENCOUNTER SYMPTOMS: DYSRHYTHMIAS: 0

## 2025-07-15 NOTE — ANESTHESIA PREPROCEDURE EVALUATION
Anesthesia Pre-Procedure Evaluation    Patient: Monica Doshi   MRN: [id number] : 1985          Procedure : Procedure(s):  ROBOTIC ASSISTED TOTAL LAPAROSCOPIC HYSTERECTOMY, BILATERAL SALPINGO OOPHORECTOMY, SENTINEL LYMPH NODE INJECTION AND BIOPSY, MINI-LAPAROTOMY         Past Medical History:   Diagnosis Date    Chlamydia 2013    Diabetes (H)     Endometrial hyperplasia, complex 2016: Endometrial echo 20 mm, thickened.  Pt is bleeding, the measured thickness appears to have vascular flow within, may wish to consider hysteroscopy and/or D&C for optimal evaluation.  : Endometrium, biopsy --  Focal complex hyperplasia without atypia and rare squamous   Morules, No malignancy seen. Some studies report an associated increased risk of concurrent or   future development of e    Gastroesophageal reflux disease     Hepatitis     Infertility counseling 2021    Trying from 1030-6220 without pregnancy. Oligomenorrhea.    Major depressive disorder, recurrent, in full remission 2013    Marijuana dependence (H) 2009    Obese     Secondary oligomenorrhea 2021    PCOS?      Past Surgical History:   Procedure Laterality Date    DILATION AND CURETTAGE, OPERATIVE HYSTEROSCOPY, COMBINED N/A 2024    Procedure: HYSTEROSCOPY WITH BIOPSY OF ENDOMETRIUM AND POLYPECTOMY;  Surgeon: Magali Ramirez DO;  Location: [facility] OR    NO PAST SURGERIES        Allergies   Allergen Reactions    Zoloft      Sharp pains in arms      Social History     Tobacco Use    Smoking status: Every Day     Current packs/day: 0.10     Average packs/day: 0.1 packs/day for 10.0 years (1.0 ttl pk-yrs)     Types: Cigarettes    Smokeless tobacco: Never   Substance Use Topics    Alcohol use: No      Wt Readings from Last 1 Encounters:   25 113.4 kg (250 lb)        Anesthesia Evaluation   Pt has had prior anesthetic. Type: MAC.        ROS/MED HX  ENT/Pulmonary:     (+)                     Intermittent,  asthma               Sleep apnea: Denies; Snores.   Neurologic:       Cardiovascular:    (-) hypertension, CHF and arrhythmias   METS/Exercise Tolerance:     Hematologic:     (+)      anemia,          Musculoskeletal:       GI/Hepatic:     (+) GERD,          hepatitis         Renal/Genitourinary:     (+) renal disease, type: CRI,            Endo:     (+) type I DM,    Using insulin,          Obesity,       Psychiatric/Substance Use:     (+) psychiatric history anxiety and depression   Recreational drug usage: Cannabis.    Infectious Disease:       Malignancy:       Other:              Physical Exam  Airway  Mallampati: III  TM distance: >3 FB  Neck ROM: full    Cardiovascular    Dental  dental implants, bridges or caps present   Pulmonary Breath sounds clear to auscultation        Neurological   She appears awake and oriented x3.    Other Findings Upper dentures; Lower partials      OUTSIDE LABS:  CBC:   Lab Results   Component Value Date    WBC 10.9 07/15/2025    WBC 10.8 07/13/2025    HGB 8.2 (L) 07/15/2025    HGB 8.7 (L) 07/14/2025    HCT 26.3 (L) 07/15/2025    HCT 26.4 (L) 07/13/2025     07/15/2025     07/13/2025     BMP:   Lab Results   Component Value Date     07/15/2025     07/10/2025    POTASSIUM 4.0 07/15/2025    POTASSIUM 4.3 07/14/2025    CHLORIDE 103 07/15/2025    CHLORIDE 105 07/10/2025    CO2 30 (H) 07/15/2025    CO2 25 07/10/2025    BUN 10.7 07/15/2025    BUN 11.3 07/10/2025    CR 0.63 07/15/2025    CR 0.60 07/10/2025     (H) 07/15/2025     (H) 07/15/2025     COAGS:   Lab Results   Component Value Date    INR 1.04 07/09/2025     POC:   Lab Results   Component Value Date    HCG Negative 07/15/2025     HEPATIC:   Lab Results   Component Value Date    ALBUMIN 3.7 07/10/2025    PROTTOTAL 6.3 (L) 07/10/2025    ALT 10 07/10/2025    AST 10 07/10/2025    ALKPHOS 46 07/10/2025    BILITOTAL 0.7 07/10/2025     OTHER:   Lab Results   Component Value Date    LACT 1.2  "07/09/2025    A1C 6.5 (H) 05/10/2024    GABRIELA 9.6 07/15/2025    PHOS 3.4 07/15/2025    MAG 2.2 07/15/2025    TSH 1.62 05/10/2024       Anesthesia Plan    ASA Status:  3      NPO Status: NPO Appropriate   Anesthesia Type: General.  Airway: oral.  Induction: intravenous.  Maintenance: Inhalation.   Techniques and Equipment:       - Monitoring Plan: standard ASA monitoring     Consents    Anesthesia Plan(s) and associated risks, benefits, and realistic alternatives discussed. Questions answered and patient/representative(s) expressed understanding.     - Discussed: anesthesiologist     - Discussed with:  Patient, family        - Pt is DNR/DNI Status: no DNR     Blood Consent:      - Discussed with: patient, family.     Postoperative Care    Pain management: non-narcotic analgesics, plan for postoperative opioid use.     Comments:    Other Comments: Discussed risks of anesthesia (sore throat, hoarseness, dental injury, nausea, possible blood transfusion). All questions answered.               Jessica William MD    I have reviewed the pertinent notes and labs in the chart from the past 30 days and (re)examined the patient.  Any updates or changes from those notes are reflected in this note.    Clinically Significant Risk Factors                              # Morbid Obesity: Estimated body mass index is 42.91 kg/m  as calculated from the following:    Height as of this encounter: 1.626 m (5' 4\").    Weight as of this encounter: 113.4 kg (250 lb).      # Financial/Environmental Concerns: eviction pending (pt states she is behind on rent)  # Asthma: noted on problem list              "

## 2025-07-15 NOTE — PROGRESS NOTES
I have seen and examined the patient. There are no updates or changes to the preoperative history and physical outside of a Hgb of 8.2 g/dl this am. An additional unit of blood was ordered. We will obtain a post-infusion Hgb.     Mary Ellen Isbell MD  Gynecologic Oncology  [facility]: [phone]

## 2025-07-15 NOTE — PROGRESS NOTES
[facility]    Medicine Progress Note - Hospitalist Service    Date of Admission:  7/9/2025    Assessment & Plan   Monica Doshi is a 39-year-old female with history of endometrial cancer, asthma, and GERD was admitted on 7/9/2025 for severe acute blood loss anemia due to worsening vaginal bleeding in the setting of progressive endometrial cancer.     She received 4 units of PRBCs. MRI on 7/11/2025 showed a slight interval decrease in the uterine/cervical mass with persistent bilateral parametrial involvement and suspicious pelvic sidewall lymphadenopathy. GYN oncology performed radical hysterectomy, BSO, and sentinel lymph node dissection on 7/15/2025. Intraoperative findings included a globally enlarged uterus with bilateral tumor implants in the posterior pelvic peritoneum and significant thickening/involvement of the left parametria.    Chest CT on 7/10/2025 revealed bilateral pulmonary nodules (up to 10 mm); pulmonology recommended biopsy, but the initial IR attempt on 7/11/2025 was unsuccessful, and the patient declined repeat biopsy on 7/14/2025, deferring until after surgery. Hepatitis panel returned positive for hepatitis B surface antigen.  Outpatient GI referral placed.        #Acute Blood Loss Anemia  #Endometrial Cancer  CTA A/P showed fluid within the endometrial canal and proximal vaginal canal likely representing blood clots. She was transfused with 3 units PRBCs in the ED. transfused with 4 units of PRBC. GYN oncology performed radical hysterectomy, bilateral salpingo-oophorectomy, bilateral sentinel lymph node for endometrial cancer on 7/15/2025.  Notable operative findings include globally enlarged uterus with bilateral tumor implants in the posterior pelvic peritoneum and substantial thickening and involvement of the left parametria.    -Monitor hemoglobin and transfuse as needed  -Analgesics as needed  -Postoperative surgical management per GYN oncology  - GYN oncology  "[id number] assistance    #Type 2 Diabetes  - Last A1c was 6.5% in 05/2025  - Home medications include metformin, tirzepatide  - Hold home metformin, tirzepatide  - Sliding Scale Insulin with Glucose Checks    Concern for chronic hepatitis  -Follow-up hepatitis panel  -Monitor LFT  Will consider GI/hepatology evaluation depending on results    #Pulmonary Nodules  -Unclear whether this is secondary to metastasis from endometrial cancer  -Incidental finding on CT Abd in the ED  -IR biopsy-performed on 7/11/2025; follow-up biopsy report    #Mood Disorder - Continue PTA Fluoxetine  #Asthma - Continue PTA Inhaler  #GERD - Continue PTA Famotidine/Omeprazole      Diet: Diet  NPO for Procedure/Surgery per Anesthesia Guidelines Except for: Meds; Clear liquids before procedure/surgery: ADULT (Age GREATER than or Equal to 18 years) - Clear liquids 2 hours before procedure/surgery    DVT Prophylaxis: Pneumatic Compression Devices  Navarro Catheter: Not present  Lines: None     Cardiac Monitoring: ACTIVE order. Indication: Procedural area  Code Status: Full Code      Clinically Significant Risk Factors                                # Morbid Obesity: Estimated body mass index is 42.91 kg/m  as calculated from the following:    Height as of this encounter: 1.626 m (5' 4\").    Weight as of this encounter: 113.4 kg (250 lb).      # Financial/Environmental Concerns: eviction pending (pt states she is behind on rent)  # Asthma: noted on problem list        Social Drivers of Health    Food Insecurity: High Risk (7/10/2025)    Food Insecurity     Within the past 12 months, did you worry that your food would run out before you got money to buy more?: Yes     Within the past 12 months, did the food you bought just not last and you didn t have money to get more?: No   Housing Stability: High Risk (7/10/2025)    Housing Stability     Do you have housing? : Yes     Are you worried about losing your housing?: Yes   Tobacco Use: High " Risk (7/15/2025)    Patient History     Smoking Tobacco Use: Every Day     Smokeless Tobacco Use: Never   Social Connections: Unknown (5/10/2024)    Social Connection and Isolation Panel [NHANES]     Frequency of Social Gatherings with Friends and Family: Once a week          Disposition Plan     Medically Ready for Discharge: Anticipated in 2-4 Days      Jannette Macias MD  Hospitalist Service  [facility]  Securely message with [employer] (more info)  Text page via [employer] Paging/Directory   ______________________________________________________________________    Interval History   No new complaints today and no acute events overnight.  Hemoglobin is stable.  Awaiting planned GYN surgery.  Physical Exam   Vital Signs: Temp: 98  F (36.7  C) Temp src: Oral BP: 124/61 Pulse: 83   Resp: 18 SpO2: 96 % O2 Device: None (Room air)    Weight: 250 lbs 0 oz    General appearance: Obese, pale, awake, Alert, Cooperative, not in any obvious distress and appears stated age   HEENT: Normocephalic, atraumatic, conjunctiva clear without icterus and ears without discharge  Lungs: Clear to auscultation bilaterally, no wheezing, good air exchange, normal work of breathing  Cardiovascular: Regular Rate and Rythm, normal apical impulse, normal S1 and S2, no lower extremity edema bilaterally  Abdomen: Soft, non-tender and Non-distended, active bowel sounds  Skin: Skin color, texture normal and bruising or bleeding. No rashes or lesions over face, neck, arms and legs, turgor normal.  Musculoskeletal: No bony deformities or joint tenderness. Normal ROM upon flexion & extension.   Neurologic: Alert & Oriented X 3, Facial symmetry preserved and upper & lower extremities moving well with symmetry  Psychiatric: Calm, normal eye contact and normal affect      Medical Decision Making       55 MINUTES SPENT BY ME on the date of service doing chart review, history, exam, documentation & further activities per the note.       Data

## 2025-07-15 NOTE — ANESTHESIA POSTPROCEDURE EVALUATION
Patient: Monica Doshi    Procedure: Procedure(s):  ROBOTIC ASSISTED MODIFIED RADICAL LAPAROSCOPIC HYSTERECTOMY, BILATERAL SALPINGO OOPHORECTOMY, SENTINEL LYMPH NODE INJECTION AND BIOPSY, MINI-LAPAROTOMY       Anesthesia Type:  General    Note:  Disposition: Inpatient   Postop Pain Control:             Sign Out: Well controlled pain   PONV: No   Neuro/Psych: Uneventful            Sign Out: Acceptable/Baseline neuro status   Airway/Respiratory: Uneventful            Sign Out: Acceptable/Baseline resp. status   CV/Hemodynamics: Uneventful            Sign Out: Acceptable CV status; No obvious hypovolemia; No obvious fluid overload   Other NRE: NONE   DID A NON-ROUTINE EVENT OCCUR? No           Last vitals:  Vitals Value Taken Time   /95 07/15/25 18:16   Temp 36.7  C (98.1  F) 07/15/25 18:00   Pulse 98 07/15/25 18:23   Resp 23 07/15/25 18:23   SpO2 94 % 07/15/25 18:23   Vitals shown include unfiled device data.    Electronically Signed By: Ortiz Underwood MD  July 15, 2025  6:25 PM

## 2025-07-15 NOTE — ANESTHESIA PROCEDURE NOTES
Airway       Patient location during procedure: OR       Procedure Start/Stop Times: 7/15/2025 3:15 PM  Staff -        Other Anesthesia Staff: Kathy Blunt       Performed By: SRNAIndications and Patient Condition       Indications for airway management: mini-procedural       Induction type:intravenous       Mask difficulty assessment: 0 - not attempted    Final Airway Details       Final airway type: endotracheal airway       Successful airway: ETT - single  Endotracheal Airway Details        ETT size (mm): 7.0       Cuffed: yes       Cuff volume (mL): 8       Successful intubation technique: video laryngoscopy       VL Blade Size: Glidescope 3       Grade View of Cords: 1       Adjucts: stylet       Position: Center       Measured from: gums/teeth       Secured at (cm): 21       Bite block used: None    Post intubation assessment        Placement verified by: capnometry and equal breath sounds        Number of attempts at approach: 1       Number of other approaches attempted: 0       Secured with: tape       Ease of procedure: easy       Dentition: Intact and Unchanged    Medication(s) Administered   Medication Administration Time: 7/15/2025 3:15 PM

## 2025-07-15 NOTE — PROGRESS NOTES
"SPIRITUAL HEALTH SERVICES NOTE  [facility]; P2    Reason for Visit: follow-up    SPIRITUAL ASSESSMENT  Feels ready for surgery  Trusting that God has more plans for her life    Patient/Family Understanding of Illness and Goals of Care - Follow-up visit. Met with Monica and a friend. She shares that she really wanted to leave over the weekend, but that Dr. Isbell and her team encouraged and convinced Monica to stay. When asked how she feels about surgery she says \"I'm ready! It has caused me so much that I am ready to be done with it now.... I realized that if I don't have the surgery it could kill me.\" Monica denies any concerns about the procedure itself, stating that she feels confident in Dr. Isbell and her team's care.     Distress and Loss - Not directly discussed. Monica notes that she understands how important it is to take her uterus out. Previously this was a source of grief and loss. Today she seems much more comfortable with the idea.     Strengths, Coping, and Resources - Monica's friend is here to support her today. Monica also expressed gratitude for Dr. Isbell and her team and the person-centered care they provided over the weekend. She says \"You know I lost both of my parents and don't have anyone in my family to support me, but I feel like God has just provided all these people to take care of me and I'm so thankful for that.\"    Meaning, Beliefs, and Spirituality - Monica says \"At first I didn't want my uterus taken out, but now I'm like  - take it all, take my ovaries too. I feel like God must have plans for me that don't include my uterus, but I still want to live. Who knows how He could use me?\" I affirmed her long-term perspective and choice to be hopeful and optimistic. Monica welcomed a prayer.     Plan of Care: Will plan to follow-up with Monica tomorrow.     Mary Ellen Briones M.Div.    Office: [phone] (for non-urgent requests)  Please Vocera or page through [facility] for time-sensitive " requests

## 2025-07-15 NOTE — PROCEDURES
OPERATIVE NOTE    Preoperative Diagnosis:  Endometrial cancer (H) [C54.1]  Progression of disease  Acute blood loss anemia  Extension to the left parametria  Possible left adnexal involvement    Postoperative Diagnosis:  Same    CLINICAL STAGE: T: 3b N: 1 M: X   FIGO: 3C1    NATIONAL GUIDELINE REFERENCED FOR TREATMENT PLANNING:NCCN    Procedures:  Robotic assisted modified radical hysterectomy  Bilateral salpingo-oophorectomy  [place] Lymph Node ICG Injection and Biopsy  Mini laparotomy  Bilateral pelvic lymph node dissections    Prosthetic Devices:  None    Surgeon(s) and Assistants (if any):  Surgeon(s):  Mary Ellen Umanzor MD Barkman, Chinyere Orozco PA-C  Circulator: Boecker, Susan M, RN; Elvis Smith RN  Relief Scrub: Kanchan Davis  Scrub Person: Susana Joseph    Anesthesia:  General    Drains:  none    Specimens:   Uterus, cervix, bilateral fallopian tubes and ovaries  Pelvic washings  [place] lymph node #1 right external iliac artery  Right common iliac artery lymph node   [place] lymph node #1 left external iliac vein  Left external iliac artery lymph node  Left external iliac artery lymph node b  Badder serosa    Complications: none    Findings/Conclusions:      Smooth peritoneal cavity, normal-appearing bilateral diaphragms, liver capsule, omentum.  Substantial mesenteric adiposity.  Globally enlarged uterus.  Bilateral normal-appearing ovaries and fallopian tubes.  Bilateral tumoral implants of the bilateral posterior pelvic peritoneum near the uterosacral ligaments approaching the bilateral parametria.  Substantial thickening and involvement of the left parametria.  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  She subsequently  underwent placement of a Mirena IUD in August 2024 after a pelvic MRI identified a 4.1 cm endometrial confined tumor.  The patient was subsequently lost to follow-up and represented in the spring 2025 with acute onset heavy menstrual bleeding with significant anemia.  She subsequently underwent a pelvic MRI on May 30, 2025 identifying extension of disease into the left parametria and additionally further extension into the left lateral fundus.  The patient was counseled to proceed with a hysterectomy for definitive management of her progressive tumor despite placement of the Mirena IUD.  The patient was lost to follow-up yet again stating she struggled with the ability to cope with paying rent, losing her place of residence, not having family locally and the thought of definitive infertility.  Unfortunately, the patient presented to [facility] July 9, 2025 with significant anemia to a hemoglobin of 5.7 grams per deciliter.  A CT scan of the abdomen and pelvis identified fluid within the endometrial canal and within the vaginal vault likely representing local blood clots however, per the angiogram, there was no evidence of active bleeding.  A CT scan of the chest identified 10 mm bilateral pulmonary nodules.  The patient was resuscitated with 2 units of packed red blood cells to hemoglobin of 7.5 g/dL.  Following her transfusion, she felt well enough that she desired to discharge to home.  She stated she was being evicted from her home and needed to leave.  Following discussion, the patient decided to remain in house and subsequently continued to struggle with continued vaginal bleeding and progressive anemia.  The patient was seen and examined.  She was counseled again in regards to the standard of care surgical procedure.  The risks, benefits and alternatives were discussed in detail.  The consent was signed in the preoperative area.  The patient was subsequently brought to the operating room where general  anesthesia was found to be adequate. She was prepared and draped in the normal sterile fashion in lithotomy positioning. Her arms were padded and tucked at her sides with arm boards.  A briefing and timeout were performed and the staff member were educated as the planned procedure.     At the start of the case, the cervix was grasped with a single toothed tenaculum and injected at 3 and 9 o'clock with 1 ml of a 1.5 mg/ml concentration of ICG 1 cm deep and additionally submucosally.  There was no visible tumor at the ectocervix.  A total of 4 ml was used. A stitch was not placed across the anterior lip of the cervix.  Due to the possibility of requiring a mini laparotomy for delivery of the uterus.  The small V-care uterine manipulator was placed without difficulty. The suture was tied to the uterine manipulator. Attention was then turned to the abdomen.      A supraumbilical incision was made roughly 27 cm above the pubic symphysis. The 5 mm laparoscopic camera was introduced into the abdomen with a robotic 8 mm obturator and sleeve. Following confirmation of entrance into the abdomen by visual inspection of the intraperitoneal space, the abdomen was insufflated to 15 mm Hg. The patient was placed in steep trendelenberg. Additional port sites were mapped out 11 cm lateral with a 15 degree drop to each side of the camera port site. The right lower quadrant port site for assistant manipulation was placed 2 cm cephalad and medial to the ASIS. The assist port site was an 8 mm Airseal trochar. The left lower quadrant port site was mapped out an additional 11 cm lateral to the mid-left port site. A survey of the abdomen and pelvis was performed. The bowel was folded into the upper abdomen. The robot was then docked.      The para-rectal and para-vesical spaces were opened and developed. The right round ligament was grasped, and transected with monopolar energy. The peritoneum was divided just parallel and lateral to the  gonadal vessels. By retracting the posterior leaf medially, the ureter was identified coursing deep within the posterior leaf of the broad ligament, and the para-rectal space was developed between the ureter medially, and the hypogastric artery laterally. The paravesical space on the right was developed by dissecting just lateral to the obliterated umbilical ligament. Attention was turned to identifying the sentinel lymph node, and the infra-red firefly camera was turned on.  The firefly camera failed to identify true ICG green within the retroperitoneal space.  However, the lymphatics were identified on the right coursing directly to the first sentinel lymph node ending over the right external iliac artery.  The lymph node was individually dissected and subsequently delivered with a grasper through the right lower quadrant assistant trocar site.  While following the lymphatics, a second sentinel lymph node was identified right lateral to the right common iliac artery.  This individual lymph node was dissected with great care while dissecting the peritoneum just parallel to the gonadal vessels.  This individual lymph node was additionally collected with a grasper and sent as the right common iliac lymph node.  Attention was turned to the patient's left pelvic sidewall. The left pelvic peritoneum was opened and developed in a similar fashion to the right. The left pararectal and paravesical spaces were opened and developed in a similar fashion. The ureter was identified within its usual position. The firefly camera was activated and the first sentinel lymph node was identified over the left external iliac vein.  This individual lymph node was dissected without any issue and collected with a grasper and delivered through the right lower quadrant assistant trocar site.  In a similar fashion, along the left pelvic sidewall, there was no ICG dye truly identified.  The lymphatics were followed in a similar fashion to the  right.  Second lymph nodes within the chain were along the left external iliac artery.  These lymph nodes were dissected with the associated lymphatics.  There was strong concern for local metastatic disease within the lymph nodes.  Following complete dissection of both left sentinel lymph nodes, each was collected with a grasper and sent separately through the right lower quadrant assistant trocar site.       Attention was then turned to the hysterectomy portion of the case, which was initiated by visualizing the ureter again along the posterior medial leaf on the left. The gray space bound by the IP ligament, the utero-ovarian ligament and the ureter was then opened sharply. The IP ligament with the gonadal vessels was identified and skeletonized. The gonadal vessels were then cauterized with the bipolar grasper and transected. The posterior leaf was taken down to the uterosacral ligament posteriorly.  During this time, it became more and more apparent that there was extension into the left parametria as described with associated local fullness concerning for disease involvement.  The ureter was ruled posterior and inferior in order to completely coagulate, seal and transect the uterine vessels bilaterally.  The uterine vessels were taken below the cervical just vaginal junction in order to completely dissect local disease.  The ureter was rolled inferior and posterior in order to protect the ureteral vessels and serosa from local electrocautery.  Following, attention was turned to the right parametria.  In a similar fashion, the ureter was rolled inferior in order to completely dissected the right parametria, the right posterior pelvic peritoneum.  There were sessile implants of the bilateral posterior pelvic peritoneum approximating the uterosacral ligament.  Again, the parametrium was dissected.  Attention was turned to the right gonadal vessels. In a similar fashion, the gray space was developed and the  gonadal vessels skeletonized. The gonadal vessels were coagulated, sealed and transected in a similar fashion. The posterior leaf was similarly dissected to skeletonize the uterine vessels posteriorly. Attention was then turned anterior. The anterior leaf was dissected to initiate the bladder flap.The bladder flap was developed by dissecting the vesicouterine peritoneum, and following, the pubocervical fascia was dissected over the anterior cervix to fully expose the anterior colpotomy ring.  At this time, there was a lesion of the bladder serosa on the left which was noted.  This was dissected away from the underlying bladder tissue itself.  This was subsequently sent as bladder serosa.  Further dissection occurred along the bilateral uterine vessel region while dissecting below any fullness associated with the parametria.  The cardinal ligaments were coagulated, sealed and transected just parallel to the uterine cervix.  The ureters were bilaterally rolled inferior.  The colpotomy was initiated and circumferentially completed without difficulty. Following, the uterus, bilateral fallopian tubes and ovaries were delivered into a 15 mm Endo Catch bag and allowed to remain within the patient's abdomen.  The string was cut.  The vagina was then closed with a Stratafix 0-PDS barbed unidirectional suture. The specimens were all sent for routine processing.  The pelvis was copiously irrigated.  All surgical sites were found to be hemostatic.  The bilateral ureters were inspected and found to vermiculate without any issue.  The hemostatic agent, Surgicel snow was placed across the patient's pelvis.      A mini-pfannenstiel incision was mapped out 2 cm cephalad to the pubic symphisis. A 5 cm incision was incised with the Bovie on cut and carried through the subcutaneous layer with the bovie. The fascia was cleared and incised at the midline with the bovie. The fascia was extended to the full length of the incision. The  rectus muscles were then divided at the midline digitally, and the peritoneum entered bluntly with pressure placed cephalad. The peritoneum was then extended to the full length of the incision by gentle pull.  The Endo Catch bag strings were identified, grasped and pulled to the incision.  The following complete delivery of all the bag contents, the suprapubic incision was closed with a running suture of 0-vicryl within the fascial layer.  Subcutaneous tissues were reapproximated with a running subcutaneous suture of 2-0 Vicryl.  The skin incision was re-approximated with running 3-0 monocryl in a subcuticular fashion.     All robotic instruments were removed from the patient's abdomen, and the robot was un-docked.  The patient was taken out of Trendelenburg.  The skin incisions were re-approximated with a 3-0 subcuticular stitch followed by an overlying layer of dermabond. All sites were injected with 0.25% marcaine.     The vagina was inspected and found to be hemostatic. All sponges, needles and instrument counts were correct x2. She was taken to the recovery room in a stable condition.      Chinyere Bruno PA-C  assisted me throughout the case and was paramount in the completion of all vital portions. She assisted with injection of the cervix, placement of the uterine manipulator, retraction, adequate visualization and closure.       Estimated Blood Loss: 40 mL    Condition on discharge from OR:  Satisfactory      Mary Ellen Isbell MD   On Behalf of  Surgeon(s):  Mary Ellen Umanzor MD Barkman, Chinyere Orozco PA-C

## 2025-07-15 NOTE — PLAN OF CARE
Problem: Anemia  Goal: Anemia Symptom Improvement  Outcome: Progressing  Intervention: Monitor and Manage Anemia  Recent Flowsheet Documentation  Taken 7/15/2025 0800 by Bob Landeros RN  Safety Promotion/Fall Prevention:   assistive device/personal items within reach   clutter free environment maintained   increased rounding and observation   increase visualization of patient   lighting adjusted   nonskid shoes/slippers when out of bed   patient and family education   room organization consistent   safety round/check completed     Problem: Pain Acute  Goal: Optimal Pain Control and Function  Outcome: Progressing  Intervention: Prevent or Manage Pain  Recent Flowsheet Documentation  Taken 7/15/2025 0800 by Bob Landeros, RN  Bowel Elimination Promotion:   adequate fluid intake promoted   ambulation promoted   privacy promoted  Medication Review/Management: medications reviewed   Goal Outcome Evaluation:       VSS on RA. A&Ox4. Denies pain. Up indep. IVF infused per order- LR at 100 ml/hr. Friend visited at bedside. Went to [facility] to prep for procedure, has been NPO this shift.

## 2025-07-15 NOTE — ANESTHESIA CARE TRANSFER NOTE
Patient: Monica Doshi    Procedure: Procedure(s):  ROBOTIC ASSISTED MODIFIED RADICAL LAPAROSCOPIC HYSTERECTOMY, BILATERAL SALPINGO OOPHORECTOMY, SENTINEL LYMPH NODE INJECTION AND BIOPSY, MINI-LAPAROTOMY       Diagnosis: Endometrial cancer (H) [C54.1]  Diagnosis Additional Information: No value filed.    Anesthesia Type:   General     Note:    Oropharynx: oropharynx clear of all foreign objects and spontaneously breathing  Level of Consciousness: drowsy  Oxygen Supplementation: face mask  Level of Supplemental Oxygen (L/min / FiO2): 8  Independent Airway: airway patency satisfactory and stable  Dentition: dentition unchanged  Vital Signs Stable: post-procedure vital signs reviewed and stable  Report to RN Given: handoff report given  Patient transferred to: PACU    Handoff Report: Identifed the Patient, Identified the Reponsible Provider, Reviewed the pertinent medical history, Discussed the surgical course, Reviewed Intra-OP anesthesia mangement and issues during anesthesia, Set expectations for post-procedure period and Allowed opportunity for questions and acknowledgement of understanding      Vitals:  Vitals Value Taken Time   /86    Temp 98.4f    Pulse 92    Resp 14    SpO2 96%        Electronically Signed By: MEREDITH Manuel CRNA  July 15, 2025  6:06 PM

## 2025-07-15 NOTE — PLAN OF CARE
Problem: Adult Inpatient Plan of Care  Goal: Optimal Comfort and Wellbeing  Outcome: Progressing  Intervention: Provide Person-Centered Care  Recent Flowsheet Documentation  Taken 7/14/2025 1800 by Sasha Haque, RN  Trust Relationship/Rapport:   care explained   choices provided   Goal Outcome Evaluation:Patient is alert and oriented. Made comfortable in bed with all due medications administered. Had a unit of blood today and no reactions observed. NPO at midnight for surgery tomorrow. Reassurance given to allay anxiety.

## 2025-07-15 NOTE — PLAN OF CARE
Goal Outcome Evaluation:    Problem: Adult Inpatient Plan of Care  Goal: Optimal Comfort and Wellbeing  Outcome: Progressing  Denies pain when asked, up independently to the bathroom, reports some vaginal bleeding overnight.

## 2025-07-15 NOTE — PROCEDURES
[facility] Gynecology Brief Operative Note    Pre-operative diagnosis: Endometrial cancer   Post-operative diagnosis: Same   Procedure: Modified radical hysterectomy, bilateral salpingo-oophorectomy, bilateral sentinel lymph node injection and bilateral biopsies, mini laparotomy   Surgeon: Mary Ellen Isbell MD   Assistant(s): Chinyere Bruno PA-C   Anesthesia: General Endotracheal Anesthesia   Estimated blood loss: less than 50ml   Total IV fluids: (See anesthesia record)  1000ml   Blood transfusion: No transfusion was given during surgery   Total urine output: (See anesthesia record)  300ml   Drains: None   Specimens: Uterus, cervix, bilateral fallopian tubes and ovaries  Pelvic washings  Monson lymph node #1 right external iliac artery  Right common iliac artery lymph node   [place]onson lymph node #1 left external iliac vein  Left external iliac artery lymph node  Left external iliac artery lymph node b  Badder serosa     Findings: Smooth peritoneal cavity, normal-appearing bilateral diaphragms, liver capsule, omentum.  Substantial mesenteric adiposity.  Globally enlarged uterus.  Bilateral normal-appearing ovaries and fallopian tubes.  Bilateral tumoral implants of the bilateral posterior pelvic peritoneum near the uterosacral ligaments approaching the bilateral parametria.  Substantial thickening and involvement of the left parametria.  Sessile nodule of uncertain etiology involving the left bladder serosa approximating the left parametria.   Complications: None   Condition: Stable   Comments: See dictated operative report for full details

## 2025-07-16 LAB
GLUCOSE BLDC GLUCOMTR-MCNC: 212 MG/DL (ref 70–99)
GLUCOSE BLDC GLUCOMTR-MCNC: 229 MG/DL (ref 70–99)
GLUCOSE BLDC GLUCOMTR-MCNC: 233 MG/DL (ref 70–99)
GLUCOSE BLDC GLUCOMTR-MCNC: 324 MG/DL (ref 70–99)
GLUCOSE BLDC GLUCOMTR-MCNC: 345 MG/DL (ref 70–99)
HGB BLD-MCNC: 8.1 G/DL (ref 11.7–15.7)
HGB BLD-MCNC: 8.4 G/DL (ref 11.7–15.7)
MAGNESIUM SERPL-MCNC: 2.2 MG/DL (ref 1.7–2.3)
MCV RBC AUTO: 76 FL (ref 78–100)
MCV RBC AUTO: 77 FL (ref 78–100)
PATH REPORT.COMMENTS IMP SPEC: NORMAL
PATH REPORT.FINAL DX SPEC: NORMAL
PATH REPORT.GROSS SPEC: NORMAL
PATH REPORT.RELEVANT HX SPEC: NORMAL
PHOSPHATE SERPL-MCNC: 2.7 MG/DL (ref 2.5–4.5)
POTASSIUM SERPL-SCNC: 4.1 MMOL/L (ref 3.4–5.3)

## 2025-07-16 PROCEDURE — 99233 SBSQ HOSP IP/OBS HIGH 50: CPT | Performed by: INTERNAL MEDICINE

## 2025-07-16 PROCEDURE — 250N000011 HC RX IP 250 OP 636: Performed by: PHYSICIAN ASSISTANT

## 2025-07-16 PROCEDURE — 250N000013 HC RX MED GY IP 250 OP 250 PS 637: Performed by: PHYSICIAN ASSISTANT

## 2025-07-16 PROCEDURE — 36415 COLL VENOUS BLD VENIPUNCTURE: CPT | Performed by: PHYSICIAN ASSISTANT

## 2025-07-16 PROCEDURE — 84100 ASSAY OF PHOSPHORUS: CPT | Performed by: INTERNAL MEDICINE

## 2025-07-16 PROCEDURE — 85018 HEMOGLOBIN: CPT | Performed by: PHYSICIAN ASSISTANT

## 2025-07-16 PROCEDURE — 83735 ASSAY OF MAGNESIUM: CPT | Performed by: INTERNAL MEDICINE

## 2025-07-16 PROCEDURE — 250N000013 HC RX MED GY IP 250 OP 250 PS 637: Performed by: INTERNAL MEDICINE

## 2025-07-16 PROCEDURE — 84132 ASSAY OF SERUM POTASSIUM: CPT | Performed by: INTERNAL MEDICINE

## 2025-07-16 PROCEDURE — 120N000001 HC R&B MED SURG/OB

## 2025-07-16 PROCEDURE — 250N000013 HC RX MED GY IP 250 OP 250 PS 637: Performed by: STUDENT IN AN ORGANIZED HEALTH CARE EDUCATION/TRAINING PROGRAM

## 2025-07-16 RX ORDER — METFORMIN HYDROCHLORIDE 500 MG/1
500 TABLET, EXTENDED RELEASE ORAL
Status: DISCONTINUED | OUTPATIENT
Start: 2025-07-16 | End: 2025-07-17 | Stop reason: HOSPADM

## 2025-07-16 RX ORDER — POLYETHYLENE GLYCOL 3350 17 G/17G
17 POWDER, FOR SOLUTION ORAL DAILY
Status: DISCONTINUED | OUTPATIENT
Start: 2025-07-16 | End: 2025-07-17 | Stop reason: HOSPADM

## 2025-07-16 RX ORDER — BISACODYL 10 MG
10 SUPPOSITORY, RECTAL RECTAL DAILY PRN
Status: DISCONTINUED | OUTPATIENT
Start: 2025-07-16 | End: 2025-07-17 | Stop reason: HOSPADM

## 2025-07-16 RX ADMIN — HYDROMORPHONE HYDROCHLORIDE 0.4 MG: 0.2 INJECTION, SOLUTION INTRAMUSCULAR; INTRAVENOUS; SUBCUTANEOUS at 08:35

## 2025-07-16 RX ADMIN — HYDROMORPHONE HYDROCHLORIDE 0.5 MG: 0.2 INJECTION, SOLUTION INTRAMUSCULAR; INTRAVENOUS; SUBCUTANEOUS at 06:20

## 2025-07-16 RX ADMIN — Medication 1000 MG: at 08:42

## 2025-07-16 RX ADMIN — ACETAMINOPHEN 1000 MG: 500 TABLET ORAL at 14:59

## 2025-07-16 RX ADMIN — TRAZODONE HYDROCHLORIDE 100 MG: 50 TABLET ORAL at 00:52

## 2025-07-16 RX ADMIN — ACETAMINOPHEN 1000 MG: 500 TABLET ORAL at 08:39

## 2025-07-16 RX ADMIN — FLUTICASONE PROPIONATE 2 SPRAY: 50 SPRAY, METERED NASAL at 08:41

## 2025-07-16 RX ADMIN — Medication 100 MCG: at 08:39

## 2025-07-16 RX ADMIN — HYDROMORPHONE HYDROCHLORIDE 0.4 MG: 0.2 INJECTION, SOLUTION INTRAMUSCULAR; INTRAVENOUS; SUBCUTANEOUS at 11:35

## 2025-07-16 RX ADMIN — HYDROMORPHONE HYDROCHLORIDE 0.3 MG: 0.2 INJECTION, SOLUTION INTRAMUSCULAR; INTRAVENOUS; SUBCUTANEOUS at 23:59

## 2025-07-16 RX ADMIN — FLUOXETINE HYDROCHLORIDE 40 MG: 20 CAPSULE ORAL at 08:41

## 2025-07-16 RX ADMIN — POLYETHYLENE GLYCOL 3350 17 G: 17 POWDER, FOR SOLUTION ORAL at 11:36

## 2025-07-16 RX ADMIN — FERROUS GLUCONATE 324 MG: 324 TABLET ORAL at 08:40

## 2025-07-16 RX ADMIN — ACETAMINOPHEN 1000 MG: 500 TABLET ORAL at 21:14

## 2025-07-16 RX ADMIN — INSULIN ASPART 2 UNITS: 100 INJECTION, SOLUTION INTRAVENOUS; SUBCUTANEOUS at 13:16

## 2025-07-16 RX ADMIN — CETIRIZINE HYDROCHLORIDE 10 MG: 10 TABLET, FILM COATED ORAL at 08:40

## 2025-07-16 RX ADMIN — TRAZODONE HYDROCHLORIDE 100 MG: 50 TABLET ORAL at 21:14

## 2025-07-16 RX ADMIN — INSULIN ASPART 2 UNITS: 100 INJECTION, SOLUTION INTRAVENOUS; SUBCUTANEOUS at 16:33

## 2025-07-16 RX ADMIN — FAMOTIDINE 20 MG: 20 TABLET ORAL at 08:40

## 2025-07-16 RX ADMIN — POLYETHYLENE GLYCOL 3350 17 G: 17 POWDER, FOR SOLUTION ORAL at 19:11

## 2025-07-16 RX ADMIN — OXYCODONE HYDROCHLORIDE 10 MG: 5 TABLET ORAL at 00:52

## 2025-07-16 RX ADMIN — SENNOSIDES AND DOCUSATE SODIUM 2 TABLET: 50; 8.6 TABLET ORAL at 08:40

## 2025-07-16 RX ADMIN — SENNOSIDES AND DOCUSATE SODIUM 2 TABLET: 50; 8.6 TABLET ORAL at 20:33

## 2025-07-16 RX ADMIN — OXYCODONE HYDROCHLORIDE 10 MG: 5 TABLET ORAL at 08:40

## 2025-07-16 RX ADMIN — INSULIN ASPART 2 UNITS: 100 INJECTION, SOLUTION INTRAVENOUS; SUBCUTANEOUS at 08:41

## 2025-07-16 RX ADMIN — METFORMIN ER 500 MG 500 MG: 500 TABLET ORAL at 16:33

## 2025-07-16 RX ADMIN — FAMOTIDINE 20 MG: 20 TABLET ORAL at 20:37

## 2025-07-16 RX ADMIN — OXYCODONE HYDROCHLORIDE 5 MG: 5 TABLET ORAL at 19:11

## 2025-07-16 RX ADMIN — OXYCODONE HYDROCHLORIDE 10 MG: 5 TABLET ORAL at 14:59

## 2025-07-16 RX ADMIN — HYDROMORPHONE HYDROCHLORIDE 0.4 MG: 0.2 INJECTION, SOLUTION INTRAMUSCULAR; INTRAVENOUS; SUBCUTANEOUS at 14:59

## 2025-07-16 RX ADMIN — PANTOPRAZOLE SODIUM 40 MG: 40 TABLET, DELAYED RELEASE ORAL at 06:58

## 2025-07-16 RX ADMIN — HYDROMORPHONE HYDROCHLORIDE 0.4 MG: 0.2 INJECTION, SOLUTION INTRAMUSCULAR; INTRAVENOUS; SUBCUTANEOUS at 19:10

## 2025-07-16 RX ADMIN — HYDROMORPHONE HYDROCHLORIDE 0.5 MG: 0.2 INJECTION, SOLUTION INTRAMUSCULAR; INTRAVENOUS; SUBCUTANEOUS at 03:15

## 2025-07-16 RX ADMIN — ACETAMINOPHEN 1000 MG: 500 TABLET ORAL at 02:59

## 2025-07-16 ASSESSMENT — ACTIVITIES OF DAILY LIVING (ADL)
ADLS_ACUITY_SCORE: 41
ADLS_ACUITY_SCORE: 43
ADLS_ACUITY_SCORE: 41
ADLS_ACUITY_SCORE: 41
ADLS_ACUITY_SCORE: 43
ADLS_ACUITY_SCORE: 41
ADLS_ACUITY_SCORE: 43
ADLS_ACUITY_SCORE: 41
ADLS_ACUITY_SCORE: 41
ADLS_ACUITY_SCORE: 43
ADLS_ACUITY_SCORE: 41
ADLS_ACUITY_SCORE: 43
ADLS_ACUITY_SCORE: 41
ADLS_ACUITY_SCORE: 43
ADLS_ACUITY_SCORE: 43
ADLS_ACUITY_SCORE: 41
ADLS_ACUITY_SCORE: 43
ADLS_ACUITY_SCORE: 41
ADLS_ACUITY_SCORE: 41

## 2025-07-16 NOTE — PLAN OF CARE
Problem: Adult Inpatient Plan of Care  Goal: Plan of Care Review  Description: The Plan of Care Review/Shift note should be completed every shift.  The Outcome Evaluation is a brief statement about your assessment that the patient is improving, declining, or no change.  This information will be displayed automatically on your shift  note.  Outcome: Progressing  Flowsheets (Taken 7/16/2025 0235)  Plan of Care Reviewed With: patient  Overall Patient Progress: no change     Problem: Surgery Nonspecified  Goal: Optimal Pain Control and Function  Outcome: Progressing  Intervention: Prevent or Manage Pain  Recent Flowsheet Documentation  Taken 7/16/2025 0052 by Max Neff RN  Pain Management Interventions: medication (see MAR)     Problem: Comorbidity Management  Goal: Blood Glucose Levels Within Targeted Range  Outcome: Progressing  Intervention: Monitor and Manage Glycemia  Recent Flowsheet Documentation  Taken 7/16/2025 0052 by Max Neff RN  Medication Review/Management: medications reviewed   Goal Outcome Evaluation:      Plan of Care Reviewed With: patient    Overall Patient Progress: no changeOverall Patient Progress: no change    Resume care [id number]    Pt is ao. Pt reported abdominal pain 7/10, prn oxycodone and dilaudid given with relief. Heat applied. Abdominal binder on for comfort. Lab sites are clean, dry and intact. Blood sugar was 345. No acute changes overnight.    Shantelle Neff RN.

## 2025-07-16 NOTE — PROGRESS NOTES
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 She is s/p Mirena IUD insertion 8/20/24 and thereafter failed to follow up. She has now been seen in an urgent, acute fashion with heavy vaginal bleeding and severe anemia on multiple occasions with evidence for progressive disease. She was recommended surgical management in May 2025 as an outpatient and failed to follow through with surgical planning.   She underwent Robotic assisted modified radical hysterectomy, Bilateral salpingo-oophorectomy, [place] Lymph Node ICG Injection and Biopsy, Mini laparotomy, Bilateral pelvic lymph node dissections on 7/15/25.    Subjective: Monica is doing well. Reports lower abdominal pain, gets relief with IV dilaudid. Denies n/v, tolerated low carb diet this morning. Initially, she was unable to void, required straight cath x 1, pt now voiding without difficulty. Passing flatus. Would like to go outside for a cigarette. She would like to proceed with a lung bx prior to discharge.      EXAM:  Intake/Output Summary (Last 24 hours) at 7/11/2025 1051  Last data filed at 7/10/2025 2050  Gross per 24 hour   Intake 870 ml   Output --   Net 870 ml        Vitals: /77 (BP Location: Right arm)   Pulse 83   Temp 98.6  F (37  C) (Oral)   Resp 18   Ht 1.626 m (5' 4\")   Wt 114.9 kg (253 lb 4.8 oz)   SpO2 98%   BMI 43.48 kg/m    BMI= Body mass index is 43.48 kg/m .     GENERAL: alert, NAD; morbidly obese  Head: Normocephalic.  Heart: RRR without murmurs, rubs, or gallops.   Respiratory: Clear to auscultation bilaterally. Normal respiratory effort.  Abdomen: Soft and appropriately tender across " lower abdomen, without guarding or rebound. Laparoscopic incisions x 5 and mini laparotomy incision clean, dry, intact.  Covered with Dermabond. Surrounding mild ecchymosis.  Lower extremities: No edema bilaterally.  Neuro: CN 2-12 grossly intact.  Skin: without rashes or jaundice.   Psych: alert and oriented x 3. Appropriate mood and affect.    LABS  Recent Labs   Lab Test 07/16/25  0604 07/15/25  0549 07/14/25  0632 07/13/25  0743   WBC  --  10.9  --  10.8   RBC  --  3.38*  --  3.37*   HGB 8.1* 8.2*   < > 8.0*   HCT  --  26.3*  --  26.4*   MCV 77* 78   < > 78   MCH  --  24.3*  --  23.7*   MCHC  --  31.2*  --  30.3*   PLT  --  272  --  298    < > = values in this interval not displayed.     Recent Labs   Lab Test 07/16/25  0604 07/15/25  0549 07/11/25  0657 07/10/25  0601   POTASSIUM 4.1 4.0   < > 4.3   CHLORIDE  --  103  --  105   BUN  --  10.7  --  11.3    < > = values in this interval not displayed.      Recent Labs   Lab Test 07/10/25  0601 02/22/24  1625 10/07/22  0907   PROTEIN  --   --  30*   BILITOTAL 0.7 0.4  --    AST 10 18  --    ALT 10 27  --        ASSESSMENT  39 year old female with endometrial cancer, diagnosed back in May 2024. Mirena IUD placed and patient was lost to follow up due to lapse in insurance.  Recently seen as outpatient with imaging concerning for disease progression. Now admitted for profound anemia from worsening vaginal bleeding. Bilateral pulmonary nodules idenitifed on chest imaging.   POD 1, s/p RA modified radical hysterectomy, BSO, SLN INJ/BX, mini-laparotomy, bilateral pelvic lymph node dissections.     Endometrial cancer, stage 3C1:   Concern for progression on 5/30/25 MRI scan.  Repeat MRI 7/11/2025 without significant progression of disease.  Concern for possible left parametrial involvement.  Left ovarian involvement.  s/p RA modified radical hysterectomy, BSO, SLN INJ/BX, mini-laparotomy, bilateral pelvic lymph node dissections. Pathology pending.    Post-operative  status  Continue routine postop cares, overall pt doing well. Reviewed procedure and intraoperative findings in detail with patient.  Oncology: Final pathology pending.   Pain: Continue scheduled Tylenol. IBU, Oxycodone and IV Dilaudid available prn. Wean off IV narcotics as able. Abdominal binder, ice packs.   Pulm: Pt weaned off supplemental oxygen. O2 stats stable on RA. Continue IS 10x/hr.   GI: tolerating regular diet, passing flatus  : Urinary retention immediately post-op, now resolved. Pt voiding without difficulty.  FEN:  pt fully tolerating po intake.  DVT prophylaxis: SCDs. Ambulation 4x/daily encouraged. Repeat Hgb this afternoon. Hold on initiating prophylactic Lovenox until lung bx is completed.   GI prophylaxis: Daily Famotidine.   ID:  Afebrile. Monitor.  Heme: EBL: 40cc. Hgb today 8.1, stable.  Repeat this afternoon, monitor CBC daily.  Asymptomatic. VSS.      Pulmonary nodules  Bilateral pulmonary nodules noted on CT chest, largest size 10mm  Status post attempt at percutaneous biopsy without significant tissue return.  Patient would like to re-attempt lung biopsy prior to discharge. Discussed with radiology who will work on rescheduling.       T2DM  Continue blood sugar management per medicine team, appreciate assistance     Hepatitis B  Patient reports history of hepatitis B but admits to improper follow up  Liver labs wnl  Pt avoids NSAIDS    Tobacco use  Encouraged smoking cessation      Dispo: pending progress; DC once tolerating diet advancement, ambulating and pain adequately managed with oral pain medications.      Nelli Carlson PA-C  Gynecologic Oncology  [facility]  [facility]: [phone]

## 2025-07-16 NOTE — PROGRESS NOTES
[facility]    PROGRESS NOTE - Hospitalist Service    ASSESSMENT AND PLAN     Active Problems:    Vaginal bleeding    Anemia due to blood loss, acute    Monica Doshi is a 39 year old female with history of endometrial cancer, asthma, and GERD was admitted on 7/9/2025 for severe acute blood loss anemia due to worsening vaginal bleeding in the setting of progressive endometrial cancer.  Underwent hysterectomy 7/15.      Acute Blood Loss Anemia  Endometrial Cancer  CTA A/P showed fluid within the endometrial canal and proximal vaginal canal likely representing blood clots.   MRI on 7/11/2025 showed a slight interval decrease in the uterine/cervical mass with persistent bilateral parametrial involvement and suspicious pelvic sidewall lymphadenopathy.   She was transfused with 3 units PRBCs in the ED. Total transfused with 4 units of PRBC.   7/15 GYN oncology performed radical hysterectomy, bilateral salpingo-oophorectomy, bilateral sentinel lymph node for endometrial cancer   Notable operative findings include globally enlarged uterus with bilateral tumor implants in the posterior pelvic peritoneum and substantial thickening and involvement of the left parametria.  -Monitor hemoglobin- stable   -pain management   -Postoperative surgical management per GYN oncology    Pulmonary nodules  Chest CT on 7/10/2025 revealed bilateral pulmonary nodules (up to 10 mm); pulmonology recommended biopsy, but the initial IR attempt on 7/11/2025 was unsuccessful, and the patient declined repeat biopsy on 7/14/2025, deferring until after surgery.     Type 2 Diabetes  - Last A1c was 6.5% in 05/2025  - Home medications include metformin, tirzepatide  - Hold home tirzepatide (med has not been filled since 11/2024 for a 1 month supply). Resume metformin and add carb coverage   - Sliding Scale Insulin with Glucose Checks     Concern for chronic hepatitis  Hepatitis panel returned positive for hepatitis B surface antigen.   "Outpatient GI referral placed.         Mood Disorder - Continue PTA Fluoxetine  Asthma - Continue PTA Inhaler  GERD - Continue PTA Famotidine/Omeprazole     Barriers to discharge: Pain control and advancement in diet    Anticipated length of stay: 1 to 2 days    Medically Ready for Discharge: Anticipated Tomorrow    Clinically Significant Risk Factors                              # Morbid Obesity: Estimated body mass index is 43.48 kg/m  as calculated from the following:    Height as of this encounter: 1.626 m (5' 4\").    Weight as of this encounter: 114.9 kg (253 lb 4.8 oz).      # Financial/Environmental Concerns: eviction pending (pt states she is behind on rent)  # Asthma: noted on problem list            Subjective:  Patient concerned about continued pain in her abdomen.  Rates pain as 8/10 in severity currently.  States pain medications take the edge off.  No nausea.  Tolerating a diet.  Also concerned she has not had a bowel movement.  Bowel regimen ordered.    PHYSICAL EXAM  Temp:  [98  F (36.7  C)-98.8  F (37.1  C)] 98.6  F (37  C)  Pulse:  [] 86  Resp:  [16-31] 18  BP: (115-203)/(63-98) 134/69  SpO2:  [93 %-98 %] 96 %  Wt Readings from Last 1 Encounters:   07/16/25 114.9 kg (253 lb 4.8 oz)       Intake/Output Summary (Last 24 hours) at 7/16/2025 1353  Last data filed at 7/16/2025 1312  Gross per 24 hour   Intake 2439.5 ml   Output 1490 ml   Net 949.5 ml      Body mass index is 43.48 kg/m .    GENRL: Alert and answering questions appropriately. Not in acute distress. Lying in bed. Pleasant   CHEST:  Breathing easily   ABDMN: Soft.  Diffusely tender.  Bowel sounds hypoactive.    EXTRM: + trace pedal edema  NEURO: No focal neurological deficit. No involuntary movements. Normal mentation  PSYCH: Normal affect and mood.   INTGM: No skin rash    Medical Decision Making       55 MINUTES SPENT BY ME on the date of service doing chart review, history, exam, documentation & further activities per the note.  "     PERTINENT LABS/IMAGING:  Results for orders placed or performed during the hospital encounter of 07/09/25   CTA Abdomen Pelvis with Contrast    Impression    IMPRESSION:  1.  Fluid within the endometrial canal and proximal vaginal canal likely representing blood clots. No active bleeding identified.  2.  2 visualized pulmonary nodules, largest measuring 0.8 x 0.9 cm within the lingula. Recommend CT of the chest without contrast for further evaluation.   CT Chest w/o Contrast    Impression    IMPRESSION:   1.  Bilateral pulmonary nodules measuring up to 10 mm, of indeterminate etiology or significance in absence of comparison thoracic imaging. Recommend pulmonology consultation for further management and follow-up.     MR Pelvis (GYN) wo & w Contrast    Impression    IMPRESSION:  1.  Slight interval decrease in the size of the infiltrative mass involving the inferior uterus and cervix since the prior examination 05/30/2025, consistent with the patient's known endometrial adenocarcinoma and probable treatment effect. Similar   parametrial involvement on the left, which abuts the left ovary. There is also parametrial involvement on the right. Similar appearance of indeterminate pelvic sidewall lymph nodes bilaterally, suspicious for christine disease and probable IIIc staging.  2.  Trace pelvic free fluid.   US Biopsy Lung/Mediastinum    Impression    IMPRESSION:  Status post US-guided biopsy of the right chest/pleural/lung nodule.    Reference CPT Code: 81900, 21152   XR Chest Port 1 View    Impression    IMPRESSION:     No pneumothorax. No pleural effusion.    No airspace opacities or focal lung volume loss.    Unchanged heart and mediastinal interfaces.    Bone mineralization is normal.     Most Recent 3 CBC's:  Recent Labs   Lab Test 07/16/25  1248 07/16/25  0604 07/15/25  0549 07/14/25  0632 07/13/25  0743 07/12/25  0958 07/11/25  0657   WBC  --   --  10.9  --  10.8  --  13.4*   HGB 8.4* 8.1* 8.2*   < > 8.0*   < >  9.3*   MCV 76* 77* 78   < > 78   < > 77*   PLT  --   --  272  --  298  --  331    < > = values in this interval not displayed.     Most Recent 3 BMP's:  Recent Labs   Lab Test 07/16/25  1222 07/16/25  0806 07/16/25  0604 07/16/25  0303 07/15/25  0719 07/15/25  0549 07/14/25  1403 07/14/25  1118 07/10/25  0855 07/10/25  0601 07/09/25  1931 07/09/25  1528 07/09/25  1504   NA  --   --   --   --   --  138  --   --   --  136  --   --  134*   POTASSIUM  --   --  4.1  --   --  4.0  --  4.3   < > 4.3   < >  --  4.3   CHLORIDE  --   --   --   --   --  103  --   --   --  105  --   --  103   CO2  --   --   --   --   --  30*  --   --   --  25  --   --  18*   BUN  --   --   --   --   --  10.7  --   --   --  11.3  --   --  16.5   CR  --   --   --   --   --  0.63  --   --   --  0.60  --  0.8 0.80   ANIONGAP  --   --   --   --   --  5*  --   --   --  6*  --   --  13   GABRIELA  --   --   --   --   --  9.6  --   --   --  9.3  --   --  9.6   * 229*  --  345*   < > 207*   < >  --    < > 232*   < >  --  253*    < > = values in this interval not displayed.     Most Recent 2 LFT's:  Recent Labs   Lab Test 07/10/25  0601 02/22/24  1625   AST 10 18   ALT 10 27   ALKPHOS 46 62   BILITOTAL 0.7 0.4       Recent Labs   Lab Test 02/22/24  1625   CHOL 210*   HDL 41*   *   TRIG 254*     Recent Labs   Lab Test 02/22/24  1625 10/07/22  0905 04/30/21  1013   * 105* 106     Recent Labs   Lab Test 07/16/25  1222 07/16/25  0806 07/16/25  0604 07/15/25  0719 07/15/25  0549   NA  --   --   --   --  138   POTASSIUM  --   --  4.1  --  4.0   CHLORIDE  --   --   --   --  103   CO2  --   --   --   --  30*   *   < >  --    < > 207*   BUN  --   --   --   --  10.7   CR  --   --   --   --  0.63   GFRESTIMATED  --   --   --   --  >90   GABRIELA  --   --   --   --  9.6    < > = values in this interval not displayed.     Recent Labs   Lab Test 05/10/24  1158 02/22/24  1625 10/07/22  0905   A1C 6.5* 7.0* 6.4*     Recent Labs   Lab Test  "07/16/25  1248 07/16/25  0604 07/15/25  0549   HGB 8.4* 8.1* 8.2*     No results for input(s): \"TROPONINI\" in the last 05090 hours.  No results for input(s): \"BNP\", \"NTBNPI\", \"NTBNP\" in the last 27411 hours.  Recent Labs   Lab Test 05/10/24  1158   TSH 1.62     Recent Labs   Lab Test 07/09/25  1505   INR 1.04       Sarai Carrasco DO  Hospitalist Service  [facility]      "

## 2025-07-16 NOTE — PROGRESS NOTES
"CLINICAL NUTRITION SERVICES - ASSESSMENT NOTE    RECOMMENDATIONS FOR MDs/PROVIDERS TO ORDER:  None    Registered Dietitian Interventions:  Medical food supplement therapy - Glucerna daily    Future/Additional Recommendations:  Monitor po intake, ONS needs     REASON FOR ASSESSMENT  LOS    INFORMATION OBTAINED  Assessed patient over the phone (RD working remotely).     NUTRITION HISTORY  Per chart review, pt's wt is up 6# since January. She is frequently NPO this admission, but is eating well when she has a diet.    7/15: Robotic assisted modified radical hysterectomy    CURRENT NUTRITION ORDERS  Diet: Moderate Consistent Carbohydrate  Snacks/Supplements: None      CURRENT INTAKE/TOLERANCE  Pt is ordering adequate meals at regular mealtimes, and consuming 100% of them per flow sheets and [facility] data. Pt is ordering 2 meals/day on average due to frequent NPO for procedures. Pt NPO for all of 7/15 for surgery.    Pt ordered 3 meals on  and consumed 100% of them, providing 1860 kcal and 94 g PRO. This meets 100% of pt's kcal and protein estimated needs.    LABS  Nutrition-relevant labs: B-345 (H) past 24 hours    MEDICATIONS  Nutrition-relevant medications: Pepcid, Novolog, Metformin, protonix, Miralax, senna, Vitamin C, Vitamin D3    ANTHROPOMETRICS  Height: 162.6 cm (5' 4\")  Admission Weight: 113.4 kg (250 lb) (25 1532)   Most Recent Weight: 114.9 kg (253 lb 4.8 oz) (25 0620)  IBW: 54.7 kg  BMI: Body mass index is 43.48 kg/m .   Weight History:   25 : 114.9 kg (253 lb 4.8 oz)   25 : 112 kg (247 lb ) - Care Everywhere  24 : 118.8 kg (262 lb)     Dosing Weight: 69 kg, based on adjusted wt    ASSESSED NUTRITION NEEDS  Estimated Energy Needs: 7541-4060 kcals/day (20 - 25 kcals/kg)  Justification: Obese  Estimated Protein Needs:  grams protein/day (1.2 - 1.5 grams of pro/kg)  Justification: post-op, cancer  Estimated Fluid Needs: 5831-0539 mL/day (1 " mL/kcal)  Justification: Maintenance    SYSTEM AND PHYSICAL FINDINGS    GI symptoms: LBM on 7/16, abdominal discomfort  Skin/wounds: Reviewed    MALNUTRITION  % Intake: Decreased intake does not meet criteria  % Weight Loss: None noted  Subcutaneous Fat Loss: Unable to assess  Muscle Loss: Unable to assess  Fluid Accumulation/Edema: None noted  Malnutrition Diagnosis: Patient does not meet two of the established criteria necessary for diagnosing malnutrition  Malnutrition Present on Admission: No    NUTRITION DIAGNOSIS  Increased nutrient needs (protein) related to cancer dx as evidenced by 1.2-1.5 g/kg protein per day    INTERVENTIONS  Medical food supplement therapy - Glucerna daily    GOALS  Total avg nutritional intake to meet a minimum of 20 kcal/kg and 1.2 g PRO/kg daily (per dosing wt 69 kg).     MONITORING/EVALUATION  Progress toward goals will be monitored and evaluated per policy.

## 2025-07-16 NOTE — OR NURSING
Called anesthesiologist regarding blood sugar of 238 and no new order given. The floor will just recheck it later and  follow sliding scale

## 2025-07-16 NOTE — PLAN OF CARE
Problem: Pain Acute  Goal: Optimal Pain Control and Function  Outcome: Progressing  Intervention: Prevent or Manage Pain  Recent Flowsheet Documentation  Taken 7/16/2025 0425 by Casper Doshi RN  Medication Review/Management: medications reviewed     Problem: Surgery Nonspecified  Goal: Effective Bowel Elimination  Outcome: Progressing  Goal: Anesthesia/Sedation Recovery  Intervention: Optimize Anesthesia Recovery  Recent Flowsheet Documentation  Taken 7/16/2025 0425 by Casper Doshi, IVAN  Safety Promotion/Fall Prevention:   clutter free environment maintained   increased rounding and observation   room organization consistent   safety round/check completed   increase visualization of patient  Goal: Effective Oxygenation and Ventilation  Intervention: Optimize Oxygenation and Ventilation  Recent Flowsheet Documentation  Taken 7/16/2025 0425 by Casper Doshi, RN  Activity Management: activity adjusted per tolerance  Head of Bed (HOB) Positioning: HOB at 20-30 degrees     Goal Outcome Evaluation: Cares from 300-700: Pt slept between cares. Rate pain 8/10. Pain was manage with IV dilaudid x2 and brought pain down to 5/10. A&Ox4. VSS. Afebrile. On 2L O2 NC and capno. Mag, Phos, and K protocol. AM check. Passing gas. Independent in room. Can make needs be known.

## 2025-07-16 NOTE — PROGRESS NOTES
"SPIRITUAL HEALTH SERVICES NOTE  [facility]; P2    Reason for Visit: Follow-up    SPIRITUAL ASSESSMENT  Feeling good that she had the surgery  Encouraged by the support she has received from others    Patient/Family Understanding of Illness and Goals of Care - Follow-up visit with Monica. She was in good spirits and welcomed a visit. She said that she had pain about every two hours overnight, but today it has been more like every three hours. She says that she has already walked once and plans to a few more times today. She is glad that she made the decision to have surgery, saying \"The incisions hurt, but that pain from my uterus was like 50 times worse than this. And I'm not bleeding anymore! So I'm feeling really good about it.\" She says that Dr. Isbell's team continues to check on her, which she is grateful for. She also notes that she has had some help addressing her nutrition today, which makes her feel better about managing her diabetes going forward.     Distress and Loss - Monica is missing her parents at this time, as she is feeling vulnerable and could use their comfort and support.     Strengths, Coping, and Resources - Monica shares that one of her friends' mothers has informally adopted Monica and that she had a sweet conversation with her last night, which made Monica feel very loved and cared for.     Meaning, Beliefs, and Spirituality - Monica feels that God is providing the people that she needs in her life right now and that is comforting and encouraging to her. Prayer shared.     Plan of Care: Will follow-up with Monica again tomorrow.     Mary Ellen Briones M.Div.    Office: [phone] (for non-urgent requests)  Please Vocera or page through [facility] for time-sensitive requests    "

## 2025-07-16 NOTE — PLAN OF CARE
"Goal Outcome Evaluation:       Off unit for most of shift for surgery.  Returned around 20:15. Initially unable to void and symptomatic. Bladder scanned for 487 mL. Straight cathed for 500 mL. Pt reported immediate relief. Reported that she was able to void on her own about an hour later.   - Pain managed initially with scheduled acetaminophen. Around 23:30, pt crying in pain. Pt laying on side, clinging to side rail, hyperventilating, tachycardic, and crying.  Gave 0.5 mg IV hydromorphone while at the same time, getting her to take deep breaths. Applied warm pack to lower abdomen/pubic area. She reported pain \"50/10\". Within 10 minutes, pain down to 8/10 and breathing & HR returned to normal.  Encouraged oxycodone at 00:42.   - Lap sites c/d/I x5. Abdominal binder in place.   - 2L oxygen for comfort.     Mona Burgess RN-BC    Problem: Pain Acute  Goal: Optimal Pain Control and Function  Outcome: Progressing     Problem: Comorbidity Management  Goal: Maintenance of Behavioral Health Symptom Control  Outcome: Progressing  Goal: Blood Glucose Levels Within Targeted Range  Outcome: Progressing                        "

## 2025-07-17 ENCOUNTER — APPOINTMENT (OUTPATIENT)
Dept: PHYSICAL THERAPY | Facility: HOSPITAL | Age: 40
DRG: 740 | End: 2025-07-17
Attending: INTERNAL MEDICINE
Payer: COMMERCIAL

## 2025-07-17 VITALS
DIASTOLIC BLOOD PRESSURE: 95 MMHG | TEMPERATURE: 98 F | BODY MASS INDEX: 43.55 KG/M2 | HEART RATE: 110 BPM | HEIGHT: 64 IN | WEIGHT: 255.1 LBS | OXYGEN SATURATION: 99 % | SYSTOLIC BLOOD PRESSURE: 171 MMHG | RESPIRATION RATE: 20 BRPM

## 2025-07-17 LAB
GLUCOSE BLDC GLUCOMTR-MCNC: 181 MG/DL (ref 70–99)
GLUCOSE BLDC GLUCOMTR-MCNC: 198 MG/DL (ref 70–99)
GLUCOSE BLDC GLUCOMTR-MCNC: 243 MG/DL (ref 70–99)
GLUCOSE BLDC GLUCOMTR-MCNC: 283 MG/DL (ref 70–99)
HGB BLD-MCNC: 7.5 G/DL (ref 11.7–15.7)
HGB BLD-MCNC: 8.2 G/DL (ref 11.7–15.7)
MAGNESIUM SERPL-MCNC: 2.1 MG/DL (ref 1.7–2.3)
MCV RBC AUTO: 78 FL (ref 78–100)
MCV RBC AUTO: 79 FL (ref 78–100)
PATH REPORT.COMMENTS IMP SPEC: ABNORMAL
PATH REPORT.COMMENTS IMP SPEC: YES
PATH REPORT.FINAL DX SPEC: ABNORMAL
PATH REPORT.GROSS SPEC: ABNORMAL
PATH REPORT.MICROSCOPIC SPEC OTHER STN: ABNORMAL
PATH REPORT.RELEVANT HX SPEC: ABNORMAL
PHOSPHATE SERPL-MCNC: 3.1 MG/DL (ref 2.5–4.5)
POTASSIUM SERPL-SCNC: 4.1 MMOL/L (ref 3.4–5.3)

## 2025-07-17 PROCEDURE — 97530 THERAPEUTIC ACTIVITIES: CPT | Mod: GP

## 2025-07-17 PROCEDURE — 250N000013 HC RX MED GY IP 250 OP 250 PS 637: Performed by: PHYSICIAN ASSISTANT

## 2025-07-17 PROCEDURE — 84132 ASSAY OF SERUM POTASSIUM: CPT | Performed by: INTERNAL MEDICINE

## 2025-07-17 PROCEDURE — 99233 SBSQ HOSP IP/OBS HIGH 50: CPT | Performed by: INTERNAL MEDICINE

## 2025-07-17 PROCEDURE — 250N000013 HC RX MED GY IP 250 OP 250 PS 637: Performed by: INTERNAL MEDICINE

## 2025-07-17 PROCEDURE — 83735 ASSAY OF MAGNESIUM: CPT | Performed by: INTERNAL MEDICINE

## 2025-07-17 PROCEDURE — 85018 HEMOGLOBIN: CPT | Performed by: INTERNAL MEDICINE

## 2025-07-17 PROCEDURE — 84100 ASSAY OF PHOSPHORUS: CPT | Performed by: INTERNAL MEDICINE

## 2025-07-17 PROCEDURE — 250N000013 HC RX MED GY IP 250 OP 250 PS 637: Performed by: STUDENT IN AN ORGANIZED HEALTH CARE EDUCATION/TRAINING PROGRAM

## 2025-07-17 PROCEDURE — 36415 COLL VENOUS BLD VENIPUNCTURE: CPT | Performed by: INTERNAL MEDICINE

## 2025-07-17 PROCEDURE — 97161 PT EVAL LOW COMPLEX 20 MIN: CPT | Mod: GP

## 2025-07-17 PROCEDURE — 250N000011 HC RX IP 250 OP 636: Performed by: PHYSICIAN ASSISTANT

## 2025-07-17 PROCEDURE — 250N000012 HC RX MED GY IP 250 OP 636 PS 637: Performed by: INTERNAL MEDICINE

## 2025-07-17 RX ORDER — IBUPROFEN 200 MG
600 TABLET ORAL EVERY 6 HOURS PRN
Status: SHIPPED
Start: 2025-07-17

## 2025-07-17 RX ORDER — OXYCODONE HYDROCHLORIDE 5 MG/1
5 TABLET ORAL EVERY 4 HOURS PRN
Qty: 15 TABLET | Refills: 0 | Status: SHIPPED | OUTPATIENT
Start: 2025-07-17

## 2025-07-17 RX ORDER — AMOXICILLIN 250 MG
1 CAPSULE ORAL 2 TIMES DAILY PRN
Qty: 30 TABLET | Refills: 0 | Status: SHIPPED | OUTPATIENT
Start: 2025-07-17

## 2025-07-17 RX ORDER — HYDROMORPHONE HYDROCHLORIDE 1 MG/ML
0.5 INJECTION, SOLUTION INTRAMUSCULAR; INTRAVENOUS; SUBCUTANEOUS
Status: DISCONTINUED | OUTPATIENT
Start: 2025-07-17 | End: 2025-07-17 | Stop reason: HOSPADM

## 2025-07-17 RX ORDER — HYDROMORPHONE HCL IN WATER/PF 6 MG/30 ML
.2-.5 PATIENT CONTROLLED ANALGESIA SYRINGE INTRAVENOUS
Status: DISCONTINUED | OUTPATIENT
Start: 2025-07-17 | End: 2025-07-17

## 2025-07-17 RX ADMIN — FAMOTIDINE 20 MG: 20 TABLET ORAL at 09:46

## 2025-07-17 RX ADMIN — METFORMIN ER 500 MG 500 MG: 500 TABLET ORAL at 16:54

## 2025-07-17 RX ADMIN — INSULIN ASPART 2 UNITS: 100 INJECTION, SOLUTION INTRAVENOUS; SUBCUTANEOUS at 16:55

## 2025-07-17 RX ADMIN — ACETAMINOPHEN 1000 MG: 500 TABLET ORAL at 08:25

## 2025-07-17 RX ADMIN — Medication 1000 MG: at 09:46

## 2025-07-17 RX ADMIN — POLYETHYLENE GLYCOL 3350 17 G: 17 POWDER, FOR SOLUTION ORAL at 08:27

## 2025-07-17 RX ADMIN — HYDROMORPHONE HYDROCHLORIDE 0.4 MG: 0.2 INJECTION, SOLUTION INTRAMUSCULAR; INTRAVENOUS; SUBCUTANEOUS at 03:04

## 2025-07-17 RX ADMIN — INSULIN ASPART 3 UNITS: 100 INJECTION, SOLUTION INTRAVENOUS; SUBCUTANEOUS at 08:32

## 2025-07-17 RX ADMIN — POLYETHYLENE GLYCOL 3350 17 G: 17 POWDER, FOR SOLUTION ORAL at 13:31

## 2025-07-17 RX ADMIN — INSULIN ASPART 1 UNITS: 100 INJECTION, SOLUTION INTRAVENOUS; SUBCUTANEOUS at 11:56

## 2025-07-17 RX ADMIN — OXYCODONE HYDROCHLORIDE 10 MG: 5 TABLET ORAL at 17:03

## 2025-07-17 RX ADMIN — DOCUSATE SODIUM 50 MG AND SENNOSIDES 8.6 MG 1 TABLET: 8.6; 5 TABLET, FILM COATED ORAL at 08:27

## 2025-07-17 RX ADMIN — PANTOPRAZOLE SODIUM 40 MG: 40 TABLET, DELAYED RELEASE ORAL at 08:26

## 2025-07-17 RX ADMIN — ACETAMINOPHEN 1000 MG: 500 TABLET ORAL at 14:35

## 2025-07-17 RX ADMIN — HYDROMORPHONE HYDROCHLORIDE 0.4 MG: 0.2 INJECTION, SOLUTION INTRAMUSCULAR; INTRAVENOUS; SUBCUTANEOUS at 08:21

## 2025-07-17 RX ADMIN — FLUOXETINE HYDROCHLORIDE 40 MG: 20 CAPSULE ORAL at 09:46

## 2025-07-17 RX ADMIN — CETIRIZINE HYDROCHLORIDE 10 MG: 10 TABLET, FILM COATED ORAL at 09:46

## 2025-07-17 RX ADMIN — OXYCODONE HYDROCHLORIDE 10 MG: 5 TABLET ORAL at 13:12

## 2025-07-17 RX ADMIN — IBUPROFEN 600 MG: 600 TABLET ORAL at 02:31

## 2025-07-17 RX ADMIN — Medication 100 MCG: at 09:46

## 2025-07-17 RX ADMIN — INSULIN GLARGINE 10 UNITS: 100 INJECTION, SOLUTION SUBCUTANEOUS at 09:58

## 2025-07-17 RX ADMIN — ACETAMINOPHEN 1000 MG: 500 TABLET ORAL at 02:31

## 2025-07-17 ASSESSMENT — ACTIVITIES OF DAILY LIVING (ADL)
ADLS_ACUITY_SCORE: 43
ADLS_ACUITY_SCORE: 44
ADLS_ACUITY_SCORE: 43
ADLS_ACUITY_SCORE: 44
ADLS_ACUITY_SCORE: 43
ADLS_ACUITY_SCORE: 43
ADLS_ACUITY_SCORE: 44
ADLS_ACUITY_SCORE: 44

## 2025-07-17 NOTE — PLAN OF CARE
Problem: Adult Inpatient Plan of Care  Goal: Plan of Care Review  Outcome: Progressing  Flowsheets (Taken 7/16/2025 2204)  Plan of Care Reviewed With: patient  Overall Patient Progress: no change     Problem: Adult Inpatient Plan of Care  Goal: Optimal Comfort and Wellbeing  Outcome: Progressing     Problem: Pain Acute  Goal: Optimal Pain Control and Function  Outcome: Progressing       Problem: Surgery Nonspecified  Goal: Effective Bowel Elimination  Intervention: Enhance Bowel Motility and Elimination  Recent Flowsheet Documentation  Taken 7/16/2025 2157 by Jessee Lindsey RN  Bowel Motility Enhancement:   ambulation promoted   fluid intake encouraged  Bowel Elimination Management: relaxation techniques promoted     Problem: Surgery Nonspecified  Goal: Fluid and Electrolyte Balance  Intervention: Monitor and Manage Fluid and Electrolyte Balance  Recent Flowsheet Documentation  Taken 7/16/2025 2157 by Jessee Lindsey RN  Fluid/Electrolyte Management: oral rehydration therapy initiated     Problem: Surgery Nonspecified  Goal: Blood Glucose Level Within Targeted Range  Intervention: Optimize Glycemic Control  Recent Flowsheet Documentation  Taken 7/16/2025 2157 by Jessee Lindsey RN  Hyperglycemia Management: blood glucose monitored  Hypoglycemia Management: blood glucose monitored     Goal Outcome Evaluation:      Plan of Care Reviewed With: patient    Overall Patient Progress: no changeOverall Patient Progress: no change    A/Ox3. VSS. Afebrile. Communicates needs. Friend at bedside. BG monitoring, 233, 324, coverage given. Pain 7-8/10, Prn dilaudid and oxycodone and scheduled pain meds given, somewhat effective, 6/10. HS trazadone given. Encouraged to ambulate throughout the day, she went for a walk with her friend after dinner. At HS c/o feeling burning and some wetness to mid lower abdominal incision. Writer assessed and noted left half of incision had clear weeping noted, cleansed with soap and water, pat dried and  4x4 dry gauze placed at area to prevent further irritation. Ice pack to area for comfort and relief. [facility] notified. Will continue to monitor.    K Mg, Phos protocol, recheck in AM.    Jessee Lindsey RN

## 2025-07-17 NOTE — DISCHARGE SUMMARY
Pt discharged home. Ambulated with staff member and cart of belonging to main entrance.  A friend was with pt.      Stephanie Gonzalez RN

## 2025-07-17 NOTE — PLAN OF CARE
Physical Therapy Discharge Summary    Reason for therapy discharge:    All goals and outcomes met, no further needs identified.    Progress towards therapy goal(s). See goals on Care Plan in [facility] electronic health record for goal details.  Goals met    Therapy recommendation(s):    No further therapy is recommended.

## 2025-07-17 NOTE — PROGRESS NOTES
07/17/25 5100   Appointment Info   Signing Clinician's Name / Credentials (PT) Therese Madison PT   Rehab Comments (PT) Patient ambulating out on hallway -going down to smoke.Anxious to go home today.Wants to do stairs and get sugestions for getting in /out of bed.   Living Environment   People in Home alone   Current Living Arrangements apartment   Home Accessibility stairs to enter home   Number of Stairs, Main Entrance 10   Stair Railings, Main Entrance railings safe and in good condition   Transportation Anticipated family or friend will provide   Self-Care   Usual Activity Tolerance good   Current Activity Tolerance good   Equipment Currently Used at Home none   Fall history within last six months no   General Information   Onset of Illness/Injury or Date of Surgery 07/09/25   Referring Physician Sarai Carrasco   Patient/Family Therapy Goals Statement (PT) return home   Pertinent History of Current Problem (include personal factors and/or comorbidities that impact the POC) Admitted for radical hysterectomy due to vaginal bleeding and endometrial Ca.   Existing Precautions/Restrictions abdominal   Weight-Bearing Status - LLE full weight-bearing   Weight-Bearing Status - RLE full weight-bearing   Cognition   Affect/Mental Status (Cognition) WNL   Orientation Status (Cognition) oriented x 4   Follows Commands (Cognition) WNL   Pain Assessment   Patient Currently in Pain Yes, see Vital Sign flowsheet   Range of Motion (ROM)   Range of Motion ROM is WFL   Strength (Manual Muscle Testing)   Strength (Manual Muscle Testing) strength is WFL   Gait/Stairs (Locomotion)   [place] Level (Gait) independent   Assistive Device (Gait) other (see comments)  (none)   Distance in Feet (Gait) 200   Pattern (Gait) step-through   Maintains Weight-bearing Status (Gait) able to maintain   Negotiation (Stairs) stairs independence;handrail location;number of steps   [place] Level (Stairs) supervision   Handrail Location  (Stairs) right side (ascending)   Number of Steps (Stairs) 11x 2- 22 total down   Comment, (Gait/Stairs) steady   Clinical Impression   Criteria for Skilled Therapeutic Intervention Evaluation only   PT Diagnosis (PT) impaired functional mobility   Influenced by the following impairments surgery,pain   Functional limitations due to impairments bed mobility   Clinical Presentation (PT Evaluation Complexity) stable   Clinical Presentation Rationale patient preesnts as med diagnosed   Clinical Decision Making (Complexity) low complexity   Planned Therapy Interventions (PT) bed mobility training;stair training   Risk & Benefits of therapy have been explained evaluation/treatment results reviewed;patient;participants included   PT Total Evaluation Time   PT Eval, Low Complexity Minutes (13971) 15   Physical Therapy Goals   PT Frequency One time eval and treatment only   PT Predicted Duration/Target Date for Goal Attainment 07/17/25   PT Goals Bed Mobility;Transfers;Gait;Stairs   PT: Bed Mobility Independent;Supine to/from sit;Within precautions;Goal Met   PT: Transfers Independent;Sit to/from stand;Goal Met   PT: Gait Independent;Greater than 200 feet;Goal Met   PT: Stairs Modified independent;Greater than 10 stairs;Rail on right;Goal Met   Interventions   Interventions Quick Adds Therapeutic Activity   Therapeutic Activity   Therapeutic Activities: dynamic activities to improve functional performance Minutes (27786) 8   Symptoms Noted During/After Treatment None   Treatment Detail/Skilled Intervention patient educated and practiced log roll to get out /into bed with no bed rail .Negotiated 11 steps up and down with rail on R with cues to do one step at the time.   PT Discharge Planning   PT Plan d/c PT -pt has no needs for acute ,skilled PT   PT Discharge Recommendation (DC Rec) home with assist   PT Rationale for DC Rec Doing well ,will have friend to stay with pt and assist as needed   PT Brief overview of current  status i/SBA mobility and amb .Able to negotiate 11 steps with one rail ,mod I   PT Total Distance Amb During Session (feet) 200   PT Equipment Needed at Discharge   (none needed)   Physical Therapy Time and Intention   Timed Code Treatment Minutes 8   Total Session Time (sum of timed and untimed services) 23

## 2025-07-17 NOTE — PROGRESS NOTES
[facility]    PROGRESS NOTE - Hospitalist Service    ASSESSMENT AND PLAN     Active Problems:    Vaginal bleeding    Anemia due to blood loss, acute    Monica Doshi is a 39 year old female with history of endometrial cancer, asthma, and GERD was admitted on 7/9/2025 for severe acute blood loss anemia due to worsening vaginal bleeding in the setting of progressive endometrial cancer.  Underwent hysterectomy 7/15.      Acute Blood Loss Anemia  Endometrial Cancer  CTA A/P showed fluid within the endometrial canal and proximal vaginal canal likely representing blood clots.   MRI on 7/11/2025 showed a slight interval decrease in the uterine/cervical mass with persistent bilateral parametrial involvement and suspicious pelvic sidewall lymphadenopathy.   She was transfused with 3 units PRBCs in the ED. Total transfused with 4 units of PRBC.   7/15 GYN oncology performed radical hysterectomy, bilateral salpingo-oophorectomy, bilateral sentinel lymph node for endometrial cancer   Notable operative findings include globally enlarged uterus with bilateral tumor implants in the posterior pelvic peritoneum and substantial thickening and involvement of the left parametria.  -Monitor hemoglobin- stable   -Postoperative surgical management per GYN oncology-stable per gynecology for discharge.    Intractable pain related to above  Counseled patient on attempting to use oral pain medications today and IV pain medication for breakthrough pain.  She is agreeable  Hopefully discharge tomorrow on oral oxycodone     Pulmonary nodules  Chest CT on 7/10/2025 revealed bilateral pulmonary nodules (up to 10 mm); pulmonology recommended biopsy, but the initial IR attempt on 7/11/2025 was unsuccessful, and the patient declined repeat biopsy on 7/14/2025, deferring until after surgery.      Type 2 Diabetes  - Last A1c was 6.5% in 05/2025  - Home medications include metformin, tirzepatide  - Hold home tirzepatide (med has not  "been filled since 11/2024 for a 1 month supply). Resume metformin and add carb coverage   - Sliding Scale Insulin with Glucose Checks     Concern for chronic hepatitis  Hepatitis panel returned positive for hepatitis B surface antigen.  Outpatient GI referral placed.         Mood Disorder - Continue PTA Fluoxetine  Asthma - Continue PTA Inhaler  GERD - Continue PTA Famotidine/Omeprazole     Barriers to discharge: Pain control, PT evaluation      Anticipated length of stay: 1 more day    Medically Ready for Discharge: Anticipated Tomorrow    Clinically Significant Risk Factors                              # Morbid Obesity: Estimated body mass index is 43.79 kg/m  as calculated from the following:    Height as of this encounter: 1.626 m (5' 4\").    Weight as of this encounter: 115.7 kg (255 lb 1.6 oz).      # Financial/Environmental Concerns: eviction pending (pt states she is behind on rent)  # Asthma: noted on problem list            Subjective:  Patient resting in bed.  Notes she is concerned about going home as she frequently has been having significant pain.  Will trial oral oxycodone today for pain.  Also is concerned about walking and doing the stairs at home.  Physical therapy ordered.  No other complaints.    PHYSICAL EXAM  Temp:  [97.7  F (36.5  C)-98  F (36.7  C)] 97.7  F (36.5  C)  Pulse:  [75-94] 94  Resp:  [18-20] 20  BP: (123-134)/(71-83) 134/83  SpO2:  [95 %-97 %] 96 %  Wt Readings from Last 1 Encounters:   07/17/25 115.7 kg (255 lb 1.6 oz)       Intake/Output Summary (Last 24 hours) at 7/17/2025 1500  Last data filed at 7/17/2025 0930  Gross per 24 hour   Intake 1149 ml   Output --   Net 1149 ml      Body mass index is 43.79 kg/m .    GENRL: Alert and answering questions appropriately. Not in acute distress. Sitting in bed. Pleasant   CHEST:  Breathing easily   EXTRM: + trace pedal edema  NEURO: No focal neurological deficit. No involuntary movements. Normal mentation  PSYCH: Normal affect and mood. "   INTGM: No skin rash    Medical Decision Making       55 MINUTES SPENT BY ME on the date of service doing chart review, history, exam, documentation & further activities per the note.      PERTINENT LABS/IMAGING:  Results for orders placed or performed during the hospital encounter of 07/09/25   CTA Abdomen Pelvis with Contrast    Impression    IMPRESSION:  1.  Fluid within the endometrial canal and proximal vaginal canal likely representing blood clots. No active bleeding identified.  2.  2 visualized pulmonary nodules, largest measuring 0.8 x 0.9 cm within the lingula. Recommend CT of the chest without contrast for further evaluation.   CT Chest w/o Contrast    Impression    IMPRESSION:   1.  Bilateral pulmonary nodules measuring up to 10 mm, of indeterminate etiology or significance in absence of comparison thoracic imaging. Recommend pulmonology consultation for further management and follow-up.     MR Pelvis (GYN) wo & w Contrast    Impression    IMPRESSION:  1.  Slight interval decrease in the size of the infiltrative mass involving the inferior uterus and cervix since the prior examination 05/30/2025, consistent with the patient's known endometrial adenocarcinoma and probable treatment effect. Similar   parametrial involvement on the left, which abuts the left ovary. There is also parametrial involvement on the right. Similar appearance of indeterminate pelvic sidewall lymph nodes bilaterally, suspicious for christine disease and probable IIIc staging.  2.  Trace pelvic free fluid.   US Biopsy Lung/Mediastinum    Impression    IMPRESSION:  Status post US-guided biopsy of the right chest/pleural/lung nodule.    Reference CPT Code: 90817, 54151   XR Chest Port 1 View    Impression    IMPRESSION:     No pneumothorax. No pleural effusion.    No airspace opacities or focal lung volume loss.    Unchanged heart and mediastinal interfaces.    Bone mineralization is normal.     Most Recent 3 CBC's:  Recent Labs   Lab  Test 07/17/25  1405 07/17/25  0608 07/16/25  1248 07/16/25  0604 07/15/25  0549 07/14/25  0632 07/13/25  0743 07/12/25  0958 07/11/25  0657   WBC  --   --   --   --  10.9  --  10.8  --  13.4*   HGB 8.2* 7.5* 8.4*   < > 8.2*   < > 8.0*   < > 9.3*   MCV 79 78 76*   < > 78   < > 78   < > 77*   PLT  --   --   --   --  272  --  298  --  331    < > = values in this interval not displayed.     Most Recent 3 BMP's:  Recent Labs   Lab Test 07/17/25  1155 07/17/25  0828 07/17/25  0608 07/17/25  0159 07/16/25  0806 07/16/25  0604 07/15/25  0719 07/15/25  0549 07/10/25  0855 07/10/25  0601 07/09/25  1931 07/09/25  1528 07/09/25  1504   NA  --   --   --   --   --   --   --  138  --  136  --   --  134*   POTASSIUM  --   --  4.1  --   --  4.1  --  4.0   < > 4.3   < >  --  4.3   CHLORIDE  --   --   --   --   --   --   --  103  --  105  --   --  103   CO2  --   --   --   --   --   --   --  30*  --  25  --   --  18*   BUN  --   --   --   --   --   --   --  10.7  --  11.3  --   --  16.5   CR  --   --   --   --   --   --   --  0.63  --  0.60  --  0.8 0.80   ANIONGAP  --   --   --   --   --   --   --  5*  --  6*  --   --  13   GABRIELA  --   --   --   --   --   --   --  9.6  --  9.3  --   --  9.6   * 243*  --  283*   < >  --    < > 207*   < > 232*   < >  --  253*    < > = values in this interval not displayed.     Most Recent 2 LFT's:  Recent Labs   Lab Test 07/10/25  0601 02/22/24  1625   AST 10 18   ALT 10 27   ALKPHOS 46 62   BILITOTAL 0.7 0.4       Recent Labs   Lab Test 02/22/24  1625   CHOL 210*   HDL 41*   *   TRIG 254*     Recent Labs   Lab Test 02/22/24  1625 10/07/22  0905 04/30/21  1013   * 105* 106     Recent Labs   Lab Test 07/17/25  1155 07/17/25  0828 07/17/25  0608 07/15/25  0719 07/15/25  0549   NA  --   --   --   --  138   POTASSIUM  --   --  4.1   < > 4.0   CHLORIDE  --   --   --   --  103   CO2  --   --   --   --  30*   *   < >  --    < > 207*   BUN  --   --   --   --  10.7   CR  --   --   --    "--  0.63   GFRESTIMATED  --   --   --   --  >90   GABRIELA  --   --   --   --  9.6    < > = values in this interval not displayed.     Recent Labs   Lab Test 05/10/24  1158 02/22/24  1625 10/07/22  0905   A1C 6.5* 7.0* 6.4*     Recent Labs   Lab Test 07/17/25  1405 07/17/25  0608 07/16/25  1248   HGB 8.2* 7.5* 8.4*     No results for input(s): \"TROPONINI\" in the last 77105 hours.  No results for input(s): \"BNP\", \"NTBNPI\", \"NTBNP\" in the last 82562 hours.  Recent Labs   Lab Test 05/10/24  1158   TSH 1.62     Recent Labs   Lab Test 07/09/25  1505   INR 1.04       Sarai Carrasco,   Hospitalist Service  [facility]      "

## 2025-07-17 NOTE — PLAN OF CARE
Problem: Comorbidity Management  Goal: Blood Glucose Levels Within Targeted Range  Outcome: Not Progressing  Intervention: Monitor and Manage Glycemia  Recent Flowsheet Documentation  Taken 7/17/2025 0821 by Marco A Quinn RN  Medication Review/Management: medications reviewed     Problem: Anemia  Goal: Anemia Symptom Improvement  Outcome: Progressing  Intervention: Monitor and Manage Anemia  Recent Flowsheet Documentation  Taken 7/17/2025 0821 by Marco A Quinn RN  Safety Promotion/Fall Prevention:   lighting adjusted   patient and family education   nonskid shoes/slippers when out of bed     Problem: Pain Acute  Goal: Optimal Pain Control and Function  Outcome: Progressing  Intervention: Develop Pain Management Plan  Recent Flowsheet Documentation  Taken 7/17/2025 0821 by Marco A Quinn RN  Pain Management Interventions: medication (see MAR)  Intervention: Prevent or Manage Pain  Recent Flowsheet Documentation  Taken 7/17/2025 0821 by Marco A Quinn RN  Medication Review/Management: medications reviewed     Goal Outcome Evaluation:  VSS on RA. Pt was very uncomfortable and restless at start of shift, PRN dilaudid effective. Gave oxy later in shift as well. Was supposed to have lung biopsy today but pt refused and it will now be done outpatient. Lap sites CDI with gauze on lower incision. She states her thighs have felt numb since surgery, notified gyn/onc. BG ACHS with carb counts. Pt had a BM today but felt like she needed to go again, and was given PRN miralax per her request. Pt wanted a PT consult to learn more exercises to help with pain; provider placed consult but they have yet to see pt. Hgb dropped to 7.5 this morning so there was a recheck this afternoon, now at 8.2. Mg, K, and P recheck tomorrow. Pt is eager to discharge.    Marco A Quinn RN  [phone]

## 2025-07-17 NOTE — PROGRESS NOTES
"SPIRITUAL HEALTH SERVICES NOTE  [facility]; P2    Reason for Visit: Follow-up    SPIRITUAL ASSESSMENT  Feels ready and confident about going home    Patient/Family Understanding of Illness and Goals of Care - Monica had just finished a walk on the unit. She was tired, but says \"My apartment is small and the walk to the door of the building is not as far as this unit, so I should be fine. She is eager to discharge today, stating that she feels ready and confident. Her main concern at this time is working with PT and getting her prescriptions filled at discharge.     Distress and Loss - Not discussed during this visit    Strengths, Coping, and Resources - Not discussed during this visit    Meaning, Beliefs, and Spirituality - Monica welcomed a prayer before she discharges.     Plan of Care: No plans for follow-up at this time due to patient's anticipated discharge today/tomorrow.  available by patient or staff request.     Mary Ellen Briones M.Div.    Office: [phone] (for non-urgent requests)  Please Vocera or page through [facility] for time-sensitive requests    "

## 2025-07-17 NOTE — PROGRESS NOTES
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 She is s/p Mirena IUD insertion 8/20/24 and thereafter failed to follow up. She has now been seen in an urgent, acute fashion with heavy vaginal bleeding and severe anemia on multiple occasions with evidence for progressive disease. She was recommended surgical management in May 2025 as an outpatient and failed to follow through with surgical planning.   She underwent Robotic assisted modified radical hysterectomy, Bilateral salpingo-oophorectomy, [place] Lymph Node ICG Injection and Biopsy, Mini laparotomy, Bilateral pelvic lymph node dissections on 7/15/25.    Subjective: patient out of the room this am ambulating outside with family. Reports smoke break. Could not find patient. Per RN patient declined to do IR biopsy and was not made NPO. IR plans to schedule as an outpatient.       EXAM:  Vitals: /83 (BP Location: Right arm)   Pulse 94   Temp 97.7  F (36.5  C) (Oral)   Resp 20   Ht 1.626 m (5' 4\")   Wt 115.7 kg (255 lb 1.6 oz)   SpO2 96%   BMI 43.79 kg/m    BMI= Body mass index is 43.79 kg/m .     GENERAL: alert, NAD; morbidly obese      LABS  Recent Labs   Lab Test 07/17/25  0608 07/16/25  1248 07/16/25  0604 07/15/25  0549 07/14/25  0632 07/13/25  0743   WBC  --   --   --  10.9  --  10.8   RBC  --   --   --  3.38*  --  3.37*   HGB 7.5* 8.4*   < > 8.2*   < > 8.0*   HCT  --   --   --  26.3*  --  26.4*   MCV 78 76*   < > 78   < > 78   MCH  --   --   --  24.3*  --  23.7*   MCHC  --   --   --  31.2*  --  30.3*   PLT  --   --   --  272  --  298    < > = values in this interval not displayed.     Recent Labs   Lab " Test 07/17/25  0608 07/16/25  0604 07/15/25  0549 07/11/25  0657 07/10/25  0601   POTASSIUM 4.1 4.1 4.0   < > 4.3   CHLORIDE  --   --  103  --  105   BUN  --   --  10.7  --  11.3    < > = values in this interval not displayed.      Recent Labs   Lab Test 07/10/25  0601 02/22/24  1625 10/07/22  0907   PROTEIN  --   --  30*   BILITOTAL 0.7 0.4  --    AST 10 18  --    ALT 10 27  --        ASSESSMENT  39 year old female with endometrial cancer, diagnosed back in May 2024. Mirena IUD placed and patient was lost to follow up due to lapse in insurance.  Recently seen as outpatient with imaging concerning for disease progression. Now admitted for profound anemia from worsening vaginal bleeding. Bilateral pulmonary nodules idenitifed on chest imaging.   POD 2, s/p RA modified radical hysterectomy, BSO, SLN INJ/BX, mini-laparotomy, bilateral pelvic lymph node dissections. 7/15/2025    Endometrial cancer, clinically stage 3C1:   Concern for progression on 5/30/25 MRI scan.  Repeat MRI 7/11/2025 without significant progression of disease.  Concern for possible left parametrial involvement.  Left ovarian involvement.  s/p RA modified radical hysterectomy, BSO, SLN INJ/BX, mini-laparotomy, bilateral pelvic lymph node dissections. Pathology pending.    Post-operative status  Continue routine postop cares, overall pt doing well. Reviewed procedure and intraoperative findings in detail with patient.  Oncology: Final pathology pending.   Pain: Continue scheduled Tylenol. IBU, Oxycodone and IV Dilaudid available prn. Wean off IV narcotics as able. Abdominal binder, ice packs.   Pulm: Pt weaned off supplemental oxygen. O2 stats stable on RA. Continue IS 10x/hr.   GI: tolerating regular diet, passing flatus  : Urinary retention immediately post-op, now resolved. Pt voiding without difficulty.  FEN:  pt fully tolerating po intake.  DVT prophylaxis: SCDs. Ambulation 4x/daily encouraged. Hgb 7.5 today. Stable.   GI prophylaxis: Daily  Famotidine.   ID:  Afebrile. Monitor.  Heme: EBL: 40cc. Hgb today 7.5, stable.  Asmptomatic. Would recommend PO iron at discharge. Transfused one unit of blood prior to surgery on 7/14.       Pulmonary nodules  Bilateral pulmonary nodules noted on CT chest, largest size 10mm  Status post attempt at percutaneous biopsy without significant tissue return.  Patient declined to do the biopsy again and IR will schedule as an outpatient. Per RN. Was not made NPO and patient refused again this am.       T2DM  Continue blood sugar management per medicine team, appreciate assistance     Hepatitis B  Patient reports history of hepatitis B but admits to improper follow up  Liver labs wnl  Pt avoids NSAIDS    Tobacco use  Encouraged smoking cessation      Dispo: patient appropriate for discharge from a surgical perspective if tolerating PO. Follow up in 2 weeks with Surgery for treatment planning visit. Will need to work on CT biopsy as an outpatient. Patient off the floor currently and will have to come back to see if still hospitalized later today. Discharge per IM. Follow up orders placed.       Paulina Carrillo PA-C, SAM on 7/17/2025 at 8:44 AM   Gynecologic Oncology  [facility]  [facility]: [phone]

## 2025-07-17 NOTE — PROVIDER NOTIFICATION
Dr. Danilo rocha messaged about pt requesting to go home.     Dr. Carrasco paged again via [facility] about pt want to go home tonight.  Pt stating that staff has 10 minutes before she leaves.      Stephanie Gonzalez RN

## 2025-07-22 LAB
PATH REPORT.ADDENDUM SPEC: ABNORMAL
PATH REPORT.COMMENTS IMP SPEC: ABNORMAL
PATH REPORT.COMMENTS IMP SPEC: YES
PATH REPORT.FINAL DX SPEC: ABNORMAL
PATH REPORT.GROSS SPEC: ABNORMAL
PATH REPORT.MICROSCOPIC SPEC OTHER STN: ABNORMAL
PATH REPORT.RELEVANT HX SPEC: ABNORMAL

## 2025-07-22 PROCEDURE — 88342 IMHCHEM/IMCYTCHM 1ST ANTB: CPT | Mod: 26 | Performed by: PATHOLOGY

## 2025-07-22 PROCEDURE — 88112 CYTOPATH CELL ENHANCE TECH: CPT | Mod: 26 | Performed by: PATHOLOGY

## 2025-07-22 PROCEDURE — 88341 IMHCHEM/IMCYTCHM EA ADD ANTB: CPT | Mod: 26 | Performed by: PATHOLOGY

## 2025-07-22 PROCEDURE — 88305 TISSUE EXAM BY PATHOLOGIST: CPT | Mod: 26 | Performed by: PATHOLOGY

## 2025-07-23 LAB
PATH REPORT.COMMENTS IMP SPEC: ABNORMAL
PATH REPORT.COMMENTS IMP SPEC: YES
PATH REPORT.FINAL DX SPEC: ABNORMAL
PATH REPORT.GROSS SPEC: ABNORMAL
PATH REPORT.MICROSCOPIC SPEC OTHER STN: ABNORMAL
PATH REPORT.MICROSCOPIC SPEC OTHER STN: ABNORMAL
PATH REPORT.RELEVANT HX SPEC: ABNORMAL
PATHOLOGY SYNOPTIC REPORT: ABNORMAL
PHOTO IMAGE: ABNORMAL

## 2025-07-24 PROCEDURE — G0452 MOLECULAR PATHOLOGY INTERPR: HCPCS | Mod: 26 | Performed by: PATHOLOGY

## 2025-07-24 PROCEDURE — 81479 UNLISTED MOLECULAR PATHOLOGY: CPT | Performed by: OBSTETRICS & GYNECOLOGY

## 2025-08-03 ENCOUNTER — HEALTH MAINTENANCE LETTER (OUTPATIENT)
Age: 40
End: 2025-08-03

## 2025-08-12 ENCOUNTER — PATIENT OUTREACH (OUTPATIENT)
Dept: CARE COORDINATION | Facility: CLINIC | Age: 40
End: 2025-08-12
Payer: COMMERCIAL

## 2025-09-03 ENCOUNTER — OFFICE VISIT (OUTPATIENT)
Dept: FAMILY MEDICINE | Facility: CLINIC | Age: 40
End: 2025-09-03
Payer: COMMERCIAL

## 2025-09-03 VITALS
HEIGHT: 64 IN | RESPIRATION RATE: 20 BRPM | HEART RATE: 104 BPM | OXYGEN SATURATION: 97 % | BODY MASS INDEX: 43.54 KG/M2 | SYSTOLIC BLOOD PRESSURE: 136 MMHG | TEMPERATURE: 98.7 F | WEIGHT: 255 LBS | DIASTOLIC BLOOD PRESSURE: 91 MMHG

## 2025-09-03 DIAGNOSIS — B18.1 CHRONIC HEPATITIS B (H): ICD-10-CM

## 2025-09-03 DIAGNOSIS — Z12.31 VISIT FOR SCREENING MAMMOGRAM: ICD-10-CM

## 2025-09-03 DIAGNOSIS — N18.1 CKD (CHRONIC KIDNEY DISEASE) STAGE 1, GFR 90 ML/MIN OR GREATER: ICD-10-CM

## 2025-09-03 DIAGNOSIS — F33.2 SEVERE EPISODE OF RECURRENT MAJOR DEPRESSIVE DISORDER, WITHOUT PSYCHOTIC FEATURES (H): ICD-10-CM

## 2025-09-03 DIAGNOSIS — C54.1 ENDOMETRIAL ADENOCARCINOMA (H): ICD-10-CM

## 2025-09-03 DIAGNOSIS — R30.0 DYSURIA: ICD-10-CM

## 2025-09-03 DIAGNOSIS — R80.9 TYPE 2 DIABETES MELLITUS WITH MICROALBUMINURIA, WITHOUT LONG-TERM CURRENT USE OF INSULIN (H): Primary | ICD-10-CM

## 2025-09-03 DIAGNOSIS — E11.29 TYPE 2 DIABETES MELLITUS WITH MICROALBUMINURIA, WITHOUT LONG-TERM CURRENT USE OF INSULIN (H): Primary | ICD-10-CM

## 2025-09-03 DIAGNOSIS — E66.813 CLASS 3 SEVERE OBESITY WITH SERIOUS COMORBIDITY AND BODY MASS INDEX (BMI) OF 40.0 TO 44.9 IN ADULT, UNSPECIFIED OBESITY TYPE (H): ICD-10-CM

## 2025-09-03 DIAGNOSIS — Z13.6 SCREENING FOR CARDIOVASCULAR CONDITION: ICD-10-CM

## 2025-09-03 LAB
ALBUMIN UR-MCNC: NEGATIVE MG/DL
APPEARANCE UR: CLEAR
BACTERIA #/AREA URNS HPF: ABNORMAL /HPF
BILIRUB UR QL STRIP: NEGATIVE
CAOX CRY #/AREA URNS HPF: ABNORMAL /HPF
CLUE CELLS: PRESENT
COLOR UR AUTO: YELLOW
EST. AVERAGE GLUCOSE BLD GHB EST-MCNC: 169 MG/DL
GLUCOSE UR STRIP-MCNC: NEGATIVE MG/DL
HBA1C MFR BLD: 7.5 % (ref 0–5.6)
HGB UR QL STRIP: ABNORMAL
KETONES UR STRIP-MCNC: NEGATIVE MG/DL
LEUKOCYTE ESTERASE UR QL STRIP: ABNORMAL
NITRATE UR QL: NEGATIVE
PH UR STRIP: 6 [PH] (ref 5–8)
RBC #/AREA URNS AUTO: ABNORMAL /HPF
SP GR UR STRIP: 1.02 (ref 1–1.03)
SQUAMOUS #/AREA URNS AUTO: ABNORMAL /LPF
TRANS CELLS #/AREA URNS HPF: ABNORMAL /HPF
TRICHOMONAS, WET PREP: ABNORMAL
UROBILINOGEN UR STRIP-ACNC: 0.2 E.U./DL
WBC #/AREA URNS AUTO: ABNORMAL /HPF
WBC'S/HIGH POWER FIELD, WET PREP: ABNORMAL
YEAST, WET PREP: ABNORMAL

## 2025-09-03 PROCEDURE — 3080F DIAST BP >= 90 MM HG: CPT | Performed by: NURSE PRACTITIONER

## 2025-09-03 PROCEDURE — 90471 IMMUNIZATION ADMIN: CPT | Performed by: NURSE PRACTITIONER

## 2025-09-03 PROCEDURE — 3051F HG A1C>EQUAL 7.0%<8.0%: CPT | Performed by: NURSE PRACTITIONER

## 2025-09-03 PROCEDURE — 83036 HEMOGLOBIN GLYCOSYLATED A1C: CPT | Performed by: NURSE PRACTITIONER

## 2025-09-03 PROCEDURE — 90656 IIV3 VACC NO PRSV 0.5 ML IM: CPT | Performed by: NURSE PRACTITIONER

## 2025-09-03 PROCEDURE — 81001 URINALYSIS AUTO W/SCOPE: CPT | Performed by: NURSE PRACTITIONER

## 2025-09-03 PROCEDURE — 90677 PCV20 VACCINE IM: CPT | Performed by: NURSE PRACTITIONER

## 2025-09-03 PROCEDURE — 87086 URINE CULTURE/COLONY COUNT: CPT | Performed by: NURSE PRACTITIONER

## 2025-09-03 PROCEDURE — 86704 HEP B CORE ANTIBODY TOTAL: CPT | Performed by: NURSE PRACTITIONER

## 2025-09-03 PROCEDURE — 36415 COLL VENOUS BLD VENIPUNCTURE: CPT | Performed by: NURSE PRACTITIONER

## 2025-09-03 PROCEDURE — G2211 COMPLEX E/M VISIT ADD ON: HCPCS | Performed by: NURSE PRACTITIONER

## 2025-09-03 PROCEDURE — 87341 HEP B SURFACE AG NEUTRLZJ IA: CPT | Performed by: NURSE PRACTITIONER

## 2025-09-03 PROCEDURE — 90472 IMMUNIZATION ADMIN EACH ADD: CPT | Performed by: NURSE PRACTITIONER

## 2025-09-03 PROCEDURE — 87210 SMEAR WET MOUNT SALINE/INK: CPT | Performed by: NURSE PRACTITIONER

## 2025-09-03 PROCEDURE — 99214 OFFICE O/P EST MOD 30 MIN: CPT | Mod: 25 | Performed by: NURSE PRACTITIONER

## 2025-09-03 PROCEDURE — 3075F SYST BP GE 130 - 139MM HG: CPT | Performed by: NURSE PRACTITIONER

## 2025-09-03 PROCEDURE — 87340 HEPATITIS B SURFACE AG IA: CPT | Performed by: NURSE PRACTITIONER

## 2025-09-03 PROCEDURE — 80061 LIPID PANEL: CPT | Performed by: NURSE PRACTITIONER

## 2025-09-03 RX ORDER — LISINOPRIL 5 MG/1
5 TABLET ORAL DAILY
Qty: 90 TABLET | Refills: 0 | Status: SHIPPED | OUTPATIENT
Start: 2025-09-03

## 2025-09-03 ASSESSMENT — ASTHMA QUESTIONNAIRES
QUESTION_4 LAST FOUR WEEKS HOW OFTEN HAVE YOU USED YOUR RESCUE INHALER OR NEBULIZER MEDICATION (SUCH AS ALBUTEROL): NOT AT ALL
QUESTION_3 LAST FOUR WEEKS HOW OFTEN DID YOUR ASTHMA SYMPTOMS (WHEEZING, COUGHING, SHORTNESS OF BREATH, CHEST TIGHTNESS OR PAIN) WAKE YOU UP AT NIGHT OR EARLIER THAN USUAL IN THE MORNING: NOT AT ALL
ACT_TOTALSCORE: 24
QUESTION_1 LAST FOUR WEEKS HOW MUCH OF THE TIME DID YOUR ASTHMA KEEP YOU FROM GETTING AS MUCH DONE AT WORK, SCHOOL OR AT HOME: NONE OF THE TIME
QUESTION_2 LAST FOUR WEEKS HOW OFTEN HAVE YOU HAD SHORTNESS OF BREATH: ONCE OR TWICE A WEEK
QUESTION_5 LAST FOUR WEEKS HOW WOULD YOU RATE YOUR ASTHMA CONTROL: COMPLETELY CONTROLLED

## 2025-09-03 ASSESSMENT — PATIENT HEALTH QUESTIONNAIRE - PHQ9
10. IF YOU CHECKED OFF ANY PROBLEMS, HOW DIFFICULT HAVE THESE PROBLEMS MADE IT FOR YOU TO DO YOUR WORK, TAKE CARE OF THINGS AT HOME, OR GET ALONG WITH OTHER PEOPLE: VERY DIFFICULT
SUM OF ALL RESPONSES TO PHQ QUESTIONS 1-9: 13
SUM OF ALL RESPONSES TO PHQ QUESTIONS 1-9: 13

## 2025-09-04 ENCOUNTER — TELEPHONE (OUTPATIENT)
Dept: FAMILY MEDICINE | Facility: CLINIC | Age: 40
End: 2025-09-04
Payer: COMMERCIAL

## 2025-09-04 ENCOUNTER — PATIENT OUTREACH (OUTPATIENT)
Dept: CARE COORDINATION | Facility: CLINIC | Age: 40
End: 2025-09-04
Payer: COMMERCIAL

## 2025-09-04 LAB
BACTERIA UR CULT: NORMAL
CHOLEST SERPL-MCNC: 255 MG/DL
FASTING STATUS PATIENT QL REPORTED: NO
HBV CORE AB SERPL QL IA: REACTIVE
HBV SURFACE AG SERPL QL IA: REACTIVE
HDLC SERPL-MCNC: 32 MG/DL
LDLC SERPL CALC-MCNC: 177 MG/DL
NONHDLC SERPL-MCNC: 223 MG/DL
TRIGL SERPL-MCNC: 232 MG/DL

## (undated) DEVICE — VIAL DECANTER STERILE WHITE DYNJDEC06

## (undated) DEVICE — SUTURE VICRYL+ 0 36IN CT-1 UND VCP946H

## (undated) DEVICE — POUCH TISSUE RETRIEVAL 15MM 6.00" INTRO TRS190SB2

## (undated) DEVICE — SUTURE MONOCRYL 3-0 18 PS2 UND MCP497G

## (undated) DEVICE — PACK TRENGUARD 450 PROCEDURAL 2065406

## (undated) DEVICE — ENDO OBTURATOR ACCESS PORT BLADELESS 8X100MM IAS8-100LP

## (undated) DEVICE — BLADE KNIFE SURG 11 371111

## (undated) DEVICE — SURGICEL ABSORBABLE HEMOSTAT SNOW 4"X4" 2083

## (undated) DEVICE — KIT POSITIONER NUBLUE XL TRND CHST PD BD STRAP TP3000E-S-NB

## (undated) DEVICE — ANTIFOG SOLUTION SEE SHARP 150M TROCAR SWABS 30978 (COI)

## (undated) DEVICE — TUBING FILTER TRI-LUMEN AIRSEAL ASC-EVAC1

## (undated) DEVICE — MAT FLOOR WATERPROOF BACKSHEET FMBP30

## (undated) DEVICE — SOLUTION WATER 1000ML BOTTLE R5000-01

## (undated) DEVICE — SU STRATAFIX PDS 0 CT-2 30CM SXPP1B405

## (undated) DEVICE — SU STRATAFIX PDS PLUS 0 CT-1 30CM SXPP1B450

## (undated) DEVICE — GOWN IMPERVIOUS BREATHABLE SMART LG 89015

## (undated) DEVICE — LUBRICANT INST ELECTROLUBE EL101

## (undated) DEVICE — SUCTION MANIFOLD NEPTUNE 2 SYS 1 PORT 702-025-000

## (undated) DEVICE — CUSTOM PACK DA VINCI GYN SMA5BDVHEA

## (undated) DEVICE — SUCTION STRYKERFLOW II 250-070-500

## (undated) DEVICE — DAVINCI XI SEAL UNIVERSAL 5-12MM 470500

## (undated) DEVICE — SU DERMABOND ADVANCED .7ML DNX12

## (undated) DEVICE — PRTC STD XTRMT TRND ARM HKLP CLSR LF DISP TAP100

## (undated) DEVICE — DRAPE LAP/CHOLE W/O POUCH 89225

## (undated) DEVICE — SYR 10ML FINGER CONTROL W/O NDL 309695

## (undated) DEVICE — DRAPE IOBAN INCISE 23X17" 6650EZ

## (undated) DEVICE — DAVINCI XI DRAPE COLUMN 470341

## (undated) DEVICE — SOLUTION IV 0.9% NACL 1000ML E8000

## (undated) DEVICE — GLOVE UNDER INDICATOR PI SZ 7.0 LF 41670

## (undated) DEVICE — GLOVE BIOGEL PI ULTRATOUCH G SZ 6.5 42165

## (undated) DEVICE — SYR 50ML SLIP TIP W/O NDL 309654

## (undated) DEVICE — SU VICRYL+ 0 27IN CT-1 UND VCP260H

## (undated) DEVICE — GLOVE BIOGEL PI ULTRATOUCH G SZ 7.0 42170

## (undated) DEVICE — SUTURE VICRYL+ 2-0 36IN CT-1 UND VCP945H

## (undated) DEVICE — DAVINCI XI OBTURATOR BLADELESS 8MM 470359

## (undated) DEVICE — DRAPE POUCH INSTRUMENT 3 POCKET 1018L

## (undated) DEVICE — NEEDLE HYPO MAGELLAN SAFETY 22GA 1 1/2IN 8881850215

## (undated) DEVICE — DAVINCI HOT SHEARS TIP COVER  400180

## (undated) DEVICE — PREP CHLORAPREP 26ML TINTED HI-LITE ORANGE 930815

## (undated) DEVICE — PREP DYNA-HEX 4% CHG SCRUB 4OZ BOTTLE MDS098710

## (undated) DEVICE — DAVINCI XI DRAPE ARM 470015

## (undated) DEVICE — ESU GROUND PAD ADULT REM W/15' CORD E7507DB

## (undated) DEVICE — BRIEF STRETCH XL MPS40

## (undated) DEVICE — RETR ELEV / UTERINE MANIPULATOR V-CARE MED CUP 60-6085-201A

## (undated) DEVICE — DRAPE LEGGINGS 8421

## (undated) RX ORDER — WATER 10 ML/10ML
INJECTION INTRAMUSCULAR; INTRAVENOUS; SUBCUTANEOUS
Status: DISPENSED
Start: 2025-07-15

## (undated) RX ORDER — FENTANYL CITRATE 50 UG/ML
INJECTION, SOLUTION INTRAMUSCULAR; INTRAVENOUS
Status: DISPENSED
Start: 2025-07-15

## (undated) RX ORDER — INDOCYANINE GREEN AND WATER 25 MG
KIT INJECTION
Status: DISPENSED
Start: 2025-07-15

## (undated) RX ORDER — FENTANYL CITRATE 50 UG/ML
INJECTION, SOLUTION INTRAMUSCULAR; INTRAVENOUS
Status: DISPENSED
Start: 2025-07-11

## (undated) RX ORDER — BUPIVACAINE HCL/EPINEPHRINE 0.25-.0005
VIAL (ML) INJECTION
Status: DISPENSED
Start: 2025-07-15